# Patient Record
Sex: MALE | Race: BLACK OR AFRICAN AMERICAN | NOT HISPANIC OR LATINO | Employment: OTHER | URBAN - METROPOLITAN AREA
[De-identification: names, ages, dates, MRNs, and addresses within clinical notes are randomized per-mention and may not be internally consistent; named-entity substitution may affect disease eponyms.]

---

## 2018-01-01 ENCOUNTER — TELEPHONE (OUTPATIENT)
Dept: PHARMACY | Facility: CLINIC | Age: 63
End: 2018-01-01

## 2018-01-01 ENCOUNTER — INFUSION (OUTPATIENT)
Dept: INFUSION THERAPY | Facility: HOSPITAL | Age: 63
End: 2018-01-01
Attending: INTERNAL MEDICINE
Payer: MEDICAID

## 2018-01-01 ENCOUNTER — HOSPITAL ENCOUNTER (INPATIENT)
Facility: HOSPITAL | Age: 63
LOS: 2 days | DRG: 871 | End: 2018-10-11
Attending: EMERGENCY MEDICINE | Admitting: INTERNAL MEDICINE
Payer: MEDICAID

## 2018-01-01 ENCOUNTER — TELEPHONE (OUTPATIENT)
Dept: FAMILY MEDICINE | Facility: CLINIC | Age: 63
End: 2018-01-01

## 2018-01-01 ENCOUNTER — HOSPITAL ENCOUNTER (OUTPATIENT)
Facility: HOSPITAL | Age: 63
Discharge: HOME | End: 2018-10-04
Attending: EMERGENCY MEDICINE | Admitting: INTERNAL MEDICINE
Payer: MEDICAID

## 2018-01-01 ENCOUNTER — ANESTHESIA EVENT (OUTPATIENT)
Dept: INTENSIVE CARE | Facility: HOSPITAL | Age: 63
DRG: 871 | End: 2018-01-01
Payer: MEDICAID

## 2018-01-01 ENCOUNTER — HOSPITAL ENCOUNTER (INPATIENT)
Facility: HOSPITAL | Age: 63
LOS: 1 days | Discharge: HOME | DRG: 189 | End: 2018-05-15
Attending: HOSPITALIST | Admitting: HOSPITALIST
Payer: MEDICAID

## 2018-01-01 ENCOUNTER — HOSPITAL ENCOUNTER (EMERGENCY)
Facility: HOSPITAL | Age: 63
Discharge: HOME | End: 2018-05-11
Payer: MEDICAID

## 2018-01-01 ENCOUNTER — OFFICE VISIT (OUTPATIENT)
Dept: CARDIOLOGY | Facility: CLINIC | Age: 63
DRG: 871 | End: 2018-01-01
Payer: MEDICAID

## 2018-01-01 ENCOUNTER — OFFICE VISIT (OUTPATIENT)
Dept: FAMILY MEDICINE | Facility: CLINIC | Age: 63
End: 2018-01-01
Payer: MEDICAID

## 2018-01-01 ENCOUNTER — TELEPHONE (OUTPATIENT)
Dept: HEMATOLOGY/ONCOLOGY | Facility: CLINIC | Age: 63
End: 2018-01-01

## 2018-01-01 ENCOUNTER — INFUSION (OUTPATIENT)
Dept: INFUSION THERAPY | Facility: HOSPITAL | Age: 63
DRG: 871 | End: 2018-01-01
Attending: INTERNAL MEDICINE
Payer: MEDICAID

## 2018-01-01 ENCOUNTER — OFFICE VISIT (OUTPATIENT)
Dept: HEMATOLOGY/ONCOLOGY | Facility: CLINIC | Age: 63
End: 2018-01-01
Payer: MEDICAID

## 2018-01-01 ENCOUNTER — HOSPITAL ENCOUNTER (INPATIENT)
Facility: HOSPITAL | Age: 63
LOS: 7 days | Discharge: HOME HEALTH | DRG: 834 | End: 2018-09-20
Attending: EMERGENCY MEDICINE | Admitting: STUDENT IN AN ORGANIZED HEALTH CARE EDUCATION/TRAINING PROGRAM
Payer: MEDICAID

## 2018-01-01 ENCOUNTER — HOSPITAL ENCOUNTER (EMERGENCY)
Facility: HOSPITAL | Age: 63
Discharge: HOME | End: 2018-09-30
Attending: EMERGENCY MEDICINE
Payer: MEDICAID

## 2018-01-01 ENCOUNTER — ANESTHESIA (OUTPATIENT)
Dept: INTENSIVE CARE | Facility: HOSPITAL | Age: 63
DRG: 871 | End: 2018-01-01
Payer: MEDICAID

## 2018-01-01 VITALS
WEIGHT: 176.56 LBS | DIASTOLIC BLOOD PRESSURE: 84 MMHG | HEART RATE: 74 BPM | SYSTOLIC BLOOD PRESSURE: 138 MMHG | HEIGHT: 71 IN | BODY MASS INDEX: 24.72 KG/M2 | TEMPERATURE: 100 F | RESPIRATION RATE: 20 BRPM | OXYGEN SATURATION: 98 %

## 2018-01-01 VITALS
HEIGHT: 71 IN | DIASTOLIC BLOOD PRESSURE: 63 MMHG | RESPIRATION RATE: 20 BRPM | OXYGEN SATURATION: 84 % | TEMPERATURE: 99 F | HEART RATE: 83 BPM | SYSTOLIC BLOOD PRESSURE: 122 MMHG | BODY MASS INDEX: 27.84 KG/M2 | WEIGHT: 198.88 LBS

## 2018-01-01 VITALS
OXYGEN SATURATION: 94 % | SYSTOLIC BLOOD PRESSURE: 138 MMHG | TEMPERATURE: 98 F | WEIGHT: 182.63 LBS | HEIGHT: 71 IN | BODY MASS INDEX: 25.57 KG/M2 | DIASTOLIC BLOOD PRESSURE: 88 MMHG | HEART RATE: 61 BPM | RESPIRATION RATE: 18 BRPM

## 2018-01-01 VITALS
DIASTOLIC BLOOD PRESSURE: 72 MMHG | HEART RATE: 68 BPM | HEART RATE: 69 BPM | WEIGHT: 181 LBS | DIASTOLIC BLOOD PRESSURE: 52 MMHG | TEMPERATURE: 98 F | HEIGHT: 71 IN | BODY MASS INDEX: 25.34 KG/M2 | SYSTOLIC BLOOD PRESSURE: 106 MMHG | OXYGEN SATURATION: 97 % | RESPIRATION RATE: 18 BRPM | SYSTOLIC BLOOD PRESSURE: 76 MMHG | OXYGEN SATURATION: 95 % | BODY MASS INDEX: 24.47 KG/M2 | WEIGHT: 174.81 LBS | TEMPERATURE: 98 F | HEIGHT: 71 IN

## 2018-01-01 VITALS
WEIGHT: 200 LBS | RESPIRATION RATE: 22 BRPM | SYSTOLIC BLOOD PRESSURE: 102 MMHG | OXYGEN SATURATION: 98 % | TEMPERATURE: 100 F | DIASTOLIC BLOOD PRESSURE: 57 MMHG | HEART RATE: 75 BPM

## 2018-01-01 VITALS
SYSTOLIC BLOOD PRESSURE: 90 MMHG | WEIGHT: 182.75 LBS | TEMPERATURE: 100 F | DIASTOLIC BLOOD PRESSURE: 68 MMHG | HEIGHT: 71 IN | BODY MASS INDEX: 28.98 KG/M2 | WEIGHT: 207 LBS | HEART RATE: 73 BPM | BODY MASS INDEX: 25.58 KG/M2 | SYSTOLIC BLOOD PRESSURE: 102 MMHG | HEART RATE: 75 BPM | HEIGHT: 71 IN | TEMPERATURE: 99 F | RESPIRATION RATE: 18 BRPM | OXYGEN SATURATION: 95 % | OXYGEN SATURATION: 94 % | DIASTOLIC BLOOD PRESSURE: 63 MMHG

## 2018-01-01 VITALS
SYSTOLIC BLOOD PRESSURE: 101 MMHG | HEART RATE: 73 BPM | RESPIRATION RATE: 16 BRPM | DIASTOLIC BLOOD PRESSURE: 58 MMHG | TEMPERATURE: 98 F

## 2018-01-01 VITALS
HEART RATE: 72 BPM | DIASTOLIC BLOOD PRESSURE: 81 MMHG | SYSTOLIC BLOOD PRESSURE: 132 MMHG | HEIGHT: 71 IN | RESPIRATION RATE: 16 BRPM | TEMPERATURE: 98 F | BODY MASS INDEX: 28 KG/M2 | OXYGEN SATURATION: 95 % | WEIGHT: 200 LBS

## 2018-01-01 VITALS
HEART RATE: 70 BPM | TEMPERATURE: 98 F | SYSTOLIC BLOOD PRESSURE: 90 MMHG | DIASTOLIC BLOOD PRESSURE: 56 MMHG | RESPIRATION RATE: 18 BRPM

## 2018-01-01 VITALS
HEIGHT: 71 IN | WEIGHT: 182.75 LBS | RESPIRATION RATE: 18 BRPM | TEMPERATURE: 100 F | BODY MASS INDEX: 25.58 KG/M2 | DIASTOLIC BLOOD PRESSURE: 59 MMHG | SYSTOLIC BLOOD PRESSURE: 103 MMHG | HEART RATE: 68 BPM

## 2018-01-01 VITALS
BODY MASS INDEX: 25.62 KG/M2 | RESPIRATION RATE: 19 BRPM | HEART RATE: 94 BPM | SYSTOLIC BLOOD PRESSURE: 109 MMHG | OXYGEN SATURATION: 95 % | WEIGHT: 183 LBS | TEMPERATURE: 98 F | HEIGHT: 71 IN | DIASTOLIC BLOOD PRESSURE: 69 MMHG

## 2018-01-01 VITALS
DIASTOLIC BLOOD PRESSURE: 86 MMHG | SYSTOLIC BLOOD PRESSURE: 133 MMHG | RESPIRATION RATE: 18 BRPM | HEART RATE: 70 BPM | TEMPERATURE: 99 F

## 2018-01-01 DIAGNOSIS — N52.9 ERECTILE DYSFUNCTION, UNSPECIFIED ERECTILE DYSFUNCTION TYPE: ICD-10-CM

## 2018-01-01 DIAGNOSIS — I10 ESSENTIAL HYPERTENSION: ICD-10-CM

## 2018-01-01 DIAGNOSIS — Z86.73 HISTORY OF CVA (CEREBROVASCULAR ACCIDENT): ICD-10-CM

## 2018-01-01 DIAGNOSIS — D69.6 THROMBOCYTOPENIA: ICD-10-CM

## 2018-01-01 DIAGNOSIS — C95.00 ACUTE LEUKEMIA NOT HAVING ACHIEVED REMISSION: Primary | ICD-10-CM

## 2018-01-01 DIAGNOSIS — D61.818 PANCYTOPENIA: ICD-10-CM

## 2018-01-01 DIAGNOSIS — Z00.00 ANNUAL PHYSICAL EXAM: Primary | ICD-10-CM

## 2018-01-01 DIAGNOSIS — L08.9 LOCAL SKIN INFECTION: ICD-10-CM

## 2018-01-01 DIAGNOSIS — C92.00 ACUTE MYELOID LEUKEMIA NOT HAVING ACHIEVED REMISSION: ICD-10-CM

## 2018-01-01 DIAGNOSIS — C95.00 ACUTE LEUKEMIA: ICD-10-CM

## 2018-01-01 DIAGNOSIS — Z85.46 HISTORY OF PROSTATE CANCER: ICD-10-CM

## 2018-01-01 DIAGNOSIS — K08.9 POOR DENTITION: ICD-10-CM

## 2018-01-01 DIAGNOSIS — C95.00 ACUTE LEUKEMIA NOT HAVING ACHIEVED REMISSION: ICD-10-CM

## 2018-01-01 DIAGNOSIS — J06.9 ACUTE URI: ICD-10-CM

## 2018-01-01 DIAGNOSIS — A41.9 SEPTIC SHOCK: Primary | ICD-10-CM

## 2018-01-01 DIAGNOSIS — J44.9 CHRONIC OBSTRUCTIVE PULMONARY DISEASE, UNSPECIFIED COPD TYPE: ICD-10-CM

## 2018-01-01 DIAGNOSIS — I25.10 CORONARY ARTERY DISEASE INVOLVING NATIVE CORONARY ARTERY OF NATIVE HEART WITHOUT ANGINA PECTORIS: ICD-10-CM

## 2018-01-01 DIAGNOSIS — J44.1 COPD EXACERBATION: ICD-10-CM

## 2018-01-01 DIAGNOSIS — E87.6 HYPOKALEMIA: ICD-10-CM

## 2018-01-01 DIAGNOSIS — E87.5 HYPERKALEMIA: ICD-10-CM

## 2018-01-01 DIAGNOSIS — J96.01 ACUTE HYPOXEMIC RESPIRATORY FAILURE: Primary | ICD-10-CM

## 2018-01-01 DIAGNOSIS — I95.9 HYPOTENSION: ICD-10-CM

## 2018-01-01 DIAGNOSIS — D50.0 ANEMIA DUE TO CHRONIC BLOOD LOSS: ICD-10-CM

## 2018-01-01 DIAGNOSIS — N17.9 AKI (ACUTE KIDNEY INJURY): ICD-10-CM

## 2018-01-01 DIAGNOSIS — D50.0 ANEMIA DUE TO CHRONIC BLOOD LOSS: Primary | ICD-10-CM

## 2018-01-01 DIAGNOSIS — E78.5 HYPERLIPIDEMIA, UNSPECIFIED HYPERLIPIDEMIA TYPE: ICD-10-CM

## 2018-01-01 DIAGNOSIS — I50.9 CONGESTIVE HEART FAILURE, UNSPECIFIED HF CHRONICITY, UNSPECIFIED HEART FAILURE TYPE: ICD-10-CM

## 2018-01-01 DIAGNOSIS — D64.9 NORMOCYTIC ANEMIA: ICD-10-CM

## 2018-01-01 DIAGNOSIS — D72.819 LEUKOPENIA, UNSPECIFIED TYPE: ICD-10-CM

## 2018-01-01 DIAGNOSIS — Z45.2 ENCOUNTER FOR CENTRAL LINE PLACEMENT: ICD-10-CM

## 2018-01-01 DIAGNOSIS — I46.9 CARDIAC ARREST: ICD-10-CM

## 2018-01-01 DIAGNOSIS — C92.00 ACUTE MYELOID LEUKEMIA NOT HAVING ACHIEVED REMISSION: Primary | ICD-10-CM

## 2018-01-01 DIAGNOSIS — J44.1 COPD WITH ACUTE EXACERBATION: ICD-10-CM

## 2018-01-01 DIAGNOSIS — F17.200 TOBACCO DEPENDENCE: ICD-10-CM

## 2018-01-01 DIAGNOSIS — R53.1 WEAKNESS: ICD-10-CM

## 2018-01-01 DIAGNOSIS — R06.02 SOB (SHORTNESS OF BREATH): ICD-10-CM

## 2018-01-01 DIAGNOSIS — R76.8 HEPATITIS B CORE ANTIBODY POSITIVE: ICD-10-CM

## 2018-01-01 DIAGNOSIS — G93.41 ACUTE METABOLIC ENCEPHALOPATHY: ICD-10-CM

## 2018-01-01 DIAGNOSIS — T78.3XXA ANGIOEDEMA, INITIAL ENCOUNTER: Primary | ICD-10-CM

## 2018-01-01 DIAGNOSIS — E04.1 SOLITARY NODULE OF RIGHT LOBE OF THYROID: ICD-10-CM

## 2018-01-01 DIAGNOSIS — J18.9 PNEUMONIA OF RIGHT LOWER LOBE DUE TO INFECTIOUS ORGANISM: Primary | ICD-10-CM

## 2018-01-01 DIAGNOSIS — R50.9 FEVER: ICD-10-CM

## 2018-01-01 DIAGNOSIS — F17.213 CIGARETTE NICOTINE DEPENDENCE WITH WITHDRAWAL: ICD-10-CM

## 2018-01-01 DIAGNOSIS — K04.7 DENTAL INFECTION: Primary | ICD-10-CM

## 2018-01-01 DIAGNOSIS — Z86.73 HISTORY OF CVA (CEREBROVASCULAR ACCIDENT): Primary | ICD-10-CM

## 2018-01-01 DIAGNOSIS — R50.9 ACUTE FEBRILE ILLNESS: Primary | ICD-10-CM

## 2018-01-01 DIAGNOSIS — R32 URINARY INCONTINENCE, UNSPECIFIED TYPE: ICD-10-CM

## 2018-01-01 DIAGNOSIS — N28.9 RENAL INSUFFICIENCY: ICD-10-CM

## 2018-01-01 DIAGNOSIS — D69.6 THROMBOCYTOPENIA: Primary | ICD-10-CM

## 2018-01-01 DIAGNOSIS — R65.21 SEPTIC SHOCK: Primary | ICD-10-CM

## 2018-01-01 DIAGNOSIS — R76.8 HEPATITIS B CORE ANTIBODY POSITIVE: Primary | ICD-10-CM

## 2018-01-01 DIAGNOSIS — R57.9 SHOCK: ICD-10-CM

## 2018-01-01 DIAGNOSIS — D62 ACUTE BLOOD LOSS ANEMIA: ICD-10-CM

## 2018-01-01 DIAGNOSIS — E78.49 OTHER HYPERLIPIDEMIA: ICD-10-CM

## 2018-01-01 DIAGNOSIS — D61.818 OTHER PANCYTOPENIA: ICD-10-CM

## 2018-01-01 LAB
ABO + RH BLD: NORMAL
AFP SERPL-MCNC: 2 NG/ML
ALBUMIN SERPL BCP-MCNC: 1.9 G/DL
ALBUMIN SERPL BCP-MCNC: 2 G/DL
ALBUMIN SERPL BCP-MCNC: 2 G/DL
ALBUMIN SERPL BCP-MCNC: 2.1 G/DL
ALBUMIN SERPL BCP-MCNC: 2.2 G/DL
ALBUMIN SERPL BCP-MCNC: 2.3 G/DL
ALBUMIN SERPL BCP-MCNC: 2.6 G/DL
ALBUMIN SERPL BCP-MCNC: 2.6 G/DL
ALBUMIN SERPL BCP-MCNC: 2.7 G/DL
ALBUMIN SERPL BCP-MCNC: 2.8 G/DL
ALBUMIN SERPL BCP-MCNC: 2.9 G/DL
ALBUMIN SERPL BCP-MCNC: 3.1 G/DL
ALBUMIN SERPL BCP-MCNC: 3.2 G/DL
ALBUMIN SERPL BCP-MCNC: 3.3 G/DL
ALBUMIN SERPL BCP-MCNC: 3.4 G/DL
ALBUMIN SERPL BCP-MCNC: 3.8 G/DL
ALL (BM) DISCLAIMER: NORMAL
ALL (BM) RELEASED BY: NORMAL
ALL (BM) RESULT SUMMARY: NORMAL
ALL (BM) RESULT TABLE: NORMAL
ALLENS TEST: ABNORMAL
ALP SERPL-CCNC: 110 U/L
ALP SERPL-CCNC: 64 U/L
ALP SERPL-CCNC: 64 U/L
ALP SERPL-CCNC: 71 U/L
ALP SERPL-CCNC: 72 U/L
ALP SERPL-CCNC: 73 U/L
ALP SERPL-CCNC: 74 U/L
ALP SERPL-CCNC: 77 U/L
ALP SERPL-CCNC: 79 U/L
ALP SERPL-CCNC: 80 U/L
ALP SERPL-CCNC: 82 U/L
ALP SERPL-CCNC: 89 U/L
ALP SERPL-CCNC: 90 U/L
ALP SERPL-CCNC: 91 U/L
ALP SERPL-CCNC: 91 U/L
ALP SERPL-CCNC: 94 U/L
ALP SERPL-CCNC: 94 U/L
ALP SERPL-CCNC: 95 U/L
ALT SERPL W/O P-5'-P-CCNC: 105 U/L
ALT SERPL W/O P-5'-P-CCNC: 1133 U/L
ALT SERPL W/O P-5'-P-CCNC: 150 U/L
ALT SERPL W/O P-5'-P-CCNC: 2148 U/L
ALT SERPL W/O P-5'-P-CCNC: 240 U/L
ALT SERPL W/O P-5'-P-CCNC: 30 U/L
ALT SERPL W/O P-5'-P-CCNC: 40 U/L
ALT SERPL W/O P-5'-P-CCNC: 52 U/L
ALT SERPL W/O P-5'-P-CCNC: 53 U/L
ALT SERPL W/O P-5'-P-CCNC: 53 U/L
ALT SERPL W/O P-5'-P-CCNC: 58 U/L
ALT SERPL W/O P-5'-P-CCNC: 60 U/L
ALT SERPL W/O P-5'-P-CCNC: 60 U/L
ALT SERPL W/O P-5'-P-CCNC: 64 U/L
ALT SERPL W/O P-5'-P-CCNC: 67 U/L
ALT SERPL W/O P-5'-P-CCNC: 674 U/L
ALT SERPL W/O P-5'-P-CCNC: 70 U/L
ALT SERPL W/O P-5'-P-CCNC: 74 U/L
ALT SERPL W/O P-5'-P-CCNC: 76 U/L
ALT SERPL W/O P-5'-P-CCNC: 86 U/L
ALT SERPL W/O P-5'-P-CCNC: 86 U/L
ALT SERPL W/O P-5'-P-CCNC: 98 U/L
AML FISH ADDITIONAL INFORMATION (BM): NORMAL
AML FISH DISCLAIMER (BM): NORMAL
AML FISH REASON FOR REFERRAL (BM): NORMAL
AML FISH RELEASED BY (BM): NORMAL
AML FISH RESULT (BM): NORMAL
AML FISH RESULT SUMMARY (BM): NORMAL
AML FISH RESULT TABLE (BM): NORMAL
AMPHET+METHAMPHET UR QL: NEGATIVE
ANA SER QL IF: NORMAL
ANION GAP SERPL CALC-SCNC: 10 MMOL/L
ANION GAP SERPL CALC-SCNC: 10 MMOL/L
ANION GAP SERPL CALC-SCNC: 11 MMOL/L
ANION GAP SERPL CALC-SCNC: 11 MMOL/L
ANION GAP SERPL CALC-SCNC: 12 MMOL/L
ANION GAP SERPL CALC-SCNC: 13 MMOL/L
ANION GAP SERPL CALC-SCNC: 13 MMOL/L
ANION GAP SERPL CALC-SCNC: 14 MMOL/L
ANION GAP SERPL CALC-SCNC: 15 MMOL/L
ANION GAP SERPL CALC-SCNC: 16 MMOL/L
ANION GAP SERPL CALC-SCNC: 17 MMOL/L
ANION GAP SERPL CALC-SCNC: 18 MMOL/L
ANION GAP SERPL CALC-SCNC: 5 MMOL/L
ANION GAP SERPL CALC-SCNC: 6 MMOL/L
ANION GAP SERPL CALC-SCNC: 6 MMOL/L
ANION GAP SERPL CALC-SCNC: 7 MMOL/L
ANION GAP SERPL CALC-SCNC: 8 MMOL/L
ANION GAP SERPL CALC-SCNC: 9 MMOL/L
ANION GAP SERPL CALC-SCNC: 9 MMOL/L
ANISOCYTOSIS BLD QL SMEAR: ABNORMAL
ANISOCYTOSIS BLD QL SMEAR: SLIGHT
ANNOTATION COMMENT IMP: NORMAL
APTT BLDCRRT: 24.1 SEC
APTT BLDCRRT: 25 SEC
APTT BLDCRRT: 26.3 SEC
AST SERPL-CCNC: 106 U/L
AST SERPL-CCNC: 1071 U/L
AST SERPL-CCNC: 174 U/L
AST SERPL-CCNC: 1988 U/L
AST SERPL-CCNC: 20 U/L
AST SERPL-CCNC: 276 U/L
AST SERPL-CCNC: 30 U/L
AST SERPL-CCNC: 31 U/L
AST SERPL-CCNC: 31 U/L
AST SERPL-CCNC: 32 U/L
AST SERPL-CCNC: 36 U/L
AST SERPL-CCNC: 3835 U/L
AST SERPL-CCNC: 39 U/L
AST SERPL-CCNC: 41 U/L
AST SERPL-CCNC: 45 U/L
AST SERPL-CCNC: 50 U/L
AST SERPL-CCNC: 51 U/L
AST SERPL-CCNC: 55 U/L
AST SERPL-CCNC: 69 U/L
AST SERPL-CCNC: 70 U/L
AST SERPL-CCNC: 70 U/L
AST SERPL-CCNC: 95 U/L
BACTERIA #/AREA URNS AUTO: ABNORMAL /HPF
BACTERIA #/AREA URNS AUTO: NORMAL /HPF
BACTERIA BLD CULT: NORMAL
BACTERIA SPEC AEROBE CULT: NORMAL
BACTERIA THROAT CULT: NORMAL
BACTERIA THROAT CULT: NORMAL
BACTERIA UR CULT: NO GROWTH
BARBITURATES UR QL SCN>200 NG/ML: NEGATIVE
BASOPHILS # BLD AUTO: 0 K/UL
BASOPHILS # BLD AUTO: 0.01 K/UL
BASOPHILS # BLD AUTO: 0.01 K/UL
BASOPHILS # BLD AUTO: 0.04 K/UL
BASOPHILS # BLD AUTO: ABNORMAL K/UL
BASOPHILS NFR BLD: 0 %
BASOPHILS NFR BLD: 0.4 %
BASOPHILS NFR BLD: 0.5 %
BASOPHILS NFR BLD: 1.1 %
BENZODIAZ UR QL SCN>200 NG/ML: NEGATIVE
BILIRUB SERPL-MCNC: 0.4 MG/DL
BILIRUB SERPL-MCNC: 0.5 MG/DL
BILIRUB SERPL-MCNC: 0.5 MG/DL
BILIRUB SERPL-MCNC: 0.6 MG/DL
BILIRUB SERPL-MCNC: 0.9 MG/DL
BILIRUB SERPL-MCNC: 0.9 MG/DL
BILIRUB SERPL-MCNC: 1 MG/DL
BILIRUB SERPL-MCNC: 1.2 MG/DL
BILIRUB SERPL-MCNC: 1.4 MG/DL
BILIRUB SERPL-MCNC: 2 MG/DL
BILIRUB SERPL-MCNC: 2.5 MG/DL
BILIRUB SERPL-MCNC: 2.7 MG/DL
BILIRUB SERPL-MCNC: 2.8 MG/DL
BILIRUB SERPL-MCNC: 3 MG/DL
BILIRUB SERPL-MCNC: 3 MG/DL
BILIRUB UR QL STRIP: NEGATIVE
BILIRUB UR QL STRIP: NEGATIVE
BLASTS NFR BLD MANUAL: 59 %
BLASTS NFR BLD MANUAL: 61 %
BLASTS NFR BLD MANUAL: 63 %
BLASTS NFR BLD MANUAL: 67 %
BLASTS NFR BLD MANUAL: 72 %
BLASTS NFR BLD MANUAL: 72 %
BLASTS NFR BLD MANUAL: 74 %
BLASTS NFR BLD MANUAL: 76 %
BLASTS NFR BLD MANUAL: 77 %
BLASTS NFR BLD MANUAL: 77 %
BLASTS NFR BLD MANUAL: 78 %
BLASTS NFR BLD MANUAL: 81 %
BLASTS NFR BLD MANUAL: 82 %
BLASTS NFR BLD MANUAL: 84 %
BLASTS NFR BLD MANUAL: 85 %
BLASTS NFR BLD MANUAL: 88 %
BLD GP AB SCN CELLS X3 SERPL QL: NORMAL
BLD PROD TYP BPU: NORMAL
BLOOD UNIT EXPIRATION DATE: NORMAL
BLOOD UNIT TYPE CODE: 5100
BLOOD UNIT TYPE CODE: 6200
BLOOD UNIT TYPE CODE: 6200
BLOOD UNIT TYPE: NORMAL
BODY SITE - BONE MARROW: NORMAL
BONE MARROW IRON STAIN COMMENT: NORMAL
BONE MARROW WRIGHT STAIN COMMENT: NORMAL
BUN SERPL-MCNC: 10 MG/DL
BUN SERPL-MCNC: 10 MG/DL
BUN SERPL-MCNC: 11 MG/DL
BUN SERPL-MCNC: 12 MG/DL
BUN SERPL-MCNC: 13 MG/DL
BUN SERPL-MCNC: 14 MG/DL
BUN SERPL-MCNC: 15 MG/DL
BUN SERPL-MCNC: 17 MG/DL
BUN SERPL-MCNC: 17 MG/DL
BUN SERPL-MCNC: 18 MG/DL
BUN SERPL-MCNC: 22 MG/DL
BUN SERPL-MCNC: 26 MG/DL
BUN SERPL-MCNC: 28 MG/DL
BUN SERPL-MCNC: 29 MG/DL
BUN SERPL-MCNC: 39 MG/DL (ref 6–30)
BUN SERPL-MCNC: 45 MG/DL
BUN SERPL-MCNC: 47 MG/DL
BUN SERPL-MCNC: 53 MG/DL
BUN SERPL-MCNC: 56 MG/DL
BUN SERPL-MCNC: 65 MG/DL
BUN SERPL-MCNC: 70 MG/DL
BUN SERPL-MCNC: 70 MG/DL
BUN SERPL-MCNC: 80 MG/DL
BUN SERPL-MCNC: 84 MG/DL
BUN SERPL-MCNC: 86 MG/DL
BUN SERPL-MCNC: 9 MG/DL
BURR CELLS BLD QL SMEAR: ABNORMAL
BZE UR QL SCN: NEGATIVE
CA-I BLDV-SCNC: 0.68 MMOL/L
CALCIUM SERPL-MCNC: 10.1 MG/DL
CALCIUM SERPL-MCNC: 5.7 MG/DL
CALCIUM SERPL-MCNC: 5.7 MG/DL
CALCIUM SERPL-MCNC: 6 MG/DL
CALCIUM SERPL-MCNC: 6.3 MG/DL
CALCIUM SERPL-MCNC: 6.4 MG/DL
CALCIUM SERPL-MCNC: 6.8 MG/DL
CALCIUM SERPL-MCNC: 6.8 MG/DL
CALCIUM SERPL-MCNC: 6.9 MG/DL
CALCIUM SERPL-MCNC: 7.1 MG/DL
CALCIUM SERPL-MCNC: 8 MG/DL
CALCIUM SERPL-MCNC: 8.2 MG/DL
CALCIUM SERPL-MCNC: 8.3 MG/DL
CALCIUM SERPL-MCNC: 8.6 MG/DL
CALCIUM SERPL-MCNC: 8.7 MG/DL
CALCIUM SERPL-MCNC: 9 MG/DL
CALCIUM SERPL-MCNC: 9.1 MG/DL
CALCIUM SERPL-MCNC: 9.2 MG/DL
CALCIUM SERPL-MCNC: 9.2 MG/DL
CALCIUM SERPL-MCNC: 9.3 MG/DL
CALCIUM SERPL-MCNC: 9.3 MG/DL
CALCIUM SERPL-MCNC: 9.5 MG/DL
CALCIUM SERPL-MCNC: 9.5 MG/DL
CALCIUM SERPL-MCNC: 9.6 MG/DL
CANNABINOIDS UR QL SCN: NEGATIVE
CHLORIDE SERPL-SCNC: 100 MMOL/L
CHLORIDE SERPL-SCNC: 101 MMOL/L
CHLORIDE SERPL-SCNC: 103 MMOL/L
CHLORIDE SERPL-SCNC: 104 MMOL/L
CHLORIDE SERPL-SCNC: 104 MMOL/L
CHLORIDE SERPL-SCNC: 105 MMOL/L
CHLORIDE SERPL-SCNC: 105 MMOL/L
CHLORIDE SERPL-SCNC: 106 MMOL/L
CHLORIDE SERPL-SCNC: 106 MMOL/L
CHLORIDE SERPL-SCNC: 109 MMOL/L
CHLORIDE SERPL-SCNC: 87 MMOL/L
CHLORIDE SERPL-SCNC: 88 MMOL/L
CHLORIDE SERPL-SCNC: 91 MMOL/L
CHLORIDE SERPL-SCNC: 91 MMOL/L (ref 95–110)
CHLORIDE SERPL-SCNC: 92 MMOL/L
CHLORIDE SERPL-SCNC: 93 MMOL/L
CHLORIDE SERPL-SCNC: 93 MMOL/L
CHLORIDE SERPL-SCNC: 94 MMOL/L
CHLORIDE SERPL-SCNC: 95 MMOL/L
CHLORIDE SERPL-SCNC: 95 MMOL/L
CHLORIDE SERPL-SCNC: 97 MMOL/L
CHLORIDE SERPL-SCNC: 98 MMOL/L
CHLORIDE SERPL-SCNC: 99 MMOL/L
CHROM BANDING METHOD: NORMAL
CHROMOSOME ANALYSIS BM ADDITIONAL INFORMATION: NORMAL
CHROMOSOME ANALYSIS BM RELEASED BY: NORMAL
CHROMOSOME ANALYSIS BM RESULT SUMMARY: NORMAL
CK SERPL-CCNC: 226 U/L
CLARITY UR REFRACT.AUTO: ABNORMAL
CLARITY UR REFRACT.AUTO: ABNORMAL
CLINICAL CYTOGENETICIST REVIEW: NORMAL
CLINICAL CYTOGENETICIST REVIEW: NORMAL
CLINICAL DIAGNOSIS - BONE MARROW: NORMAL
CMV IGG SERPL QL IA: REACTIVE
CMV IGM TITR SERPL: <8 U/ML
CO2 SERPL-SCNC: 13 MMOL/L
CO2 SERPL-SCNC: 15 MMOL/L
CO2 SERPL-SCNC: 16 MMOL/L
CO2 SERPL-SCNC: 17 MMOL/L
CO2 SERPL-SCNC: 18 MMOL/L
CO2 SERPL-SCNC: 19 MMOL/L
CO2 SERPL-SCNC: 20 MMOL/L
CO2 SERPL-SCNC: 21 MMOL/L
CO2 SERPL-SCNC: 24 MMOL/L
CO2 SERPL-SCNC: 24 MMOL/L
CO2 SERPL-SCNC: 25 MMOL/L
CO2 SERPL-SCNC: 25 MMOL/L
CO2 SERPL-SCNC: 26 MMOL/L
CO2 SERPL-SCNC: 27 MMOL/L
CO2 SERPL-SCNC: 27 MMOL/L
CO2 SERPL-SCNC: 28 MMOL/L
CO2 SERPL-SCNC: 29 MMOL/L
CO2 SERPL-SCNC: 30 MMOL/L
CO2 SERPL-SCNC: 30 MMOL/L
CO2 SERPL-SCNC: 34 MMOL/L
CO2 SERPL-SCNC: 35 MMOL/L
CODING SYSTEM: NORMAL
COLOR UR AUTO: ABNORMAL
COLOR UR AUTO: YELLOW
CREAT SERPL-MCNC: 0.8 MG/DL
CREAT SERPL-MCNC: 0.9 MG/DL
CREAT SERPL-MCNC: 1 MG/DL
CREAT SERPL-MCNC: 1 MG/DL
CREAT SERPL-MCNC: 1.2 MG/DL
CREAT SERPL-MCNC: 1.5 MG/DL
CREAT SERPL-MCNC: 1.9 MG/DL
CREAT SERPL-MCNC: 2.3 MG/DL
CREAT SERPL-MCNC: 2.4 MG/DL
CREAT SERPL-MCNC: 2.46 MG/DL
CREAT SERPL-MCNC: 2.6 MG/DL
CREAT SERPL-MCNC: 2.6 MG/DL
CREAT SERPL-MCNC: 2.7 MG/DL (ref 0.5–1.4)
CREAT SERPL-MCNC: 2.8 MG/DL
CREAT SERPL-MCNC: 3.4 MG/DL
CREAT SERPL-MCNC: 3.8 MG/DL
CREAT SERPL-MCNC: 3.8 MG/DL
CREAT SERPL-MCNC: 4.4 MG/DL
CREAT SERPL-MCNC: 4.6 MG/DL
CREAT SERPL-MCNC: 4.8 MG/DL
CREAT UR-MCNC: 148 MG/DL
DACRYOCYTES BLD QL SMEAR: ABNORMAL
DACRYOCYTES BLD QL SMEAR: ABNORMAL
DAT IGG-SP REAG RBC-IMP: NORMAL
DELSYS: ABNORMAL
DEPRECATED S PYO AG THROAT QL EIA: NEGATIVE
DEPRECATED S PYO AG THROAT QL EIA: NEGATIVE
DIFFERENTIAL METHOD: ABNORMAL
DISPENSE STATUS: NORMAL
DNA/RNA EXTRACT AND HOLD RESULT: NORMAL
DNA/RNA EXTRACTION: NORMAL
EBV EA AB TITR SER: 101 U/ML
EBV NA IGG SER QL: 483 U/ML
EBV VCA IGG SER QL: >750 U/ML
EBV VCA IGM SER-ACNC: <10 U/ML
EOSINOPHIL # BLD AUTO: 0 K/UL
EOSINOPHIL # BLD AUTO: 0.1 K/UL
EOSINOPHIL # BLD AUTO: ABNORMAL K/UL
EOSINOPHIL NFR BLD: 0 %
EOSINOPHIL NFR BLD: 1.7 %
ERYTHROCYTE [DISTWIDTH] IN BLOOD BY AUTOMATED COUNT: 16 %
ERYTHROCYTE [DISTWIDTH] IN BLOOD BY AUTOMATED COUNT: 16.5 %
ERYTHROCYTE [DISTWIDTH] IN BLOOD BY AUTOMATED COUNT: 17.1 %
ERYTHROCYTE [DISTWIDTH] IN BLOOD BY AUTOMATED COUNT: 17.2 %
ERYTHROCYTE [DISTWIDTH] IN BLOOD BY AUTOMATED COUNT: 17.2 %
ERYTHROCYTE [DISTWIDTH] IN BLOOD BY AUTOMATED COUNT: 18.2 %
ERYTHROCYTE [DISTWIDTH] IN BLOOD BY AUTOMATED COUNT: 18.3 %
ERYTHROCYTE [DISTWIDTH] IN BLOOD BY AUTOMATED COUNT: 18.3 %
ERYTHROCYTE [DISTWIDTH] IN BLOOD BY AUTOMATED COUNT: 18.4 %
ERYTHROCYTE [DISTWIDTH] IN BLOOD BY AUTOMATED COUNT: 18.5 %
ERYTHROCYTE [DISTWIDTH] IN BLOOD BY AUTOMATED COUNT: 18.6 %
ERYTHROCYTE [DISTWIDTH] IN BLOOD BY AUTOMATED COUNT: 19 %
ERYTHROCYTE [DISTWIDTH] IN BLOOD BY AUTOMATED COUNT: 19.1 %
ERYTHROCYTE [DISTWIDTH] IN BLOOD BY AUTOMATED COUNT: 19.2 %
ERYTHROCYTE [DISTWIDTH] IN BLOOD BY AUTOMATED COUNT: 19.3 %
ERYTHROCYTE [DISTWIDTH] IN BLOOD BY AUTOMATED COUNT: 19.4 %
ERYTHROCYTE [DISTWIDTH] IN BLOOD BY AUTOMATED COUNT: 19.6 %
ERYTHROCYTE [SEDIMENTATION RATE] IN BLOOD BY WESTERGREN METHOD: 14 MM/H
ERYTHROCYTE [SEDIMENTATION RATE] IN BLOOD BY WESTERGREN METHOD: 20 MM/H
ERYTHROCYTE [SEDIMENTATION RATE] IN BLOOD BY WESTERGREN METHOD: 27 MM/H
EST. GFR  (AFRICAN AMERICAN): 14.1 ML/MIN/1.73 M^2
EST. GFR  (AFRICAN AMERICAN): 14.9 ML/MIN/1.73 M^2
EST. GFR  (AFRICAN AMERICAN): 15.7 ML/MIN/1.73 M^2
EST. GFR  (AFRICAN AMERICAN): 18.7 ML/MIN/1.73 M^2
EST. GFR  (AFRICAN AMERICAN): 18.7 ML/MIN/1.73 M^2
EST. GFR  (AFRICAN AMERICAN): 21.4 ML/MIN/1.73 M^2
EST. GFR  (AFRICAN AMERICAN): 27.1 ML/MIN/1.73 M^2
EST. GFR  (AFRICAN AMERICAN): 29.7 ML/MIN/1.73 M^2
EST. GFR  (AFRICAN AMERICAN): 29.7 ML/MIN/1.73 M^2
EST. GFR  (AFRICAN AMERICAN): 31.7 ML/MIN/1.73 M^2
EST. GFR  (AFRICAN AMERICAN): 32.7 ML/MIN/1.73 M^2
EST. GFR  (AFRICAN AMERICAN): 34.4 ML/MIN/1.73 M^2
EST. GFR  (AFRICAN AMERICAN): 43.3 ML/MIN/1.73 M^2
EST. GFR  (AFRICAN AMERICAN): 57.7 ML/MIN/1.73 M^2
EST. GFR  (AFRICAN AMERICAN): >60 ML/MIN/1.73 M^2
EST. GFR  (NON AFRICAN AMERICAN): 12.2 ML/MIN/1.73 M^2
EST. GFR  (NON AFRICAN AMERICAN): 12.9 ML/MIN/1.73 M^2
EST. GFR  (NON AFRICAN AMERICAN): 13.6 ML/MIN/1.73 M^2
EST. GFR  (NON AFRICAN AMERICAN): 16.2 ML/MIN/1.73 M^2
EST. GFR  (NON AFRICAN AMERICAN): 16.2 ML/MIN/1.73 M^2
EST. GFR  (NON AFRICAN AMERICAN): 18.5 ML/MIN/1.73 M^2
EST. GFR  (NON AFRICAN AMERICAN): 23.5 ML/MIN/1.73 M^2
EST. GFR  (NON AFRICAN AMERICAN): 25.7 ML/MIN/1.73 M^2
EST. GFR  (NON AFRICAN AMERICAN): 25.7 ML/MIN/1.73 M^2
EST. GFR  (NON AFRICAN AMERICAN): 27.4 ML/MIN/1.73 M^2
EST. GFR  (NON AFRICAN AMERICAN): 28.3 ML/MIN/1.73 M^2
EST. GFR  (NON AFRICAN AMERICAN): 29.8 ML/MIN/1.73 M^2
EST. GFR  (NON AFRICAN AMERICAN): 37.5 ML/MIN/1.73 M^2
EST. GFR  (NON AFRICAN AMERICAN): 49.9 ML/MIN/1.73 M^2
EST. GFR  (NON AFRICAN AMERICAN): >60 ML/MIN/1.73 M^2
ESTIMATED AVG GLUCOSE: 117 MG/DL
ESTIMATED PA SYSTOLIC PRESSURE: 18.37
ESTIMATED PA SYSTOLIC PRESSURE: 44
EXHR SPECIMEN TYPE: NORMAL
FAMLB SPECIMEN: NORMAL
FBALB INTERPRETATION: NORMAL
FBALB REASON FOR REFERRAL: NORMAL
FBALB RESULT: NORMAL
FBALB SPECIMEN: NORMAL
FERRITIN SERPL-MCNC: 2238 NG/ML
FIBRINOGEN PPP-MCNC: 377 MG/DL
FIBRINOGEN PPP-MCNC: 481 MG/DL
FIBRINOGEN PPP-MCNC: 483 MG/DL
FIBRINOGEN PPP-MCNC: 487 MG/DL
FIBRINOGEN PPP-MCNC: 500 MG/DL
FIBRINOGEN PPP-MCNC: 517 MG/DL
FIBRINOGEN PPP-MCNC: 559 MG/DL
FIBRINOGEN PPP-MCNC: 582 MG/DL
FIBRINOGEN PPP-MCNC: 591 MG/DL
FIBRINOGEN PPP-MCNC: 592 MG/DL
FIO2: 30
FIO2: 30
FIO2: 40
FIO2: 40
FLOW CYTOMETRY ANTIBODIES ANALYZED - BLOOD: NORMAL
FLOW CYTOMETRY ANTIBODIES ANALYZED - BONE MARROW: NORMAL
FLOW CYTOMETRY COMMENT - BLOOD: NORMAL
FLOW CYTOMETRY COMMENT - BONE MARROW: NORMAL
FLOW CYTOMETRY INTERPRETATION - BLOOD: NORMAL
FLOW CYTOMETRY INTERPRETATION - BONE MARROW: NORMAL
FLOW: 3
FLT3 RESULT: NORMAL
FLUAV AG SPEC QL IA: NEGATIVE
FLUBV AG SPEC QL IA: NEGATIVE
FOLATE SERPL-MCNC: 5.7 NG/ML
G6PD RBC-CCNT: 16.7 U/G HGB (ref 7–20.5)
GIANT PLATELETS BLD QL SMEAR: PRESENT
GIANT PLATELETS BLD QL SMEAR: PRESENT
GLUCOSE SERPL-MCNC: 100 MG/DL
GLUCOSE SERPL-MCNC: 100 MG/DL
GLUCOSE SERPL-MCNC: 102 MG/DL
GLUCOSE SERPL-MCNC: 103 MG/DL
GLUCOSE SERPL-MCNC: 103 MG/DL
GLUCOSE SERPL-MCNC: 107 MG/DL
GLUCOSE SERPL-MCNC: 109 MG/DL (ref 70–110)
GLUCOSE SERPL-MCNC: 110 MG/DL
GLUCOSE SERPL-MCNC: 115 MG/DL
GLUCOSE SERPL-MCNC: 122 MG/DL
GLUCOSE SERPL-MCNC: 124 MG/DL
GLUCOSE SERPL-MCNC: 128 MG/DL
GLUCOSE SERPL-MCNC: 144 MG/DL
GLUCOSE SERPL-MCNC: 145 MG/DL
GLUCOSE SERPL-MCNC: 151 MG/DL
GLUCOSE SERPL-MCNC: 157 MG/DL
GLUCOSE SERPL-MCNC: 159 MG/DL
GLUCOSE SERPL-MCNC: 163 MG/DL
GLUCOSE SERPL-MCNC: 181 MG/DL
GLUCOSE SERPL-MCNC: 185 MG/DL
GLUCOSE SERPL-MCNC: 191 MG/DL
GLUCOSE SERPL-MCNC: 194 MG/DL
GLUCOSE SERPL-MCNC: 197 MG/DL
GLUCOSE SERPL-MCNC: 202 MG/DL
GLUCOSE SERPL-MCNC: 202 MG/DL
GLUCOSE SERPL-MCNC: 223 MG/DL
GLUCOSE SERPL-MCNC: 235 MG/DL
GLUCOSE SERPL-MCNC: 93 MG/DL
GLUCOSE SERPL-MCNC: 96 MG/DL
GLUCOSE SERPL-MCNC: 97 MG/DL
GLUCOSE SERPL-MCNC: 98 MG/DL
GLUCOSE UR QL STRIP: NEGATIVE
GLUCOSE UR QL STRIP: NEGATIVE
GRAM STN SPEC: NORMAL
HAPTOGLOB SERPL-MCNC: 306 MG/DL
HAPTOGLOB SERPL-MCNC: 373 MG/DL
HAV IGM SERPL QL IA: NEGATIVE
HBA1C MFR BLD HPLC: 5.7 %
HBV CORE AB SERPL QL IA: POSITIVE
HBV CORE IGM SERPL QL IA: NEGATIVE
HBV E AB SER QL: NORMAL
HBV E AG SERPL QL IA: NORMAL
HBV SURFACE AG SERPL QL IA: NEGATIVE
HCO3 UR-SCNC: 16.3 MMOL/L (ref 24–28)
HCO3 UR-SCNC: 16.5 MMOL/L (ref 24–28)
HCO3 UR-SCNC: 18.1 MMOL/L (ref 24–28)
HCO3 UR-SCNC: 18.6 MMOL/L (ref 24–28)
HCO3 UR-SCNC: 19.9 MMOL/L (ref 24–28)
HCO3 UR-SCNC: 23.8 MMOL/L (ref 24–28)
HCO3 UR-SCNC: 25.8 MMOL/L (ref 24–28)
HCT VFR BLD AUTO: 17.6 %
HCT VFR BLD AUTO: 17.7 %
HCT VFR BLD AUTO: 18.3 %
HCT VFR BLD AUTO: 19.1 %
HCT VFR BLD AUTO: 19.6 %
HCT VFR BLD AUTO: 21.2 %
HCT VFR BLD AUTO: 21.6 %
HCT VFR BLD AUTO: 21.7 %
HCT VFR BLD AUTO: 21.7 %
HCT VFR BLD AUTO: 22.1 %
HCT VFR BLD AUTO: 22.5 %
HCT VFR BLD AUTO: 22.6 %
HCT VFR BLD AUTO: 22.6 %
HCT VFR BLD AUTO: 23.1 %
HCT VFR BLD AUTO: 23.3 %
HCT VFR BLD AUTO: 23.4 %
HCT VFR BLD AUTO: 24.1 %
HCT VFR BLD AUTO: 24.3 %
HCT VFR BLD AUTO: 24.6 %
HCT VFR BLD AUTO: 25.8 %
HCT VFR BLD AUTO: 25.9 %
HCT VFR BLD AUTO: 26 %
HCT VFR BLD AUTO: 33.6 %
HCT VFR BLD AUTO: 33.6 %
HCT VFR BLD AUTO: 34.1 %
HCT VFR BLD AUTO: 34.9 %
HCT VFR BLD AUTO: 35 %
HCT VFR BLD CALC: 19 %PCV (ref 36–54)
HCV AB SERPL QL IA: NEGATIVE
HEPATITIS B VIRAL DNA - QUANTITATIVE: <10 IU/ML
HEPATITIS B VIRAL DNA - QUANTITATIVE: <10 IU/ML
HEPATITIS B VIRUS DNA: NOT DETECTED
HEPATITIS B VIRUS DNA: NOT DETECTED
HGB BLD-MCNC: 10.8 G/DL
HGB BLD-MCNC: 11 G/DL
HGB BLD-MCNC: 11 G/DL
HGB BLD-MCNC: 11.3 G/DL
HGB BLD-MCNC: 11.3 G/DL
HGB BLD-MCNC: 5.8 G/DL
HGB BLD-MCNC: 6 G/DL
HGB BLD-MCNC: 6.1 G/DL
HGB BLD-MCNC: 6.2 G/DL
HGB BLD-MCNC: 6.5 G/DL
HGB BLD-MCNC: 6.8 G/DL
HGB BLD-MCNC: 6.8 G/DL
HGB BLD-MCNC: 7.1 G/DL
HGB BLD-MCNC: 7.2 G/DL
HGB BLD-MCNC: 7.3 G/DL
HGB BLD-MCNC: 7.3 G/DL
HGB BLD-MCNC: 7.4 G/DL
HGB BLD-MCNC: 7.5 G/DL
HGB BLD-MCNC: 7.5 G/DL
HGB BLD-MCNC: 7.6 G/DL
HGB BLD-MCNC: 7.9 G/DL
HGB BLD-MCNC: 8 G/DL
HGB BLD-MCNC: 8.4 G/DL
HGB BLD-MCNC: 8.5 G/DL
HGB BLD-MCNC: 8.6 G/DL
HGB UR QL STRIP: NEGATIVE
HGB UR QL STRIP: NEGATIVE
HIV 1+2 AB+HIV1 P24 AG SERPL QL IA: NEGATIVE
HIV 1+2 AB+HIV1 P24 AG SERPL QL IA: NEGATIVE
HLA DRB4 1: NORMAL
HLA HI RES SEQUNCE BASED TYPING TEST DATE: NORMAL
HLA SSO DNA TYPING CLASS I & II INTERPRETATION: NORMAL
HLA-A 1 SERO. EQUIV: 33
HLA-A 1: NORMAL
HLA-A 2 SERO. EQUIV: NORMAL
HLA-A 2: NORMAL
HLA-A1 HI RES: NORMAL
HLA-A2 HI RES: NORMAL
HLA-B 1 SERO. EQUIV: 53
HLA-B 1: NORMAL
HLA-B 2 SERO. EQUIV: NORMAL
HLA-B 2: NORMAL
HLA-B1 HI RES: NORMAL
HLA-B2 HI RES: NORMAL
HLA-BW 1 SERO. EQUIV: 4
HLA-BW 2 SERO. EQUIV: NORMAL
HLA-C 1: NORMAL
HLA-C 2: NORMAL
HLA-C1 HI RES: NORMAL
HLA-C2 HI RES: NORMAL
HLA-CW 1 SERO. EQUIV: 4
HLA-CW 2 SERO. EQUIV: 6
HLA-DQ 1 SERO. EQUIV: 7
HLA-DQ 2 SERO. EQUIV: NORMAL
HLA-DQB1 1 HI RES: NORMAL
HLA-DQB1 1: NORMAL
HLA-DQB1 2 HI RES: NORMAL
HLA-DQB1 2: NORMAL
HLA-DRB1 1 HI RES: NORMAL
HLA-DRB1 1 SERO. EQUIV: 8
HLA-DRB1 1: NORMAL
HLA-DRB1 2 HI RES: NORMAL
HLA-DRB1 2 SERO. EQUIV: 11
HLA-DRB1 2: NORMAL
HLA-DRB3 1 HI RES: NORMAL
HLA-DRB3 1: NORMAL
HLA-DRB3 2 HI RES: NORMAL
HLA-DRB3 2: NORMAL
HLA-DRB345 1 SERO. EQUIV: 52
HLA-DRB345 2 SERO. EQUIV: NORMAL
HLA-DRB4 1 HI RES: NORMAL
HLA-DRB4 2 HI RES: NORMAL
HLA-DRB4 2: NORMAL
HLA-DRB5 1 HI RES: NORMAL
HLA-DRB5 1: NORMAL
HLA-DRB5 2 HI RES: NORMAL
HLA-DRB5 2: NORMAL
HYALINE CASTS UR QL AUTO: 0 /LPF
HYALINE CASTS UR QL AUTO: 2 /LPF
HYPOCHROMIA BLD QL SMEAR: ABNORMAL
IMM GRANULOCYTES # BLD AUTO: 0 K/UL
IMM GRANULOCYTES # BLD AUTO: 0.01 K/UL
IMM GRANULOCYTES # BLD AUTO: 0.02 K/UL
IMM GRANULOCYTES # BLD AUTO: 0.07 K/UL
IMM GRANULOCYTES # BLD AUTO: ABNORMAL K/UL
IMM GRANULOCYTES NFR BLD AUTO: 0 %
IMM GRANULOCYTES NFR BLD AUTO: 0.2 %
IMM GRANULOCYTES NFR BLD AUTO: 1.1 %
IMM GRANULOCYTES NFR BLD AUTO: 3.1 %
IMM GRANULOCYTES NFR BLD AUTO: ABNORMAL %
INR PPP: 1.1
INR PPP: 1.3
INR PPP: 2.3
INR PPP: 3.4
KARYOTYP MAR: NORMAL
KETONES UR QL STRIP: NEGATIVE
KETONES UR QL STRIP: NEGATIVE
LACTATE SERPL-SCNC: 1.3 MMOL/L
LACTATE SERPL-SCNC: 1.8 MMOL/L
LACTATE SERPL-SCNC: 2.2 MMOL/L
LACTATE SERPL-SCNC: 3.3 MMOL/L
LACTATE SERPL-SCNC: 3.5 MMOL/L
LACTATE SERPL-SCNC: 4.4 MMOL/L
LACTATE SERPL-SCNC: 4.5 MMOL/L
LACTATE SERPL-SCNC: 5.3 MMOL/L
LACTATE SERPL-SCNC: 7 MMOL/L
LACTATE SERPL-SCNC: 8 MMOL/L
LDH SERPL L TO P-CCNC: 551 U/L
LDH SERPL L TO P-CCNC: 618 U/L
LDH SERPL L TO P-CCNC: 665 U/L
LDH SERPL L TO P-CCNC: 678 U/L
LDH SERPL L TO P-CCNC: 769 U/L
LDH SERPL L TO P-CCNC: 810 U/L
LDH SERPL L TO P-CCNC: 823 U/L
LDH SERPL L TO P-CCNC: 919 U/L
LDH SERPL L TO P-CCNC: 932 U/L
LEUKOCYTE ESTERASE UR QL STRIP: NEGATIVE
LEUKOCYTE ESTERASE UR QL STRIP: NEGATIVE
LOG HBV IU/ML: <1 LOG (10) IU/ML
LOG HBV IU/ML: <1 LOG (10) IU/ML
LYMPHOCYTES # BLD AUTO: 1.1 K/UL
LYMPHOCYTES # BLD AUTO: 1.4 K/UL
LYMPHOCYTES # BLD AUTO: 1.5 K/UL
LYMPHOCYTES # BLD AUTO: 1.7 K/UL
LYMPHOCYTES # BLD AUTO: 2.3 K/UL
LYMPHOCYTES # BLD AUTO: 4 K/UL
LYMPHOCYTES # BLD AUTO: 5.6 K/UL
LYMPHOCYTES # BLD AUTO: ABNORMAL K/UL
LYMPHOCYTES NFR BLD: 10 %
LYMPHOCYTES NFR BLD: 14 %
LYMPHOCYTES NFR BLD: 17 %
LYMPHOCYTES NFR BLD: 17 %
LYMPHOCYTES NFR BLD: 19 %
LYMPHOCYTES NFR BLD: 23 %
LYMPHOCYTES NFR BLD: 24.9 %
LYMPHOCYTES NFR BLD: 26.3 %
LYMPHOCYTES NFR BLD: 28 %
LYMPHOCYTES NFR BLD: 28 %
LYMPHOCYTES NFR BLD: 31 %
LYMPHOCYTES NFR BLD: 31 %
LYMPHOCYTES NFR BLD: 31.2 %
LYMPHOCYTES NFR BLD: 32 %
LYMPHOCYTES NFR BLD: 34 %
LYMPHOCYTES NFR BLD: 38 %
LYMPHOCYTES NFR BLD: 49.9 %
LYMPHOCYTES NFR BLD: 64 %
LYMPHOCYTES NFR BLD: 7 %
LYMPHOCYTES NFR BLD: 75.1 %
LYMPHOCYTES NFR BLD: 8 %
LYMPHOCYTES NFR BLD: 8 %
LYMPHOCYTES NFR BLD: 8.5 %
LYMPHOCYTES NFR BLD: 83.7 %
LYMPHOCYTES NFR BLD: 9 %
M PNEUMO IGM SER IA-ACNC: 176 U/ML
MAGNESIUM SERPL-MCNC: 1.4 MG/DL
MAGNESIUM SERPL-MCNC: 1.6 MG/DL
MAGNESIUM SERPL-MCNC: 1.6 MG/DL
MAGNESIUM SERPL-MCNC: 1.8 MG/DL
MAGNESIUM SERPL-MCNC: 1.8 MG/DL
MAGNESIUM SERPL-MCNC: 1.9 MG/DL
MAGNESIUM SERPL-MCNC: 2 MG/DL
MAGNESIUM SERPL-MCNC: 2 MG/DL
MAGNESIUM SERPL-MCNC: 2.1 MG/DL
MAGNESIUM SERPL-MCNC: 2.2 MG/DL
MAGNESIUM SERPL-MCNC: 2.3 MG/DL
MAGNESIUM SERPL-MCNC: 2.3 MG/DL
MAGNESIUM SERPL-MCNC: 2.4 MG/DL
MCH RBC QN AUTO: 29.4 PG
MCH RBC QN AUTO: 29.5 PG
MCH RBC QN AUTO: 29.6 PG
MCH RBC QN AUTO: 29.7 PG
MCH RBC QN AUTO: 29.7 PG
MCH RBC QN AUTO: 30.6 PG
MCH RBC QN AUTO: 30.9 PG
MCH RBC QN AUTO: 30.9 PG
MCH RBC QN AUTO: 31.1 PG
MCH RBC QN AUTO: 31.1 PG
MCH RBC QN AUTO: 31.2 PG
MCH RBC QN AUTO: 31.3 PG
MCH RBC QN AUTO: 31.3 PG
MCH RBC QN AUTO: 31.4 PG
MCH RBC QN AUTO: 31.4 PG
MCH RBC QN AUTO: 31.7 PG
MCH RBC QN AUTO: 31.8 PG
MCH RBC QN AUTO: 31.8 PG
MCH RBC QN AUTO: 32 PG
MCH RBC QN AUTO: 32 PG
MCH RBC QN AUTO: 32.1 PG
MCH RBC QN AUTO: 32.3 PG
MCH RBC QN AUTO: 32.4 PG
MCH RBC QN AUTO: 32.4 PG
MCH RBC QN AUTO: 32.5 PG
MCH RBC QN AUTO: 32.7 PG
MCH RBC QN AUTO: 33 PG
MCHC RBC AUTO-ENTMCNC: 31.5 G/DL
MCHC RBC AUTO-ENTMCNC: 31.8 G/DL
MCHC RBC AUTO-ENTMCNC: 32.1 G/DL
MCHC RBC AUTO-ENTMCNC: 32.3 G/DL
MCHC RBC AUTO-ENTMCNC: 32.4 G/DL
MCHC RBC AUTO-ENTMCNC: 32.5 G/DL
MCHC RBC AUTO-ENTMCNC: 32.5 G/DL
MCHC RBC AUTO-ENTMCNC: 32.7 G/DL
MCHC RBC AUTO-ENTMCNC: 32.8 G/DL
MCHC RBC AUTO-ENTMCNC: 32.9 G/DL
MCHC RBC AUTO-ENTMCNC: 33 G/DL
MCHC RBC AUTO-ENTMCNC: 33.2 G/DL
MCHC RBC AUTO-ENTMCNC: 33.3 G/DL
MCHC RBC AUTO-ENTMCNC: 33.6 G/DL
MCHC RBC AUTO-ENTMCNC: 34.5 G/DL
MCHC RBC AUTO-ENTMCNC: 35 G/DL
MCV RBC AUTO: 100 FL
MCV RBC AUTO: 90 FL
MCV RBC AUTO: 90 FL
MCV RBC AUTO: 91 FL
MCV RBC AUTO: 91 FL
MCV RBC AUTO: 92 FL
MCV RBC AUTO: 94 FL
MCV RBC AUTO: 95 FL
MCV RBC AUTO: 96 FL
MCV RBC AUTO: 96 FL
MCV RBC AUTO: 97 FL
MCV RBC AUTO: 97 FL
MCV RBC AUTO: 98 FL
MCV RBC AUTO: 99 FL
METHADONE UR QL SCN>300 NG/ML: NEGATIVE
MICROSCOPIC COMMENT: ABNORMAL
MICROSCOPIC COMMENT: NORMAL
MIN VOL: 13.6
MITRAL VALVE REGURGITATION: NORMAL
MODE: ABNORMAL
MONOCYTES # BLD AUTO: 0.1 K/UL
MONOCYTES # BLD AUTO: 0.1 K/UL
MONOCYTES # BLD AUTO: 15.4 K/UL
MONOCYTES # BLD AUTO: 2.1 K/UL
MONOCYTES # BLD AUTO: 3 K/UL
MONOCYTES # BLD AUTO: 4 K/UL
MONOCYTES # BLD AUTO: 6.4 K/UL
MONOCYTES # BLD AUTO: ABNORMAL K/UL
MONOCYTES NFR BLD: 0 %
MONOCYTES NFR BLD: 1 %
MONOCYTES NFR BLD: 10.5 %
MONOCYTES NFR BLD: 12 %
MONOCYTES NFR BLD: 13 %
MONOCYTES NFR BLD: 17 %
MONOCYTES NFR BLD: 19 %
MONOCYTES NFR BLD: 19 %
MONOCYTES NFR BLD: 2 %
MONOCYTES NFR BLD: 2 %
MONOCYTES NFR BLD: 4 %
MONOCYTES NFR BLD: 4.3 %
MONOCYTES NFR BLD: 49.3 %
MONOCYTES NFR BLD: 5 %
MONOCYTES NFR BLD: 5 %
MONOCYTES NFR BLD: 5.8 %
MONOCYTES NFR BLD: 57.6 %
MONOCYTES NFR BLD: 66.6 %
MONOCYTES NFR BLD: 68.5 %
MONOCYTES NFR BLD: 7 %
MONOCYTES NFR BLD: 7 %
MONOCYTES NFR BLD: 72.6 %
MYELOCYTES NFR BLD MANUAL: 0.5 %
MYELOCYTES NFR BLD MANUAL: 0.5 %
MYELOCYTES NFR BLD MANUAL: 1 %
NEUTROPHILS # BLD AUTO: 0.1 K/UL
NEUTROPHILS # BLD AUTO: 0.2 K/UL
NEUTROPHILS # BLD AUTO: 0.4 K/UL
NEUTROPHILS # BLD AUTO: 1.5 K/UL
NEUTROPHILS NFR BLD: 0 %
NEUTROPHILS NFR BLD: 0.5 %
NEUTROPHILS NFR BLD: 0.6 %
NEUTROPHILS NFR BLD: 0.9 %
NEUTROPHILS NFR BLD: 1 %
NEUTROPHILS NFR BLD: 10.4 %
NEUTROPHILS NFR BLD: 15.6 %
NEUTROPHILS NFR BLD: 2 %
NEUTROPHILS NFR BLD: 2 %
NEUTROPHILS NFR BLD: 39 %
NEUTROPHILS NFR BLD: 4 %
NEUTROPHILS NFR BLD: 4 %
NEUTROPHILS NFR BLD: 49 %
NEUTROPHILS NFR BLD: 6.6 %
NEUTROPHILS NFR BLD: 8.6 %
NEUTS BAND NFR BLD MANUAL: 1 %
NEUTS BAND NFR BLD MANUAL: 5 %
NGS CLINCIAL TRIALS: NORMAL
NGS INDICATION OF TEST: NORMAL
NGS INTERPRETATION: NORMAL
NGS ONCOHEME PANEL GENE LIST: NORMAL
NGS PATHOGENIC MUTATIONS DETECTED: NORMAL
NGS REVIEWED BY:: NORMAL
NGS VARIANTS OF UNKNOWN SIGNIFICANCE: NORMAL
NITRITE UR QL STRIP: NEGATIVE
NITRITE UR QL STRIP: NEGATIVE
NRBC BLD-RTO: 0 /100 WBC
NRBC BLD-RTO: 1 /100 WBC
NRBC BLD-RTO: 2 /100 WBC
NRBC BLD-RTO: 3 /100 WBC
NUM UNITS TRANS PACKED RBC: NORMAL
NUM UNITS TRANS WBC-POOR PLATPHERESIS: NORMAL
OPIATES UR QL SCN: NEGATIVE
OVALOCYTES BLD QL SMEAR: ABNORMAL
PARVOVIRUS B19 ABS IGG & IGM: NORMAL
PARVOVIRUS B19 IGG ANTIBODY: NEGATIVE
PARVOVIRUS B19 IGM ANTIBODY: NEGATIVE
PATH REPORT.FINAL DX SPEC: NORMAL
PATH REV BLD -IMP: NORMAL
PATHOLOGIST INTERPRETATION TRANSF REACTION: NORMAL
PCO2 BLDA: 24.4 MMHG (ref 35–45)
PCO2 BLDA: 33.7 MMHG (ref 35–45)
PCO2 BLDA: 34.4 MMHG (ref 35–45)
PCO2 BLDA: 35.6 MMHG (ref 35–45)
PCO2 BLDA: 35.9 MMHG (ref 35–45)
PCO2 BLDA: 38.3 MMHG (ref 35–45)
PCO2 BLDA: 42.2 MMHG (ref 35–45)
PCP UR QL SCN>25 NG/ML: NEGATIVE
PEEP: 5
PEEP: 6
PEEP: 8
PH SMN: 7.24 [PH] (ref 7.35–7.45)
PH SMN: 7.27 [PH] (ref 7.35–7.45)
PH SMN: 7.32 [PH] (ref 7.35–7.45)
PH SMN: 7.34 [PH] (ref 7.35–7.45)
PH SMN: 7.43 [PH] (ref 7.35–7.45)
PH SMN: 7.44 [PH] (ref 7.35–7.45)
PH SMN: 7.49 [PH] (ref 7.35–7.45)
PH UR STRIP: 5 [PH] (ref 5–8)
PH UR STRIP: 5 [PH] (ref 5–8)
PHOSPHATE SERPL-MCNC: 3.1 MG/DL
PHOSPHATE SERPL-MCNC: 3.2 MG/DL
PHOSPHATE SERPL-MCNC: 3.2 MG/DL
PHOSPHATE SERPL-MCNC: 3.6 MG/DL
PHOSPHATE SERPL-MCNC: 3.6 MG/DL
PHOSPHATE SERPL-MCNC: 3.7 MG/DL
PHOSPHATE SERPL-MCNC: 3.8 MG/DL
PHOSPHATE SERPL-MCNC: 3.9 MG/DL
PHOSPHATE SERPL-MCNC: 4 MG/DL
PHOSPHATE SERPL-MCNC: 4.2 MG/DL
PHOSPHATE SERPL-MCNC: 4.3 MG/DL
PHOSPHATE SERPL-MCNC: 4.5 MG/DL
PHOSPHATE SERPL-MCNC: 7.6 MG/DL
PHOSPHATE SERPL-MCNC: 9.2 MG/DL
PIP: 17
PLATELET # BLD AUTO: 110 K/UL
PLATELET # BLD AUTO: 117 K/UL
PLATELET # BLD AUTO: 131 K/UL
PLATELET # BLD AUTO: 153 K/UL
PLATELET # BLD AUTO: 166 K/UL
PLATELET # BLD AUTO: 18 K/UL
PLATELET # BLD AUTO: 18 K/UL
PLATELET # BLD AUTO: 23 K/UL
PLATELET # BLD AUTO: 23 K/UL
PLATELET # BLD AUTO: 25 K/UL
PLATELET # BLD AUTO: 26 K/UL
PLATELET # BLD AUTO: 26 K/UL
PLATELET # BLD AUTO: 27 K/UL
PLATELET # BLD AUTO: 27 K/UL
PLATELET # BLD AUTO: 28 K/UL
PLATELET # BLD AUTO: 28 K/UL
PLATELET # BLD AUTO: 31 K/UL
PLATELET # BLD AUTO: 34 K/UL
PLATELET # BLD AUTO: 34 K/UL
PLATELET # BLD AUTO: 35 K/UL
PLATELET # BLD AUTO: 37 K/UL
PLATELET # BLD AUTO: 39 K/UL
PLATELET # BLD AUTO: 43 K/UL
PLATELET # BLD AUTO: 44 K/UL
PLATELET # BLD AUTO: 47 K/UL
PLATELET # BLD AUTO: 79 K/UL
PLATELET # BLD AUTO: 79 K/UL
PLATELET BLD QL SMEAR: ABNORMAL
PMV BLD AUTO: 10.1 FL
PMV BLD AUTO: 10.2 FL
PMV BLD AUTO: 10.5 FL
PMV BLD AUTO: 10.6 FL
PMV BLD AUTO: 10.8 FL
PMV BLD AUTO: 10.9 FL
PMV BLD AUTO: 11.2 FL
PMV BLD AUTO: 11.2 FL
PMV BLD AUTO: 11.3 FL
PMV BLD AUTO: 11.4 FL
PMV BLD AUTO: 11.4 FL
PMV BLD AUTO: 11.5 FL
PMV BLD AUTO: 11.6 FL
PMV BLD AUTO: 11.7 FL
PMV BLD AUTO: 11.7 FL
PMV BLD AUTO: 11.9 FL
PMV BLD AUTO: 12.2 FL
PMV BLD AUTO: 12.2 FL
PMV BLD AUTO: 12.3 FL
PMV BLD AUTO: 12.6 FL
PMV BLD AUTO: 12.8 FL
PMV BLD AUTO: 13.1 FL
PMV BLD AUTO: 13.4 FL
PMV BLD AUTO: 9.9 FL
PO2 BLDA: 19 MMHG (ref 40–60)
PO2 BLDA: 202 MMHG (ref 80–100)
PO2 BLDA: 34 MMHG (ref 40–60)
PO2 BLDA: 62 MMHG (ref 80–100)
PO2 BLDA: 63 MMHG (ref 80–100)
PO2 BLDA: 67 MMHG (ref 80–100)
PO2 BLDA: 73 MMHG (ref 80–100)
POC BE: -11 MMOL/L
POC BE: -6 MMOL/L
POC BE: -7 MMOL/L
POC BE: -8 MMOL/L
POC BE: -9 MMOL/L
POC BE: 0 MMOL/L
POC BE: 2 MMOL/L
POC IONIZED CALCIUM: 0.93 MMOL/L (ref 1.06–1.42)
POC SATURATED O2: 100 % (ref 95–100)
POC SATURATED O2: 34 % (ref 95–100)
POC SATURATED O2: 69 % (ref 95–100)
POC SATURATED O2: 91 % (ref 95–100)
POC SATURATED O2: 92 % (ref 95–100)
POC SATURATED O2: 92 % (ref 95–100)
POC SATURATED O2: 93 % (ref 95–100)
POC TCO2 (MEASURED): 23 MMOL/L (ref 23–29)
POC TCO2: 17 MMOL/L (ref 23–27)
POC TCO2: 17 MMOL/L (ref 23–27)
POC TCO2: 19 MMOL/L (ref 23–27)
POC TCO2: 20 MMOL/L (ref 23–27)
POC TCO2: 21 MMOL/L (ref 23–27)
POC TCO2: 25 MMOL/L (ref 24–29)
POC TCO2: 27 MMOL/L (ref 24–29)
POCT GLUCOSE: 136 MG/DL (ref 70–110)
POCT GLUCOSE: 140 MG/DL (ref 70–110)
POCT GLUCOSE: 155 MG/DL (ref 70–110)
POCT GLUCOSE: 210 MG/DL (ref 70–110)
POCT GLUCOSE: 218 MG/DL (ref 70–110)
POCT GLUCOSE: 228 MG/DL (ref 70–110)
POCT GLUCOSE: 237 MG/DL (ref 70–110)
POCT GLUCOSE: 244 MG/DL (ref 70–110)
POCT GLUCOSE: 247 MG/DL (ref 70–110)
POCT GLUCOSE: 273 MG/DL (ref 70–110)
POIKILOCYTOSIS BLD QL SMEAR: ABNORMAL
POIKILOCYTOSIS BLD QL SMEAR: SLIGHT
POLYCHROMASIA BLD QL SMEAR: ABNORMAL
POTASSIUM BLD-SCNC: 3.6 MMOL/L (ref 3.5–5.1)
POTASSIUM SERPL-SCNC: 2.7 MMOL/L
POTASSIUM SERPL-SCNC: 3.2 MMOL/L
POTASSIUM SERPL-SCNC: 3.2 MMOL/L
POTASSIUM SERPL-SCNC: 3.7 MMOL/L
POTASSIUM SERPL-SCNC: 3.8 MMOL/L
POTASSIUM SERPL-SCNC: 3.9 MMOL/L
POTASSIUM SERPL-SCNC: 4.1 MMOL/L
POTASSIUM SERPL-SCNC: 4.1 MMOL/L
POTASSIUM SERPL-SCNC: 4.2 MMOL/L
POTASSIUM SERPL-SCNC: 4.2 MMOL/L
POTASSIUM SERPL-SCNC: 4.3 MMOL/L
POTASSIUM SERPL-SCNC: 4.3 MMOL/L
POTASSIUM SERPL-SCNC: 4.4 MMOL/L
POTASSIUM SERPL-SCNC: 4.5 MMOL/L
POTASSIUM SERPL-SCNC: 4.7 MMOL/L
POTASSIUM SERPL-SCNC: 5.1 MMOL/L
POTASSIUM SERPL-SCNC: 5.3 MMOL/L
POTASSIUM SERPL-SCNC: 5.3 MMOL/L
POTASSIUM SERPL-SCNC: 5.6 MMOL/L
POTASSIUM SERPL-SCNC: 5.7 MMOL/L
POTASSIUM SERPL-SCNC: 6.2 MMOL/L
POTASSIUM SERPL-SCNC: 6.2 MMOL/L
POTASSIUM SERPL-SCNC: 6.6 MMOL/L
PROCALCITONIN SERPL IA-MCNC: 21.71 NG/ML
PROMYELOCYTES NFR BLD MANUAL: 0.5 %
PROMYELOCYTES NFR BLD MANUAL: 1 %
PROT SERPL-MCNC: 5 G/DL
PROT SERPL-MCNC: 5.4 G/DL
PROT SERPL-MCNC: 5.4 G/DL
PROT SERPL-MCNC: 5.5 G/DL
PROT SERPL-MCNC: 5.5 G/DL
PROT SERPL-MCNC: 5.7 G/DL
PROT SERPL-MCNC: 5.7 G/DL
PROT SERPL-MCNC: 5.8 G/DL
PROT SERPL-MCNC: 6.2 G/DL
PROT SERPL-MCNC: 6.4 G/DL
PROT SERPL-MCNC: 6.5 G/DL
PROT SERPL-MCNC: 6.9 G/DL
PROT SERPL-MCNC: 6.9 G/DL
PROT SERPL-MCNC: 7 G/DL
PROT SERPL-MCNC: 7.2 G/DL
PROT SERPL-MCNC: 7.2 G/DL
PROT SERPL-MCNC: 7.4 G/DL
PROT SERPL-MCNC: 7.5 G/DL
PROT SERPL-MCNC: 7.5 G/DL
PROT SERPL-MCNC: 7.7 G/DL
PROT SERPL-MCNC: 7.7 G/DL
PROT SERPL-MCNC: 7.9 G/DL
PROT UR QL STRIP: ABNORMAL
PROT UR QL STRIP: ABNORMAL
PROTHROMBIN TIME: 11 SEC
PROTHROMBIN TIME: 11 SEC
PROTHROMBIN TIME: 11.2 SEC
PROTHROMBIN TIME: 11.5 SEC
PROTHROMBIN TIME: 11.5 SEC
PROTHROMBIN TIME: 12.7 SEC
PROTHROMBIN TIME: 22.1 SEC
PROTHROMBIN TIME: 32.7 SEC
RBC # BLD AUTO: 1.79 M/UL
RBC # BLD AUTO: 1.89 M/UL
RBC # BLD AUTO: 1.93 M/UL
RBC # BLD AUTO: 1.99 M/UL
RBC # BLD AUTO: 2.07 M/UL
RBC # BLD AUTO: 2.17 M/UL
RBC # BLD AUTO: 2.2 M/UL
RBC # BLD AUTO: 2.2 M/UL
RBC # BLD AUTO: 2.27 M/UL
RBC # BLD AUTO: 2.28 M/UL
RBC # BLD AUTO: 2.32 M/UL
RBC # BLD AUTO: 2.36 M/UL
RBC # BLD AUTO: 2.38 M/UL
RBC # BLD AUTO: 2.39 M/UL
RBC # BLD AUTO: 2.39 M/UL
RBC # BLD AUTO: 2.47 M/UL
RBC # BLD AUTO: 2.49 M/UL
RBC # BLD AUTO: 2.52 M/UL
RBC # BLD AUTO: 2.52 M/UL
RBC # BLD AUTO: 2.64 M/UL
RBC # BLD AUTO: 2.65 M/UL
RBC # BLD AUTO: 2.66 M/UL
RBC # BLD AUTO: 3.64 M/UL
RBC # BLD AUTO: 3.72 M/UL
RBC # BLD AUTO: 3.74 M/UL
RBC # BLD AUTO: 3.8 M/UL
RBC # BLD AUTO: 3.83 M/UL
RBC #/AREA URNS AUTO: 0 /HPF (ref 0–4)
RBC #/AREA URNS AUTO: 0 /HPF (ref 0–4)
REASON FOR REFERRAL (NARRATIVE): NORMAL
REF LAB TEST METHOD: NORMAL
RETICS/RBC NFR AUTO: 0.5 %
RETICS/RBC NFR AUTO: 1.1 %
RETIRED EF AND QEF - SEE NOTES: 50 (ref 55–65)
RETIRED EF AND QEF - SEE NOTES: 55 (ref 55–65)
SAMPLE: ABNORMAL
SCHISTOCYTES BLD QL SMEAR: ABNORMAL
SERVICE CMNT-IMP: NORMAL
SITE: ABNORMAL
SMUDGE CELLS BLD QL SMEAR: PRESENT
SODIUM BLD-SCNC: 130 MMOL/L (ref 136–145)
SODIUM SERPL-SCNC: 121 MMOL/L
SODIUM SERPL-SCNC: 121 MMOL/L
SODIUM SERPL-SCNC: 122 MMOL/L
SODIUM SERPL-SCNC: 124 MMOL/L
SODIUM SERPL-SCNC: 125 MMOL/L
SODIUM SERPL-SCNC: 127 MMOL/L
SODIUM SERPL-SCNC: 129 MMOL/L
SODIUM SERPL-SCNC: 129 MMOL/L
SODIUM SERPL-SCNC: 130 MMOL/L
SODIUM SERPL-SCNC: 131 MMOL/L
SODIUM SERPL-SCNC: 135 MMOL/L
SODIUM SERPL-SCNC: 136 MMOL/L
SODIUM SERPL-SCNC: 136 MMOL/L
SODIUM SERPL-SCNC: 137 MMOL/L
SODIUM SERPL-SCNC: 138 MMOL/L
SODIUM SERPL-SCNC: 138 MMOL/L
SODIUM SERPL-SCNC: 139 MMOL/L
SODIUM SERPL-SCNC: 140 MMOL/L
SODIUM SERPL-SCNC: 141 MMOL/L
SODIUM SERPL-SCNC: 142 MMOL/L
SODIUM UR-SCNC: 34 MMOL/L
SP GR UR STRIP: 1.01 (ref 1–1.03)
SP GR UR STRIP: 1.02 (ref 1–1.03)
SP02: 93
SP02: 96
SP02: 98
SPECIMEN SOURCE: NORMAL
SPECIMEN TYPE: NORMAL
SPECIMEN: NORMAL
SQUAMOUS #/AREA URNS AUTO: 1 /HPF
SQUAMOUS #/AREA URNS AUTO: 2 /HPF
SSDQB TESTING DATE: NORMAL
SSDRB TESTING DATE: NORMAL
SSOA TESTING DATE: NORMAL
SSOB TESTING DATE: NORMAL
SSOC TESTING DATE: NORMAL
SSODR TESTING DATE: NORMAL
STOMATOCYTES BLD QL SMEAR: PRESENT
TARGETS BLD QL SMEAR: ABNORMAL
TEST PERFORMANCE INFO SPEC: NORMAL
TOXICOLOGY INFORMATION: NORMAL
TRICUSPID VALVE REGURGITATION: ABNORMAL
TRICUSPID VALVE REGURGITATION: NORMAL
TROPONIN I SERPL DL<=0.01 NG/ML-MCNC: 0.01 NG/ML
TROPONIN I SERPL DL<=0.01 NG/ML-MCNC: <0.006 NG/ML
TYSSO TESTING DATE: NORMAL
URATE SERPL-MCNC: 4 MG/DL
URATE SERPL-MCNC: 4.1 MG/DL
URATE SERPL-MCNC: 4.3 MG/DL
URATE SERPL-MCNC: 4.5 MG/DL
URATE SERPL-MCNC: 4.7 MG/DL
URATE SERPL-MCNC: 4.9 MG/DL
URATE SERPL-MCNC: 6.1 MG/DL
URATE SERPL-MCNC: 6.1 MG/DL
URATE SERPL-MCNC: 6.5 MG/DL
URATE SERPL-MCNC: 8.6 MG/DL
URN SPEC COLLECT METH UR: ABNORMAL
URN SPEC COLLECT METH UR: ABNORMAL
UROBILINOGEN UR STRIP-ACNC: 2 EU/DL
UROBILINOGEN UR STRIP-ACNC: NEGATIVE EU/DL
UUN UR-MCNC: 116 MG/DL
VANCOMYCIN SERPL-MCNC: 17.4 UG/ML
VIT B12 SERPL-MCNC: 479 PG/ML
VT: 400
VT: 450
VT: 450
WBC # BLD AUTO: 1.84 K/UL
WBC # BLD AUTO: 1.9 K/UL
WBC # BLD AUTO: 16.06 K/UL
WBC # BLD AUTO: 2.21 K/UL
WBC # BLD AUTO: 2.25 K/UL
WBC # BLD AUTO: 21.03 K/UL
WBC # BLD AUTO: 22.1 K/UL
WBC # BLD AUTO: 22.49 K/UL
WBC # BLD AUTO: 27.15 K/UL
WBC # BLD AUTO: 3.61 K/UL
WBC # BLD AUTO: 31.27 K/UL
WBC # BLD AUTO: 31.69 K/UL
WBC # BLD AUTO: 33.02 K/UL
WBC # BLD AUTO: 34.77 K/UL
WBC # BLD AUTO: 4.55 K/UL
WBC # BLD AUTO: 5.76 K/UL
WBC # BLD AUTO: 5.88 K/UL
WBC # BLD AUTO: 5.99 K/UL
WBC # BLD AUTO: 7.55 K/UL
WBC # BLD AUTO: 7.96 K/UL
WBC # BLD AUTO: 8.1 K/UL
WBC # BLD AUTO: 8.51 K/UL
WBC # BLD AUTO: 8.8 K/UL
WBC # BLD AUTO: 8.99 K/UL
WBC # BLD AUTO: 9.16 K/UL
WBC # BLD AUTO: 9.17 K/UL
WBC # BLD AUTO: 9.93 K/UL
WBC #/AREA URNS AUTO: 1 /HPF (ref 0–5)
WBC #/AREA URNS AUTO: 3 /HPF (ref 0–5)
WBC OTHER NFR BLD MANUAL: 30 %
WBC OTHER NFR BLD MANUAL: 4 %
WBC OTHER NFR BLD MANUAL: 67 %

## 2018-01-01 PROCEDURE — 84550 ASSAY OF BLOOD/URIC ACID: CPT

## 2018-01-01 PROCEDURE — 85610 PROTHROMBIN TIME: CPT

## 2018-01-01 PROCEDURE — 25000003 PHARM REV CODE 250: Performed by: EMERGENCY MEDICINE

## 2018-01-01 PROCEDURE — P9037 PLATE PHERES LEUKOREDU IRRAD: HCPCS

## 2018-01-01 PROCEDURE — 96365 THER/PROPH/DIAG IV INF INIT: CPT

## 2018-01-01 PROCEDURE — 82607 VITAMIN B-12: CPT

## 2018-01-01 PROCEDURE — 87077 CULTURE AEROBIC IDENTIFY: CPT

## 2018-01-01 PROCEDURE — 84100 ASSAY OF PHOSPHORUS: CPT

## 2018-01-01 PROCEDURE — 36430 TRANSFUSION BLD/BLD COMPNT: CPT

## 2018-01-01 PROCEDURE — 83605 ASSAY OF LACTIC ACID: CPT | Mod: 91

## 2018-01-01 PROCEDURE — 63600175 PHARM REV CODE 636 W HCPCS: Performed by: NURSE PRACTITIONER

## 2018-01-01 PROCEDURE — 85025 COMPLETE CBC W/AUTO DIFF WBC: CPT

## 2018-01-01 PROCEDURE — 96374 THER/PROPH/DIAG INJ IV PUSH: CPT

## 2018-01-01 PROCEDURE — 94761 N-INVAS EAR/PLS OXIMETRY MLT: CPT

## 2018-01-01 PROCEDURE — 25000003 PHARM REV CODE 250: Performed by: PHYSICIAN ASSISTANT

## 2018-01-01 PROCEDURE — 25000242 PHARM REV CODE 250 ALT 637 W/ HCPCS: Performed by: STUDENT IN AN ORGANIZED HEALTH CARE EDUCATION/TRAINING PROGRAM

## 2018-01-01 PROCEDURE — 83615 LACTATE (LD) (LDH) ENZYME: CPT

## 2018-01-01 PROCEDURE — 63600175 PHARM REV CODE 636 W HCPCS

## 2018-01-01 PROCEDURE — S0030 INJECTION, METRONIDAZOLE: HCPCS | Performed by: NURSE PRACTITIONER

## 2018-01-01 PROCEDURE — S0028 INJECTION, FAMOTIDINE, 20 MG: HCPCS | Performed by: PHYSICIAN ASSISTANT

## 2018-01-01 PROCEDURE — 25000003 PHARM REV CODE 250: Performed by: FAMILY MEDICINE

## 2018-01-01 PROCEDURE — 27000221 HC OXYGEN, UP TO 24 HOURS

## 2018-01-01 PROCEDURE — 36620 INSERTION CATHETER ARTERY: CPT

## 2018-01-01 PROCEDURE — 85007 BL SMEAR W/DIFF WBC COUNT: CPT

## 2018-01-01 PROCEDURE — 88299 UNLISTED CYTOGENETIC STUDY: CPT

## 2018-01-01 PROCEDURE — A4216 STERILE WATER/SALINE, 10 ML: HCPCS | Performed by: INTERNAL MEDICINE

## 2018-01-01 PROCEDURE — 63600175 PHARM REV CODE 636 W HCPCS: Performed by: INTERNAL MEDICINE

## 2018-01-01 PROCEDURE — 99213 OFFICE O/P EST LOW 20 MIN: CPT | Mod: PBBFAC,25 | Performed by: INTERNAL MEDICINE

## 2018-01-01 PROCEDURE — 81373 HLA I TYPING 1 LOCUS LR: CPT | Mod: 91

## 2018-01-01 PROCEDURE — 83605 ASSAY OF LACTIC ACID: CPT

## 2018-01-01 PROCEDURE — 86644 CMV ANTIBODY: CPT

## 2018-01-01 PROCEDURE — 83735 ASSAY OF MAGNESIUM: CPT

## 2018-01-01 PROCEDURE — 51702 INSERT TEMP BLADDER CATH: CPT

## 2018-01-01 PROCEDURE — 99213 OFFICE O/P EST LOW 20 MIN: CPT | Mod: PBBFAC,PN,25 | Performed by: INTERNAL MEDICINE

## 2018-01-01 PROCEDURE — 99291 CRITICAL CARE FIRST HOUR: CPT | Mod: 25

## 2018-01-01 PROCEDURE — 94002 VENT MGMT INPAT INIT DAY: CPT

## 2018-01-01 PROCEDURE — S4991 NICOTINE PATCH NONLEGEND: HCPCS | Performed by: STUDENT IN AN ORGANIZED HEALTH CARE EDUCATION/TRAINING PROGRAM

## 2018-01-01 PROCEDURE — 85007 BL SMEAR W/DIFF WBC COUNT: CPT | Mod: 91

## 2018-01-01 PROCEDURE — 81382 HLA II TYPING 1 LOC HR: CPT

## 2018-01-01 PROCEDURE — 85384 FIBRINOGEN ACTIVITY: CPT | Mod: 91

## 2018-01-01 PROCEDURE — 88341 IMHCHEM/IMCYTCHM EA ADD ANTB: CPT | Mod: 26,,, | Performed by: PATHOLOGY

## 2018-01-01 PROCEDURE — 94640 AIRWAY INHALATION TREATMENT: CPT

## 2018-01-01 PROCEDURE — 87081 CULTURE SCREEN ONLY: CPT

## 2018-01-01 PROCEDURE — 80053 COMPREHEN METABOLIC PANEL: CPT

## 2018-01-01 PROCEDURE — 36415 COLL VENOUS BLD VENIPUNCTURE: CPT

## 2018-01-01 PROCEDURE — S4991 NICOTINE PATCH NONLEGEND: HCPCS | Performed by: HOSPITALIST

## 2018-01-01 PROCEDURE — 63600175 PHARM REV CODE 636 W HCPCS: Performed by: EMERGENCY MEDICINE

## 2018-01-01 PROCEDURE — 25000003 PHARM REV CODE 250: Performed by: STUDENT IN AN ORGANIZED HEALTH CARE EDUCATION/TRAINING PROGRAM

## 2018-01-01 PROCEDURE — 0BH17EZ INSERTION OF ENDOTRACHEAL AIRWAY INTO TRACHEA, VIA NATURAL OR ARTIFICIAL OPENING: ICD-10-PCS | Performed by: INTERNAL MEDICINE

## 2018-01-01 PROCEDURE — 85730 THROMBOPLASTIN TIME PARTIAL: CPT

## 2018-01-01 PROCEDURE — 80048 BASIC METABOLIC PNL TOTAL CA: CPT

## 2018-01-01 PROCEDURE — 88185 FLOWCYTOMETRY/TC ADD-ON: CPT | Mod: 59 | Performed by: PATHOLOGY

## 2018-01-01 PROCEDURE — 99284 EMERGENCY DEPT VISIT MOD MDM: CPT | Mod: ,,, | Performed by: PHYSICIAN ASSISTANT

## 2018-01-01 PROCEDURE — 99232 SBSQ HOSP IP/OBS MODERATE 35: CPT | Mod: ,,, | Performed by: INTERNAL MEDICINE

## 2018-01-01 PROCEDURE — 88311 DECALCIFY TISSUE: CPT | Performed by: PATHOLOGY

## 2018-01-01 PROCEDURE — 76937 US GUIDE VASCULAR ACCESS: CPT

## 2018-01-01 PROCEDURE — 82550 ASSAY OF CK (CPK): CPT

## 2018-01-01 PROCEDURE — 86645 CMV ANTIBODY IGM: CPT

## 2018-01-01 PROCEDURE — 85027 COMPLETE CBC AUTOMATED: CPT

## 2018-01-01 PROCEDURE — 99205 OFFICE O/P NEW HI 60 MIN: CPT | Mod: S$PBB,,, | Performed by: INTERNAL MEDICINE

## 2018-01-01 PROCEDURE — 97110 THERAPEUTIC EXERCISES: CPT

## 2018-01-01 PROCEDURE — 87205 SMEAR GRAM STAIN: CPT

## 2018-01-01 PROCEDURE — 99291 CRITICAL CARE FIRST HOUR: CPT | Mod: ,,, | Performed by: EMERGENCY MEDICINE

## 2018-01-01 PROCEDURE — 85384 FIBRINOGEN ACTIVITY: CPT

## 2018-01-01 PROCEDURE — 25000003 PHARM REV CODE 250: Performed by: INTERNAL MEDICINE

## 2018-01-01 PROCEDURE — 88305 TISSUE EXAM BY PATHOLOGIST: CPT | Mod: 26,,, | Performed by: PATHOLOGY

## 2018-01-01 PROCEDURE — 88305 TISSUE EXAM BY PATHOLOGIST: CPT | Performed by: PATHOLOGY

## 2018-01-01 PROCEDURE — 81001 URINALYSIS AUTO W/SCOPE: CPT

## 2018-01-01 PROCEDURE — 85027 COMPLETE CBC AUTOMATED: CPT | Mod: 91

## 2018-01-01 PROCEDURE — 99233 SBSQ HOSP IP/OBS HIGH 50: CPT | Mod: ,,, | Performed by: INTERNAL MEDICINE

## 2018-01-01 PROCEDURE — 99232 SBSQ HOSP IP/OBS MODERATE 35: CPT | Mod: ,,, | Performed by: PHYSICIAN ASSISTANT

## 2018-01-01 PROCEDURE — 63600175 PHARM REV CODE 636 W HCPCS: Performed by: STUDENT IN AN ORGANIZED HEALTH CARE EDUCATION/TRAINING PROGRAM

## 2018-01-01 PROCEDURE — 63600175 PHARM REV CODE 636 W HCPCS: Performed by: HOSPITALIST

## 2018-01-01 PROCEDURE — 25000003 PHARM REV CODE 250: Performed by: HOSPITALIST

## 2018-01-01 PROCEDURE — 97161 PT EVAL LOW COMPLEX 20 MIN: CPT

## 2018-01-01 PROCEDURE — 81380 HLA I TYPING 1 LOCUS HR: CPT | Mod: 91

## 2018-01-01 PROCEDURE — 20600001 HC STEP DOWN PRIVATE ROOM

## 2018-01-01 PROCEDURE — 25000242 PHARM REV CODE 250 ALT 637 W/ HCPCS: Performed by: HOSPITALIST

## 2018-01-01 PROCEDURE — 93010 ELECTROCARDIOGRAM REPORT: CPT | Mod: ,,, | Performed by: INTERNAL MEDICINE

## 2018-01-01 PROCEDURE — 25500020 PHARM REV CODE 255: Performed by: INTERNAL MEDICINE

## 2018-01-01 PROCEDURE — 81450 HL NEO GSAP 5-50DNA/DNA&RNA: CPT

## 2018-01-01 PROCEDURE — P9040 RBC LEUKOREDUCED IRRADIATED: HCPCS

## 2018-01-01 PROCEDURE — 86901 BLOOD TYPING SEROLOGIC RH(D): CPT

## 2018-01-01 PROCEDURE — 88271 CYTOGENETICS DNA PROBE: CPT | Mod: 59

## 2018-01-01 PROCEDURE — 96361 HYDRATE IV INFUSION ADD-ON: CPT

## 2018-01-01 PROCEDURE — 99900035 HC TECH TIME PER 15 MIN (STAT)

## 2018-01-01 PROCEDURE — 81382 HLA II TYPING 1 LOC HR: CPT | Mod: 91

## 2018-01-01 PROCEDURE — 96523 IRRIG DRUG DELIVERY DEVICE: CPT

## 2018-01-01 PROCEDURE — 86078 PHYS BLOOD BANK SERV REACTJ: CPT | Mod: ,,, | Performed by: PATHOLOGY

## 2018-01-01 PROCEDURE — 31500 INSERT EMERGENCY AIRWAY: CPT

## 2018-01-01 PROCEDURE — 36620 INSERTION CATHETER ARTERY: CPT | Mod: ,,, | Performed by: NURSE PRACTITIONER

## 2018-01-01 PROCEDURE — 86707 HEPATITIS BE ANTIBODY: CPT

## 2018-01-01 PROCEDURE — 99215 OFFICE O/P EST HI 40 MIN: CPT | Mod: S$PBB,,, | Performed by: INTERNAL MEDICINE

## 2018-01-01 PROCEDURE — 83010 ASSAY OF HAPTOGLOBIN QUANT: CPT

## 2018-01-01 PROCEDURE — 82955 ASSAY OF G6PD ENZYME: CPT

## 2018-01-01 PROCEDURE — 88313 SPECIAL STAINS GROUP 2: CPT

## 2018-01-01 PROCEDURE — 02HV33Z INSERTION OF INFUSION DEVICE INTO SUPERIOR VENA CAVA, PERCUTANEOUS APPROACH: ICD-10-PCS | Performed by: INTERNAL MEDICINE

## 2018-01-01 PROCEDURE — 87040 BLOOD CULTURE FOR BACTERIA: CPT | Mod: 59

## 2018-01-01 PROCEDURE — 88275 CYTOGENETICS 100-300: CPT | Mod: 59

## 2018-01-01 PROCEDURE — 96375 TX/PRO/DX INJ NEW DRUG ADDON: CPT

## 2018-01-01 PROCEDURE — 80053 COMPREHEN METABOLIC PANEL: CPT | Mod: 91

## 2018-01-01 PROCEDURE — 37799 UNLISTED PX VASCULAR SURGERY: CPT

## 2018-01-01 PROCEDURE — A4216 STERILE WATER/SALINE, 10 ML: HCPCS | Performed by: NURSE PRACTITIONER

## 2018-01-01 PROCEDURE — 94003 VENT MGMT INPAT SUBQ DAY: CPT

## 2018-01-01 PROCEDURE — 88311 DECALCIFY TISSUE: CPT | Mod: 26,,, | Performed by: PATHOLOGY

## 2018-01-01 PROCEDURE — 87186 SC STD MICRODIL/AGAR DIL: CPT | Mod: 59

## 2018-01-01 PROCEDURE — 25000242 PHARM REV CODE 250 ALT 637 W/ HCPCS

## 2018-01-01 PROCEDURE — 99233 SBSQ HOSP IP/OBS HIGH 50: CPT | Mod: ,,, | Performed by: PHYSICIAN ASSISTANT

## 2018-01-01 PROCEDURE — 25000242 PHARM REV CODE 250 ALT 637 W/ HCPCS: Performed by: NURSE PRACTITIONER

## 2018-01-01 PROCEDURE — 88184 FLOWCYTOMETRY/ TC 1 MARKER: CPT | Performed by: PATHOLOGY

## 2018-01-01 PROCEDURE — 87880 STREP A ASSAY W/OPTIC: CPT

## 2018-01-01 PROCEDURE — G0378 HOSPITAL OBSERVATION PER HR: HCPCS

## 2018-01-01 PROCEDURE — 80074 ACUTE HEPATITIS PANEL: CPT

## 2018-01-01 PROCEDURE — 86703 HIV-1/HIV-2 1 RESULT ANTBDY: CPT

## 2018-01-01 PROCEDURE — P9038 RBC IRRADIATED: HCPCS

## 2018-01-01 PROCEDURE — 99900026 HC AIRWAY MAINTENANCE (STAT)

## 2018-01-01 PROCEDURE — 85060 BLOOD SMEAR INTERPRETATION: CPT | Mod: ,,, | Performed by: PATHOLOGY

## 2018-01-01 PROCEDURE — 86920 COMPATIBILITY TEST SPIN: CPT

## 2018-01-01 PROCEDURE — 99223 1ST HOSP IP/OBS HIGH 75: CPT | Mod: ,,, | Performed by: INTERNAL MEDICINE

## 2018-01-01 PROCEDURE — 20000000 HC ICU ROOM

## 2018-01-01 PROCEDURE — 99386 PREV VISIT NEW AGE 40-64: CPT | Mod: S$PBB,,, | Performed by: FAMILY MEDICINE

## 2018-01-01 PROCEDURE — 87517 HEPATITIS B DNA QUANT: CPT

## 2018-01-01 PROCEDURE — 99283 EMERGENCY DEPT VISIT LOW MDM: CPT | Mod: ,,, | Performed by: PHYSICIAN ASSISTANT

## 2018-01-01 PROCEDURE — 85730 THROMBOPLASTIN TIME PARTIAL: CPT | Mod: 91

## 2018-01-01 PROCEDURE — 25000003 PHARM REV CODE 250: Performed by: NURSE PRACTITIONER

## 2018-01-01 PROCEDURE — A4216 STERILE WATER/SALINE, 10 ML: HCPCS | Performed by: STUDENT IN AN ORGANIZED HEALTH CARE EDUCATION/TRAINING PROGRAM

## 2018-01-01 PROCEDURE — C1751 CATH, INF, PER/CENT/MIDLINE: HCPCS

## 2018-01-01 PROCEDURE — 99999 PR PBB SHADOW E&M-EST. PATIENT-LVL V: CPT | Mod: PBBFAC,,, | Performed by: FAMILY MEDICINE

## 2018-01-01 PROCEDURE — 99285 EMERGENCY DEPT VISIT HI MDM: CPT | Mod: 25

## 2018-01-01 PROCEDURE — 84100 ASSAY OF PHOSPHORUS: CPT | Mod: 91

## 2018-01-01 PROCEDURE — 87070 CULTURE OTHR SPECIMN AEROBIC: CPT

## 2018-01-01 PROCEDURE — 87086 URINE CULTURE/COLONY COUNT: CPT

## 2018-01-01 PROCEDURE — 82105 ALPHA-FETOPROTEIN SERUM: CPT

## 2018-01-01 PROCEDURE — 99223 1ST HOSP IP/OBS HIGH 75: CPT | Mod: ,,, | Performed by: HOSPITALIST

## 2018-01-01 PROCEDURE — 85007 BL SMEAR W/DIFF WBC COUNT: CPT | Mod: NCS

## 2018-01-01 PROCEDURE — 84145 PROCALCITONIN (PCT): CPT

## 2018-01-01 PROCEDURE — 83735 ASSAY OF MAGNESIUM: CPT | Mod: 91

## 2018-01-01 PROCEDURE — 97165 OT EVAL LOW COMPLEX 30 MIN: CPT

## 2018-01-01 PROCEDURE — 27100092 HC HIGH FLOW DELIVERY CANNULA

## 2018-01-01 PROCEDURE — 81376 HLA II TYPING 1 LOCUS LR: CPT

## 2018-01-01 PROCEDURE — C9113 INJ PANTOPRAZOLE SODIUM, VIA: HCPCS

## 2018-01-01 PROCEDURE — 38222 DX BONE MARROW BX & ASPIR: CPT | Mod: RT,,, | Performed by: NURSE PRACTITIONER

## 2018-01-01 PROCEDURE — 27201040 HC RC 50 FILTER

## 2018-01-01 PROCEDURE — 85045 AUTOMATED RETICULOCYTE COUNT: CPT

## 2018-01-01 PROCEDURE — 36569 INSJ PICC 5 YR+ W/O IMAGING: CPT

## 2018-01-01 PROCEDURE — 63600175 PHARM REV CODE 636 W HCPCS: Performed by: PHYSICIAN ASSISTANT

## 2018-01-01 PROCEDURE — 93306 TTE W/DOPPLER COMPLETE: CPT | Mod: 26,,, | Performed by: INTERNAL MEDICINE

## 2018-01-01 PROCEDURE — 81380 HLA I TYPING 1 LOCUS HR: CPT

## 2018-01-01 PROCEDURE — 99291 CRITICAL CARE FIRST HOUR: CPT | Mod: ,,, | Performed by: INTERNAL MEDICINE

## 2018-01-01 PROCEDURE — 96376 TX/PRO/DX INJ SAME DRUG ADON: CPT

## 2018-01-01 PROCEDURE — 88341 IMHCHEM/IMCYTCHM EA ADD ANTB: CPT | Performed by: PATHOLOGY

## 2018-01-01 PROCEDURE — 99239 HOSP IP/OBS DSCHRG MGMT >30: CPT | Mod: ,,, | Performed by: PHYSICIAN ASSISTANT

## 2018-01-01 PROCEDURE — 80307 DRUG TEST PRSMV CHEM ANLYZR: CPT

## 2018-01-01 PROCEDURE — 88264 CHROMOSOME ANALYSIS 20-25: CPT

## 2018-01-01 PROCEDURE — 25000242 PHARM REV CODE 250 ALT 637 W/ HCPCS: Performed by: EMERGENCY MEDICINE

## 2018-01-01 PROCEDURE — S4991 NICOTINE PATCH NONLEGEND: HCPCS | Performed by: INTERNAL MEDICINE

## 2018-01-01 PROCEDURE — 80202 ASSAY OF VANCOMYCIN: CPT

## 2018-01-01 PROCEDURE — 84484 ASSAY OF TROPONIN QUANT: CPT

## 2018-01-01 PROCEDURE — 85610 PROTHROMBIN TIME: CPT | Mod: 91

## 2018-01-01 PROCEDURE — 84484 ASSAY OF TROPONIN QUANT: CPT | Mod: 91

## 2018-01-01 PROCEDURE — 88275 CYTOGENETICS 100-300: CPT

## 2018-01-01 PROCEDURE — 99220 PR INITIAL OBSERVATION CARE,LEVL III: CPT | Mod: ,,, | Performed by: INTERNAL MEDICINE

## 2018-01-01 PROCEDURE — 85097 BONE MARROW INTERPRETATION: CPT | Mod: ,,, | Performed by: PATHOLOGY

## 2018-01-01 PROCEDURE — G0379 DIRECT REFER HOSPITAL OBSERV: HCPCS

## 2018-01-01 PROCEDURE — 99226 PR SUBSEQUENT OBSERVATION CARE,LEVEL III: CPT | Mod: ,,, | Performed by: INTERNAL MEDICINE

## 2018-01-01 PROCEDURE — 99285 EMERGENCY DEPT VISIT HI MDM: CPT | Mod: ,,, | Performed by: EMERGENCY MEDICINE

## 2018-01-01 PROCEDURE — 81373 HLA I TYPING 1 LOCUS LR: CPT

## 2018-01-01 PROCEDURE — 36556 INSERT NON-TUNNEL CV CATH: CPT | Mod: ,,, | Performed by: INTERNAL MEDICINE

## 2018-01-01 PROCEDURE — 25000003 PHARM REV CODE 250

## 2018-01-01 PROCEDURE — 99999 PR PBB SHADOW E&M-EST. PATIENT-LVL III: CPT | Mod: PBBFAC,,, | Performed by: INTERNAL MEDICINE

## 2018-01-01 PROCEDURE — 99284 EMERGENCY DEPT VISIT MOD MDM: CPT | Mod: 25

## 2018-01-01 PROCEDURE — 43752 NASAL/OROGASTRIC W/TUBE PLMT: CPT

## 2018-01-01 PROCEDURE — 83036 HEMOGLOBIN GLYCOSYLATED A1C: CPT

## 2018-01-01 PROCEDURE — 82803 BLOOD GASES ANY COMBINATION: CPT

## 2018-01-01 PROCEDURE — 87400 INFLUENZA A/B EACH AG IA: CPT

## 2018-01-01 PROCEDURE — 81376 HLA II TYPING 1 LOCUS LR: CPT | Mod: 91

## 2018-01-01 PROCEDURE — 36556 INSERT NON-TUNNEL CV CATH: CPT

## 2018-01-01 PROCEDURE — 82330 ASSAY OF CALCIUM: CPT

## 2018-01-01 PROCEDURE — 88237 TISSUE CULTURE BONE MARROW: CPT

## 2018-01-01 PROCEDURE — 5A1945Z RESPIRATORY VENTILATION, 24-96 CONSECUTIVE HOURS: ICD-10-PCS | Performed by: INTERNAL MEDICINE

## 2018-01-01 PROCEDURE — 99215 OFFICE O/P EST HI 40 MIN: CPT | Mod: PBBFAC,PN | Performed by: FAMILY MEDICINE

## 2018-01-01 PROCEDURE — 88313 SPECIAL STAINS GROUP 2: CPT | Mod: 26,,, | Performed by: PATHOLOGY

## 2018-01-01 PROCEDURE — 82728 ASSAY OF FERRITIN: CPT

## 2018-01-01 PROCEDURE — 86880 COOMBS TEST DIRECT: CPT

## 2018-01-01 PROCEDURE — 93005 ELECTROCARDIOGRAM TRACING: CPT

## 2018-01-01 PROCEDURE — 99213 OFFICE O/P EST LOW 20 MIN: CPT | Mod: PBBFAC | Performed by: INTERNAL MEDICINE

## 2018-01-01 PROCEDURE — 86665 EPSTEIN-BARR CAPSID VCA: CPT | Mod: 59

## 2018-01-01 PROCEDURE — 27100171 HC OXYGEN HIGH FLOW UP TO 24 HOURS

## 2018-01-01 PROCEDURE — 84550 ASSAY OF BLOOD/URIC ACID: CPT | Mod: 91

## 2018-01-01 PROCEDURE — 27200188 HC TRANSDUCER, ART ADULT/PEDS

## 2018-01-01 PROCEDURE — 99291 CRITICAL CARE FIRST HOUR: CPT | Mod: 25,,, | Performed by: INTERNAL MEDICINE

## 2018-01-01 PROCEDURE — 83615 LACTATE (LD) (LDH) ENZYME: CPT | Mod: 91

## 2018-01-01 PROCEDURE — 25000242 PHARM REV CODE 250 ALT 637 W/ HCPCS: Performed by: INTERNAL MEDICINE

## 2018-01-01 PROCEDURE — 86038 ANTINUCLEAR ANTIBODIES: CPT

## 2018-01-01 PROCEDURE — 07DR3ZX EXTRACTION OF ILIAC BONE MARROW, PERCUTANEOUS APPROACH, DIAGNOSTIC: ICD-10-PCS | Performed by: INTERNAL MEDICINE

## 2018-01-01 PROCEDURE — 82746 ASSAY OF FOLIC ACID SERUM: CPT

## 2018-01-01 PROCEDURE — 11000001 HC ACUTE MED/SURG PRIVATE ROOM

## 2018-01-01 PROCEDURE — 81479 UNLISTED MOLECULAR PATHOLOGY: CPT

## 2018-01-01 PROCEDURE — 88184 FLOWCYTOMETRY/ TC 1 MARKER: CPT | Mod: 59 | Performed by: PATHOLOGY

## 2018-01-01 PROCEDURE — 84540 ASSAY OF URINE/UREA-N: CPT

## 2018-01-01 PROCEDURE — 86704 HEP B CORE ANTIBODY TOTAL: CPT

## 2018-01-01 PROCEDURE — 27200966 HC CLOSED SUCTION SYSTEM

## 2018-01-01 PROCEDURE — 86850 RBC ANTIBODY SCREEN: CPT

## 2018-01-01 PROCEDURE — 84300 ASSAY OF URINE SODIUM: CPT

## 2018-01-01 PROCEDURE — 85025 COMPLETE CBC W/AUTO DIFF WBC: CPT | Mod: 91

## 2018-01-01 PROCEDURE — 88342 IMHCHEM/IMCYTCHM 1ST ANTB: CPT | Mod: 26,,, | Performed by: PATHOLOGY

## 2018-01-01 PROCEDURE — 12000002 HC ACUTE/MED SURGE SEMI-PRIVATE ROOM

## 2018-01-01 PROCEDURE — 36592 COLLECT BLOOD FROM PICC: CPT | Mod: 59

## 2018-01-01 PROCEDURE — 88189 FLOWCYTOMETRY/READ 16 & >: CPT | Mod: 91,,, | Performed by: PATHOLOGY

## 2018-01-01 PROCEDURE — 93010 ELECTROCARDIOGRAM REPORT: CPT | Mod: 76,,, | Performed by: INTERNAL MEDICINE

## 2018-01-01 PROCEDURE — 99220 PR INITIAL OBSERVATION CARE,LEVL III: CPT | Mod: ,,, | Performed by: HOSPITALIST

## 2018-01-01 PROCEDURE — 87350 HEPATITIS BE AG IA: CPT

## 2018-01-01 PROCEDURE — 99239 HOSP IP/OBS DSCHRG MGMT >30: CPT | Mod: ,,, | Performed by: INTERNAL MEDICINE

## 2018-01-01 PROCEDURE — 93306 TTE W/DOPPLER COMPLETE: CPT

## 2018-01-01 PROCEDURE — 97116 GAIT TRAINING THERAPY: CPT

## 2018-01-01 PROCEDURE — 86738 MYCOPLASMA ANTIBODY: CPT

## 2018-01-01 PROCEDURE — 86747 PARVOVIRUS ANTIBODY: CPT | Mod: 91

## 2018-01-01 PROCEDURE — 96365 THER/PROPH/DIAG IV INF INIT: CPT | Mod: 59

## 2018-01-01 PROCEDURE — 80048 BASIC METABOLIC PNL TOTAL CA: CPT | Mod: 91

## 2018-01-01 PROCEDURE — 88189 FLOWCYTOMETRY/READ 16 & >: CPT | Mod: ,,, | Performed by: PATHOLOGY

## 2018-01-01 RX ORDER — LAMIVUDINE 100 MG/1
100 TABLET, FILM COATED ORAL DAILY
Qty: 90 TABLET | Refills: 0 | Status: SHIPPED | OUTPATIENT
Start: 2018-01-01 | End: 2018-10-12 | Stop reason: HOSPADM

## 2018-01-01 RX ORDER — EPINEPHRINE 0.1 MG/ML
INJECTION INTRAVENOUS CODE/TRAUMA/SEDATION MEDICATION
Status: COMPLETED | OUTPATIENT
Start: 2018-01-01 | End: 2018-01-01

## 2018-01-01 RX ORDER — HEPARIN 100 UNIT/ML
500 SYRINGE INTRAVENOUS
Status: COMPLETED | OUTPATIENT
Start: 2018-01-01 | End: 2018-01-01

## 2018-01-01 RX ORDER — NOREPINEPHRINE BITARTRATE/D5W 4MG/250ML
PLASTIC BAG, INJECTION (ML) INTRAVENOUS
Status: COMPLETED
Start: 2018-01-01 | End: 2018-01-01

## 2018-01-01 RX ORDER — ACETAMINOPHEN 325 MG/1
650 TABLET ORAL ONCE AS NEEDED
Status: COMPLETED | OUTPATIENT
Start: 2018-01-01 | End: 2018-01-01

## 2018-01-01 RX ORDER — AZITHROMYCIN 250 MG/1
250 TABLET, FILM COATED ORAL DAILY
Status: DISCONTINUED | OUTPATIENT
Start: 2018-01-01 | End: 2018-01-01

## 2018-01-01 RX ORDER — SUCCINYLCHOLINE CHLORIDE 20 MG/ML
160 INJECTION INTRAMUSCULAR; INTRAVENOUS ONCE
Status: DISCONTINUED | OUTPATIENT
Start: 2018-01-01 | End: 2018-01-01

## 2018-01-01 RX ORDER — SODIUM CHLORIDE 0.9 % (FLUSH) 0.9 %
10 SYRINGE (ML) INJECTION
Status: COMPLETED | OUTPATIENT
Start: 2018-01-01 | End: 2018-01-01

## 2018-01-01 RX ORDER — POTASSIUM CHLORIDE 20 MEQ/1
40 TABLET, EXTENDED RELEASE ORAL ONCE
Status: COMPLETED | OUTPATIENT
Start: 2018-01-01 | End: 2018-01-01

## 2018-01-01 RX ORDER — IBUPROFEN 200 MG
1 TABLET ORAL DAILY
Qty: 14 PATCH | Refills: 0 | Status: SHIPPED | OUTPATIENT
Start: 2018-01-01 | End: 2018-10-12 | Stop reason: HOSPADM

## 2018-01-01 RX ORDER — HYDROCODONE BITARTRATE AND ACETAMINOPHEN 500; 5 MG/1; MG/1
TABLET ORAL
Status: DISCONTINUED | OUTPATIENT
Start: 2018-01-01 | End: 2018-01-01 | Stop reason: HOSPADM

## 2018-01-01 RX ORDER — CARVEDILOL 12.5 MG/1
12.5 TABLET ORAL 2 TIMES DAILY WITH MEALS
Qty: 180 TABLET | Refills: 0 | Status: SHIPPED | OUTPATIENT
Start: 2018-01-01 | End: 2018-10-12 | Stop reason: HOSPADM

## 2018-01-01 RX ORDER — AMLODIPINE BESYLATE 10 MG/1
10 TABLET ORAL DAILY
COMMUNITY
End: 2018-01-01 | Stop reason: SDUPTHER

## 2018-01-01 RX ORDER — IPRATROPIUM BROMIDE AND ALBUTEROL SULFATE 2.5; .5 MG/3ML; MG/3ML
3 SOLUTION RESPIRATORY (INHALATION)
Status: DISCONTINUED | OUTPATIENT
Start: 2018-01-01 | End: 2018-01-01 | Stop reason: HOSPADM

## 2018-01-01 RX ORDER — AMLODIPINE BESYLATE 10 MG/1
10 TABLET ORAL DAILY
Status: DISCONTINUED | OUTPATIENT
Start: 2018-01-01 | End: 2018-01-01 | Stop reason: HOSPADM

## 2018-01-01 RX ORDER — HEPARIN 100 UNIT/ML
500 SYRINGE INTRAVENOUS
Status: CANCELLED | OUTPATIENT
Start: 2018-01-01

## 2018-01-01 RX ORDER — SODIUM CHLORIDE 0.9 % (FLUSH) 0.9 %
5 SYRINGE (ML) INJECTION
Status: DISCONTINUED | OUTPATIENT
Start: 2018-01-01 | End: 2018-01-01 | Stop reason: HOSPADM

## 2018-01-01 RX ORDER — HYDROCODONE BITARTRATE AND ACETAMINOPHEN 500; 5 MG/1; MG/1
TABLET ORAL ONCE
Status: CANCELLED | OUTPATIENT
Start: 2018-01-01 | End: 2018-01-01

## 2018-01-01 RX ORDER — PREDNISONE 20 MG/1
40 TABLET ORAL DAILY
Qty: 6 TABLET | Refills: 0 | Status: SHIPPED | OUTPATIENT
Start: 2018-01-01 | End: 2018-01-01

## 2018-01-01 RX ORDER — ATORVASTATIN CALCIUM 40 MG/1
40 TABLET, FILM COATED ORAL DAILY
Qty: 90 TABLET | Refills: 0 | Status: ON HOLD | OUTPATIENT
Start: 2018-01-01 | End: 2018-01-01 | Stop reason: SDUPTHER

## 2018-01-01 RX ORDER — ATORVASTATIN CALCIUM 20 MG/1
40 TABLET, FILM COATED ORAL DAILY
Status: DISCONTINUED | OUTPATIENT
Start: 2018-01-01 | End: 2018-01-01 | Stop reason: HOSPADM

## 2018-01-01 RX ORDER — CEFEPIME HYDROCHLORIDE 2 G/1
2 INJECTION, POWDER, FOR SOLUTION INTRAVENOUS
Status: DISCONTINUED | OUTPATIENT
Start: 2018-01-01 | End: 2018-01-01

## 2018-01-01 RX ORDER — EPINEPHRINE 1 MG/ML
INJECTION, SOLUTION INTRACARDIAC; INTRAMUSCULAR; INTRAVENOUS; SUBCUTANEOUS
Status: COMPLETED
Start: 2018-01-01 | End: 2018-01-01

## 2018-01-01 RX ORDER — IPRATROPIUM BROMIDE AND ALBUTEROL SULFATE 2.5; .5 MG/3ML; MG/3ML
3 SOLUTION RESPIRATORY (INHALATION) CONTINUOUS
Status: DISCONTINUED | OUTPATIENT
Start: 2018-01-01 | End: 2018-01-01 | Stop reason: HOSPADM

## 2018-01-01 RX ORDER — CARVEDILOL 25 MG/1
25 TABLET ORAL 2 TIMES DAILY WITH MEALS
Qty: 180 TABLET | Refills: 0 | Status: ON HOLD | OUTPATIENT
Start: 2018-01-01 | End: 2018-01-01 | Stop reason: SDUPTHER

## 2018-01-01 RX ORDER — NOREPINEPHRINE BITARTRATE/D5W 4MG/250ML
0.02 PLASTIC BAG, INJECTION (ML) INTRAVENOUS CONTINUOUS
Status: DISCONTINUED | OUTPATIENT
Start: 2018-01-01 | End: 2018-01-01

## 2018-01-01 RX ORDER — ALBUTEROL SULFATE 90 UG/1
1-2 AEROSOL, METERED RESPIRATORY (INHALATION) EVERY 4 HOURS PRN
Qty: 18 G | Refills: 0 | Status: SHIPPED | OUTPATIENT
Start: 2018-01-01 | End: 2018-10-12 | Stop reason: HOSPADM

## 2018-01-01 RX ORDER — CIPROFLOXACIN 500 MG/1
500 TABLET ORAL EVERY 12 HOURS
Qty: 28 TABLET | Refills: 0 | Status: SHIPPED | OUTPATIENT
Start: 2018-01-01 | End: 2018-01-01

## 2018-01-01 RX ORDER — CEFTRIAXONE 1 G/1
1 INJECTION, POWDER, FOR SOLUTION INTRAMUSCULAR; INTRAVENOUS
Status: DISCONTINUED | OUTPATIENT
Start: 2018-01-01 | End: 2018-01-01

## 2018-01-01 RX ORDER — LIDOCAINE HYDROCHLORIDE 10 MG/ML
INJECTION INFILTRATION; PERINEURAL
Status: DISPENSED
Start: 2018-01-01 | End: 2018-01-01

## 2018-01-01 RX ORDER — ATORVASTATIN CALCIUM 40 MG/1
40 TABLET, FILM COATED ORAL DAILY
Qty: 90 TABLET | Refills: 0 | Status: SHIPPED | OUTPATIENT
Start: 2018-01-01 | End: 2018-01-01 | Stop reason: SDUPTHER

## 2018-01-01 RX ORDER — VANCOMYCIN 2 GRAM/500 ML IN 0.9 % SODIUM CHLORIDE INTRAVENOUS
2000
Status: COMPLETED | OUTPATIENT
Start: 2018-01-01 | End: 2018-01-01

## 2018-01-01 RX ORDER — IPRATROPIUM BROMIDE AND ALBUTEROL SULFATE 2.5; .5 MG/3ML; MG/3ML
SOLUTION RESPIRATORY (INHALATION)
Status: COMPLETED
Start: 2018-01-01 | End: 2018-01-01

## 2018-01-01 RX ORDER — DIPHENHYDRAMINE HCL 25 MG
25 CAPSULE ORAL
Status: COMPLETED | OUTPATIENT
Start: 2018-01-01 | End: 2018-01-01

## 2018-01-01 RX ORDER — LISINOPRIL 20 MG/1
40 TABLET ORAL DAILY
Status: DISCONTINUED | OUTPATIENT
Start: 2018-01-01 | End: 2018-01-01

## 2018-01-01 RX ORDER — MOXIFLOXACIN HYDROCHLORIDE 400 MG/1
400 TABLET ORAL DAILY
Status: COMPLETED | OUTPATIENT
Start: 2018-01-01 | End: 2018-01-01

## 2018-01-01 RX ORDER — LIDOCAINE HYDROCHLORIDE 10 MG/ML
20 INJECTION INFILTRATION; PERINEURAL
Status: DISCONTINUED | OUTPATIENT
Start: 2018-01-01 | End: 2018-01-01 | Stop reason: HOSPADM

## 2018-01-01 RX ORDER — CLINDAMYCIN HYDROCHLORIDE 150 MG/1
300 CAPSULE ORAL EVERY 6 HOURS
Status: DISCONTINUED | OUTPATIENT
Start: 2018-01-01 | End: 2018-01-01 | Stop reason: HOSPADM

## 2018-01-01 RX ORDER — CEFEPIME HYDROCHLORIDE 1 G/1
1 INJECTION, POWDER, FOR SOLUTION INTRAMUSCULAR; INTRAVENOUS
Status: DISCONTINUED | OUTPATIENT
Start: 2018-01-01 | End: 2018-01-01

## 2018-01-01 RX ORDER — FENTANYL CITRATE 50 UG/ML
50 INJECTION, SOLUTION INTRAMUSCULAR; INTRAVENOUS
Status: DISCONTINUED | OUTPATIENT
Start: 2018-01-01 | End: 2018-10-12 | Stop reason: HOSPADM

## 2018-01-01 RX ORDER — ACETAMINOPHEN 500 MG
500 TABLET ORAL EVERY 6 HOURS PRN
Status: DISCONTINUED | OUTPATIENT
Start: 2018-01-01 | End: 2018-01-01 | Stop reason: HOSPADM

## 2018-01-01 RX ORDER — ATORVASTATIN CALCIUM 40 MG/1
40 TABLET, FILM COATED ORAL DAILY
Qty: 90 TABLET | Refills: 0 | Status: SHIPPED | OUTPATIENT
Start: 2018-01-01 | End: 2018-10-12 | Stop reason: HOSPADM

## 2018-01-01 RX ORDER — HYDROCHLOROTHIAZIDE 25 MG/1
25 TABLET ORAL DAILY
Qty: 90 TABLET | Refills: 2 | Status: ON HOLD | OUTPATIENT
Start: 2018-01-01 | End: 2018-01-01 | Stop reason: HOSPADM

## 2018-01-01 RX ORDER — ACETAMINOPHEN 500 MG
500 TABLET ORAL EVERY 6 HOURS PRN
Status: DISCONTINUED | OUTPATIENT
Start: 2018-01-01 | End: 2018-01-01

## 2018-01-01 RX ORDER — GLYCOPYRROLATE 1 MG/5ML
2 SOLUTION ORAL 4 TIMES DAILY PRN
Status: DISCONTINUED | OUTPATIENT
Start: 2018-01-01 | End: 2018-01-01

## 2018-01-01 RX ORDER — NAPROXEN SODIUM 220 MG/1
81 TABLET, FILM COATED ORAL DAILY
Status: DISCONTINUED | OUTPATIENT
Start: 2018-01-01 | End: 2018-01-01 | Stop reason: HOSPADM

## 2018-01-01 RX ORDER — HEPARIN SODIUM 5000 [USP'U]/ML
5000 INJECTION, SOLUTION INTRAVENOUS; SUBCUTANEOUS EVERY 8 HOURS
Status: DISCONTINUED | OUTPATIENT
Start: 2018-01-01 | End: 2018-01-01 | Stop reason: HOSPADM

## 2018-01-01 RX ORDER — IBUPROFEN 200 MG
1 TABLET ORAL DAILY
Qty: 21 PATCH | Refills: 3 | Status: ON HOLD | COMMUNITY
Start: 2018-01-01 | End: 2018-01-01 | Stop reason: HOSPADM

## 2018-01-01 RX ORDER — LISINOPRIL 40 MG/1
40 TABLET ORAL DAILY
Qty: 90 TABLET | Refills: 0 | Status: ON HOLD | OUTPATIENT
Start: 2018-01-01 | End: 2018-01-01 | Stop reason: HOSPADM

## 2018-01-01 RX ORDER — CHLORHEXIDINE GLUCONATE ORAL RINSE 1.2 MG/ML
15 SOLUTION DENTAL 2 TIMES DAILY
Status: DISCONTINUED | OUTPATIENT
Start: 2018-01-01 | End: 2018-01-01

## 2018-01-01 RX ORDER — ALLOPURINOL 300 MG/1
300 TABLET ORAL DAILY
Qty: 14 TABLET | Refills: 0 | Status: SHIPPED | OUTPATIENT
Start: 2018-01-01 | End: 2018-01-01

## 2018-01-01 RX ORDER — ACETAMINOPHEN 325 MG/1
650 TABLET ORAL
Status: CANCELLED | OUTPATIENT
Start: 2018-01-01

## 2018-01-01 RX ORDER — HYDROCODONE BITARTRATE AND ACETAMINOPHEN 500; 5 MG/1; MG/1
TABLET ORAL
Status: DISCONTINUED | OUTPATIENT
Start: 2018-01-01 | End: 2018-01-01

## 2018-01-01 RX ORDER — ATORVASTATIN CALCIUM 10 MG/1
40 TABLET, FILM COATED ORAL DAILY
Status: DISCONTINUED | OUTPATIENT
Start: 2018-01-01 | End: 2018-01-01

## 2018-01-01 RX ORDER — ONDANSETRON 4 MG/1
4 TABLET, ORALLY DISINTEGRATING ORAL EVERY 6 HOURS PRN
Status: DISCONTINUED | OUTPATIENT
Start: 2018-01-01 | End: 2018-01-01 | Stop reason: HOSPADM

## 2018-01-01 RX ORDER — DIPHENHYDRAMINE HCL 25 MG
25 CAPSULE ORAL ONCE
Status: COMPLETED | OUTPATIENT
Start: 2018-01-01 | End: 2018-01-01

## 2018-01-01 RX ORDER — AMLODIPINE BESYLATE 10 MG/1
10 TABLET ORAL DAILY
Status: DISCONTINUED | OUTPATIENT
Start: 2018-01-01 | End: 2018-01-01

## 2018-01-01 RX ORDER — CEFTRIAXONE 1 G/1
1 INJECTION, POWDER, FOR SOLUTION INTRAMUSCULAR; INTRAVENOUS
Status: COMPLETED | OUTPATIENT
Start: 2018-01-01 | End: 2018-01-01

## 2018-01-01 RX ORDER — ALBUTEROL SULFATE 90 UG/1
1-2 AEROSOL, METERED RESPIRATORY (INHALATION) EVERY 4 HOURS PRN
Qty: 18 G | Refills: 0 | Status: SHIPPED | OUTPATIENT
Start: 2018-01-01 | End: 2018-01-01 | Stop reason: SDUPTHER

## 2018-01-01 RX ORDER — HYDROMORPHONE HYDROCHLORIDE 2 MG/ML
INJECTION, SOLUTION INTRAMUSCULAR; INTRAVENOUS; SUBCUTANEOUS
Status: COMPLETED
Start: 2018-01-01 | End: 2018-01-01

## 2018-01-01 RX ORDER — OXYCODONE HYDROCHLORIDE 5 MG/1
5 TABLET ORAL EVERY 4 HOURS PRN
Qty: 30 TABLET | Refills: 0 | Status: SHIPPED | OUTPATIENT
Start: 2018-01-01 | End: 2018-10-12 | Stop reason: HOSPADM

## 2018-01-01 RX ORDER — HYDROMORPHONE HYDROCHLORIDE 2 MG/ML
0.5 INJECTION, SOLUTION INTRAMUSCULAR; INTRAVENOUS; SUBCUTANEOUS ONCE
Status: COMPLETED | OUTPATIENT
Start: 2018-01-01 | End: 2018-01-01

## 2018-01-01 RX ORDER — FENTANYL CITRATE-0.9 % NACL/PF 10 MCG/ML
25 PLASTIC BAG, INJECTION (ML) INTRAVENOUS CONTINUOUS
Status: DISCONTINUED | OUTPATIENT
Start: 2018-01-01 | End: 2018-10-12 | Stop reason: HOSPADM

## 2018-01-01 RX ORDER — MAGNESIUM SULFATE HEPTAHYDRATE 40 MG/ML
2 INJECTION, SOLUTION INTRAVENOUS ONCE
Status: COMPLETED | OUTPATIENT
Start: 2018-01-01 | End: 2018-01-01

## 2018-01-01 RX ORDER — FENTANYL CITRATE 50 UG/ML
50 INJECTION, SOLUTION INTRAMUSCULAR; INTRAVENOUS
Status: DISCONTINUED | OUTPATIENT
Start: 2018-01-01 | End: 2018-01-01 | Stop reason: HOSPADM

## 2018-01-01 RX ORDER — CLINDAMYCIN HYDROCHLORIDE 300 MG/1
300 CAPSULE ORAL EVERY 6 HOURS
Qty: 28 CAPSULE | Refills: 0 | Status: SHIPPED | OUTPATIENT
Start: 2018-01-01 | End: 2018-10-12 | Stop reason: HOSPADM

## 2018-01-01 RX ORDER — ATORVASTATIN CALCIUM 40 MG/1
40 TABLET, FILM COATED ORAL DAILY
COMMUNITY
End: 2018-01-01 | Stop reason: SDUPTHER

## 2018-01-01 RX ORDER — METHYLPREDNISOLONE SOD SUCC 125 MG
125 VIAL (EA) INJECTION
Status: COMPLETED | OUTPATIENT
Start: 2018-01-01 | End: 2018-01-01

## 2018-01-01 RX ORDER — CARVEDILOL 12.5 MG/1
12.5 TABLET ORAL 2 TIMES DAILY WITH MEALS
Status: DISCONTINUED | OUTPATIENT
Start: 2018-01-01 | End: 2018-01-01 | Stop reason: HOSPADM

## 2018-01-01 RX ORDER — ALBUTEROL SULFATE 0.83 MG/ML
2.5 SOLUTION RESPIRATORY (INHALATION)
Status: COMPLETED | OUTPATIENT
Start: 2018-01-01 | End: 2018-01-01

## 2018-01-01 RX ORDER — EPINEPHRINE 0.3 MG/.3ML
0.3 INJECTION SUBCUTANEOUS
Status: DISCONTINUED | OUTPATIENT
Start: 2018-01-01 | End: 2018-01-01

## 2018-01-01 RX ORDER — ONDANSETRON 8 MG/1
8 TABLET, ORALLY DISINTEGRATING ORAL EVERY 8 HOURS PRN
Status: DISCONTINUED | OUTPATIENT
Start: 2018-01-01 | End: 2018-01-01 | Stop reason: HOSPADM

## 2018-01-01 RX ORDER — DIPHENHYDRAMINE HCL 25 MG
25 CAPSULE ORAL
Status: CANCELLED | OUTPATIENT
Start: 2018-01-01

## 2018-01-01 RX ORDER — LIDOCAINE HYDROCHLORIDE 20 MG/ML
10 INJECTION, SOLUTION EPIDURAL; INFILTRATION; INTRACAUDAL; PERINEURAL ONCE
Status: COMPLETED | OUTPATIENT
Start: 2018-01-01 | End: 2018-01-01

## 2018-01-01 RX ORDER — METRONIDAZOLE 500 MG/100ML
500 INJECTION, SOLUTION INTRAVENOUS
Status: DISCONTINUED | OUTPATIENT
Start: 2018-01-01 | End: 2018-01-01

## 2018-01-01 RX ORDER — MIDAZOLAM HYDROCHLORIDE 1 MG/ML
2 INJECTION INTRAMUSCULAR; INTRAVENOUS ONCE
Status: COMPLETED | OUTPATIENT
Start: 2018-01-01 | End: 2018-01-01

## 2018-01-01 RX ORDER — CARVEDILOL 25 MG/1
25 TABLET ORAL 2 TIMES DAILY WITH MEALS
Status: DISCONTINUED | OUTPATIENT
Start: 2018-01-01 | End: 2018-01-01

## 2018-01-01 RX ORDER — FENTANYL CITRATE 50 UG/ML
50 INJECTION, SOLUTION INTRAMUSCULAR; INTRAVENOUS
Status: DISCONTINUED | OUTPATIENT
Start: 2018-01-01 | End: 2018-01-01

## 2018-01-01 RX ORDER — LAMIVUDINE 100 MG/1
100 TABLET, FILM COATED ORAL DAILY
Qty: 90 TABLET | Refills: 0 | Status: SHIPPED | OUTPATIENT
Start: 2018-01-01 | End: 2018-01-01

## 2018-01-01 RX ORDER — FAMOTIDINE 10 MG/ML
20 INJECTION INTRAVENOUS
Status: COMPLETED | OUTPATIENT
Start: 2018-01-01 | End: 2018-01-01

## 2018-01-01 RX ORDER — IPRATROPIUM BROMIDE AND ALBUTEROL SULFATE 2.5; .5 MG/3ML; MG/3ML
3 SOLUTION RESPIRATORY (INHALATION)
Status: DISCONTINUED | OUTPATIENT
Start: 2018-01-01 | End: 2018-01-01

## 2018-01-01 RX ORDER — DIPHENHYDRAMINE HYDROCHLORIDE 50 MG/ML
25 INJECTION INTRAMUSCULAR; INTRAVENOUS
Status: DISCONTINUED | OUTPATIENT
Start: 2018-01-01 | End: 2018-01-01

## 2018-01-01 RX ORDER — GLYCOPYRROLATE 0.2 MG/ML
0.3 INJECTION INTRAMUSCULAR; INTRAVENOUS EVERY 4 HOURS PRN
Status: DISCONTINUED | OUTPATIENT
Start: 2018-01-01 | End: 2018-10-12 | Stop reason: HOSPADM

## 2018-01-01 RX ORDER — SORAFENIB 200 MG/1
400 TABLET, FILM COATED ORAL 2 TIMES DAILY
Qty: 120 TABLET | Refills: 2 | Status: SHIPPED | OUTPATIENT
Start: 2018-01-01 | End: 2018-10-12 | Stop reason: HOSPADM

## 2018-01-01 RX ORDER — TRAZODONE HYDROCHLORIDE 50 MG/1
50 TABLET ORAL NIGHTLY
COMMUNITY
End: 2018-01-01

## 2018-01-01 RX ORDER — IBUPROFEN 200 MG
1 TABLET ORAL DAILY
Status: DISCONTINUED | OUTPATIENT
Start: 2018-01-01 | End: 2018-01-01 | Stop reason: HOSPADM

## 2018-01-01 RX ORDER — HYDROCHLOROTHIAZIDE 25 MG/1
25 TABLET ORAL DAILY
COMMUNITY
End: 2018-01-01 | Stop reason: SDUPTHER

## 2018-01-01 RX ORDER — LORAZEPAM 1 MG/1
2 TABLET ORAL
Status: DISCONTINUED | OUTPATIENT
Start: 2018-01-01 | End: 2018-01-01

## 2018-01-01 RX ORDER — DIPHENHYDRAMINE HYDROCHLORIDE 50 MG/ML
50 INJECTION INTRAMUSCULAR; INTRAVENOUS
Status: COMPLETED | OUTPATIENT
Start: 2018-01-01 | End: 2018-01-01

## 2018-01-01 RX ORDER — FUROSEMIDE 10 MG/ML
40 INJECTION INTRAMUSCULAR; INTRAVENOUS ONCE
Status: COMPLETED | OUTPATIENT
Start: 2018-01-01 | End: 2018-01-01

## 2018-01-01 RX ORDER — SODIUM CHLORIDE 0.9 % (FLUSH) 0.9 %
10 SYRINGE (ML) INJECTION
Status: CANCELLED | OUTPATIENT
Start: 2018-01-01

## 2018-01-01 RX ORDER — PHENYLEPHRINE HCL IN 0.9% NACL 1 MG/10 ML
SYRINGE (ML) INTRAVENOUS
Status: DISPENSED
Start: 2018-01-01 | End: 2018-01-01

## 2018-01-01 RX ORDER — VANCOMYCIN HCL IN 5 % DEXTROSE 1G/250ML
1000 PLASTIC BAG, INJECTION (ML) INTRAVENOUS
Status: DISCONTINUED | OUTPATIENT
Start: 2018-01-01 | End: 2018-01-01

## 2018-01-01 RX ORDER — FLUCONAZOLE 200 MG/1
400 TABLET ORAL DAILY
Qty: 28 TABLET | Refills: 0 | Status: SHIPPED | OUTPATIENT
Start: 2018-01-01 | End: 2018-01-01

## 2018-01-01 RX ORDER — CARVEDILOL 12.5 MG/1
25 TABLET ORAL 2 TIMES DAILY WITH MEALS
Status: DISCONTINUED | OUTPATIENT
Start: 2018-01-01 | End: 2018-01-01

## 2018-01-01 RX ORDER — IPRATROPIUM BROMIDE AND ALBUTEROL SULFATE 2.5; .5 MG/3ML; MG/3ML
3 SOLUTION RESPIRATORY (INHALATION)
Status: COMPLETED | OUTPATIENT
Start: 2018-01-01 | End: 2018-01-01

## 2018-01-01 RX ORDER — FENTANYL CITRATE 50 UG/ML
INJECTION, SOLUTION INTRAMUSCULAR; INTRAVENOUS
Status: COMPLETED
Start: 2018-01-01 | End: 2018-01-01

## 2018-01-01 RX ORDER — NITROGLYCERIN 0.4 MG/1
0.4 TABLET SUBLINGUAL EVERY 5 MIN PRN
Qty: 30 TABLET | Refills: 3 | Status: SHIPPED | OUTPATIENT
Start: 2018-01-01 | End: 2018-10-12 | Stop reason: HOSPADM

## 2018-01-01 RX ORDER — NAPROXEN SODIUM 220 MG/1
81 TABLET, FILM COATED ORAL DAILY
Status: DISCONTINUED | OUTPATIENT
Start: 2018-01-01 | End: 2018-01-01

## 2018-01-01 RX ORDER — CEFEPIME HYDROCHLORIDE 1 G/50ML
1 INJECTION, SOLUTION INTRAVENOUS ONCE
Status: DISCONTINUED | OUTPATIENT
Start: 2018-01-01 | End: 2018-01-01 | Stop reason: SDUPTHER

## 2018-01-01 RX ORDER — PREDNISONE 20 MG/1
20 TABLET ORAL 2 TIMES DAILY
Qty: 8 TABLET | Refills: 0 | Status: SHIPPED | OUTPATIENT
Start: 2018-01-01 | End: 2018-10-12 | Stop reason: HOSPADM

## 2018-01-01 RX ORDER — IBUPROFEN 200 MG
1 TABLET ORAL DAILY
Qty: 10 PATCH | Refills: 0 | Status: SHIPPED | OUTPATIENT
Start: 2018-01-01 | End: 2018-01-01

## 2018-01-01 RX ORDER — AZITHROMYCIN 250 MG/1
500 TABLET, FILM COATED ORAL DAILY
Status: DISCONTINUED | OUTPATIENT
Start: 2018-01-01 | End: 2018-01-01

## 2018-01-01 RX ORDER — ALBUTEROL SULFATE 90 UG/1
2 AEROSOL, METERED RESPIRATORY (INHALATION) EVERY 4 HOURS PRN
Status: DISCONTINUED | OUTPATIENT
Start: 2018-01-01 | End: 2018-01-01 | Stop reason: HOSPADM

## 2018-01-01 RX ORDER — LAMIVUDINE 100 MG/1
100 TABLET, FILM COATED ORAL DAILY
Status: DISCONTINUED | OUTPATIENT
Start: 2018-01-01 | End: 2018-01-01 | Stop reason: HOSPADM

## 2018-01-01 RX ORDER — ACETAMINOPHEN 325 MG/1
650 TABLET ORAL ONCE
Status: COMPLETED | OUTPATIENT
Start: 2018-01-01 | End: 2018-01-01

## 2018-01-01 RX ORDER — AMLODIPINE BESYLATE 10 MG/1
10 TABLET ORAL DAILY
Qty: 90 TABLET | Refills: 0 | Status: SHIPPED | OUTPATIENT
Start: 2018-01-01 | End: 2018-10-12 | Stop reason: HOSPADM

## 2018-01-01 RX ORDER — NAPROXEN SODIUM 220 MG/1
81 TABLET, FILM COATED ORAL DAILY
Refills: 0 | Status: ON HOLD | COMMUNITY
Start: 2018-01-01 | End: 2018-01-01 | Stop reason: HOSPADM

## 2018-01-01 RX ORDER — LISINOPRIL 20 MG/1
40 TABLET ORAL DAILY
Status: ON HOLD | COMMUNITY
End: 2018-01-01 | Stop reason: HOSPADM

## 2018-01-01 RX ORDER — IPRATROPIUM BROMIDE AND ALBUTEROL SULFATE 2.5; .5 MG/3ML; MG/3ML
3 SOLUTION RESPIRATORY (INHALATION) EVERY 4 HOURS
Status: DISCONTINUED | OUTPATIENT
Start: 2018-01-01 | End: 2018-01-01

## 2018-01-01 RX ORDER — FLUCONAZOLE 200 MG/1
400 TABLET ORAL DAILY
Status: DISCONTINUED | OUTPATIENT
Start: 2018-01-01 | End: 2018-01-01 | Stop reason: HOSPADM

## 2018-01-01 RX ORDER — MIDAZOLAM HYDROCHLORIDE 1 MG/ML
INJECTION INTRAMUSCULAR; INTRAVENOUS
Status: COMPLETED
Start: 2018-01-01 | End: 2018-01-01

## 2018-01-01 RX ORDER — ACETAMINOPHEN 325 MG/1
650 TABLET ORAL 2 TIMES DAILY PRN
Status: DISCONTINUED | OUTPATIENT
Start: 2018-01-01 | End: 2018-01-01 | Stop reason: HOSPADM

## 2018-01-01 RX ORDER — IBUPROFEN 200 MG
1 TABLET ORAL DAILY
Qty: 14 PATCH | Refills: 0 | Status: SHIPPED | OUTPATIENT
Start: 2018-01-01 | End: 2018-01-01

## 2018-01-01 RX ORDER — ACETAMINOPHEN 325 MG/1
650 TABLET ORAL
Status: COMPLETED | OUTPATIENT
Start: 2018-01-01 | End: 2018-01-01

## 2018-01-01 RX ORDER — SODIUM CHLORIDE 0.9 % (FLUSH) 0.9 %
10 SYRINGE (ML) INJECTION EVERY 6 HOURS
Status: DISCONTINUED | OUTPATIENT
Start: 2018-01-01 | End: 2018-01-01 | Stop reason: HOSPADM

## 2018-01-01 RX ORDER — HYDROCODONE BITARTRATE AND ACETAMINOPHEN 500; 5 MG/1; MG/1
TABLET ORAL ONCE
Status: COMPLETED | OUTPATIENT
Start: 2018-01-01 | End: 2018-01-01

## 2018-01-01 RX ORDER — FLUTICASONE FUROATE AND VILANTEROL 100; 25 UG/1; UG/1
1 POWDER RESPIRATORY (INHALATION) DAILY
Status: DISCONTINUED | OUTPATIENT
Start: 2018-01-01 | End: 2018-01-01 | Stop reason: HOSPADM

## 2018-01-01 RX ORDER — FAMOTIDINE 20 MG/1
20 TABLET, FILM COATED ORAL DAILY
Status: DISCONTINUED | OUTPATIENT
Start: 2018-01-01 | End: 2018-01-01

## 2018-01-01 RX ORDER — IPRATROPIUM BROMIDE AND ALBUTEROL SULFATE 2.5; .5 MG/3ML; MG/3ML
3 SOLUTION RESPIRATORY (INHALATION) EVERY 4 HOURS PRN
Status: DISCONTINUED | OUTPATIENT
Start: 2018-01-01 | End: 2018-01-01 | Stop reason: HOSPADM

## 2018-01-01 RX ORDER — AMLODIPINE BESYLATE 10 MG/1
10 TABLET ORAL DAILY
Qty: 90 TABLET | Refills: 0 | Status: ON HOLD | OUTPATIENT
Start: 2018-01-01 | End: 2018-01-01 | Stop reason: SDUPTHER

## 2018-01-01 RX ORDER — IBUPROFEN 200 MG
400 TABLET ORAL EVERY 6 HOURS PRN
Status: DISCONTINUED | OUTPATIENT
Start: 2018-01-01 | End: 2018-01-01

## 2018-01-01 RX ORDER — AMOXICILLIN AND CLAVULANATE POTASSIUM 875; 125 MG/1; MG/1
1 TABLET, FILM COATED ORAL 2 TIMES DAILY
Qty: 14 TABLET | Refills: 0 | Status: ON HOLD | OUTPATIENT
Start: 2018-01-01 | End: 2018-01-01 | Stop reason: HOSPADM

## 2018-01-01 RX ORDER — GLUCAGON 1 MG
1 KIT INJECTION
Status: DISCONTINUED | OUTPATIENT
Start: 2018-01-01 | End: 2018-01-01

## 2018-01-01 RX ORDER — FENTANYL CITRATE 50 UG/ML
100 INJECTION, SOLUTION INTRAMUSCULAR; INTRAVENOUS ONCE
Status: COMPLETED | OUTPATIENT
Start: 2018-01-01 | End: 2018-01-01

## 2018-01-01 RX ORDER — ALBUTEROL SULFATE 90 UG/1
1-2 AEROSOL, METERED RESPIRATORY (INHALATION) EVERY 4 HOURS PRN
Qty: 18 G | Refills: 0 | Status: ON HOLD | OUTPATIENT
Start: 2018-01-01 | End: 2018-01-01 | Stop reason: SDUPTHER

## 2018-01-01 RX ORDER — GUAIFENESIN 600 MG/1
600 TABLET, EXTENDED RELEASE ORAL 2 TIMES DAILY
Status: DISCONTINUED | OUTPATIENT
Start: 2018-01-01 | End: 2018-01-01 | Stop reason: HOSPADM

## 2018-01-01 RX ORDER — HYDROCODONE BITARTRATE AND ACETAMINOPHEN 5; 325 MG/1; MG/1
1 TABLET ORAL EVERY 6 HOURS PRN
Status: DISCONTINUED | OUTPATIENT
Start: 2018-01-01 | End: 2018-01-01 | Stop reason: HOSPADM

## 2018-01-01 RX ORDER — ONDANSETRON 2 MG/ML
4 INJECTION INTRAMUSCULAR; INTRAVENOUS EVERY 12 HOURS PRN
Status: DISCONTINUED | OUTPATIENT
Start: 2018-01-01 | End: 2018-01-01 | Stop reason: HOSPADM

## 2018-01-01 RX ORDER — FLUTICASONE FUROATE AND VILANTEROL 100; 25 UG/1; UG/1
1 POWDER RESPIRATORY (INHALATION) DAILY
Qty: 14 EACH | Refills: 2 | Status: ON HOLD | OUTPATIENT
Start: 2018-01-01 | End: 2018-01-01

## 2018-01-01 RX ORDER — ALLOPURINOL 300 MG/1
300 TABLET ORAL DAILY
Status: DISCONTINUED | OUTPATIENT
Start: 2018-01-01 | End: 2018-01-01 | Stop reason: HOSPADM

## 2018-01-01 RX ORDER — POTASSIUM CHLORIDE 20 MEQ/1
40 TABLET, EXTENDED RELEASE ORAL
Status: COMPLETED | OUTPATIENT
Start: 2018-01-01 | End: 2018-01-01

## 2018-01-01 RX ORDER — LEVOFLOXACIN 750 MG/1
750 TABLET ORAL DAILY
Qty: 5 TABLET | Refills: 0 | Status: SHIPPED | OUTPATIENT
Start: 2018-01-01 | End: 2018-01-01

## 2018-01-01 RX ORDER — LISINOPRIL 40 MG/1
40 TABLET ORAL DAILY
COMMUNITY
End: 2018-01-01 | Stop reason: SDUPTHER

## 2018-01-01 RX ORDER — FLUCONAZOLE 2 MG/ML
400 INJECTION, SOLUTION INTRAVENOUS
Status: DISCONTINUED | OUTPATIENT
Start: 2018-01-01 | End: 2018-01-01

## 2018-01-01 RX ORDER — SODIUM CHLORIDE 9 MG/ML
INJECTION, SOLUTION INTRAVENOUS CONTINUOUS
Status: DISCONTINUED | OUTPATIENT
Start: 2018-01-01 | End: 2018-01-01

## 2018-01-01 RX ORDER — DIPHENHYDRAMINE HCL 25 MG
25 CAPSULE ORAL ONCE AS NEEDED
Status: COMPLETED | OUTPATIENT
Start: 2018-01-01 | End: 2018-01-01

## 2018-01-01 RX ORDER — LORAZEPAM 2 MG/ML
2 INJECTION INTRAMUSCULAR
Status: DISCONTINUED | OUTPATIENT
Start: 2018-01-01 | End: 2018-10-12 | Stop reason: HOSPADM

## 2018-01-01 RX ORDER — GUAIFENESIN 600 MG/1
600 TABLET, EXTENDED RELEASE ORAL 2 TIMES DAILY
Qty: 20 TABLET | Refills: 0 | COMMUNITY
Start: 2018-01-01 | End: 2018-01-01

## 2018-01-01 RX ORDER — CEFEPIME HYDROCHLORIDE 2 G/1
2 INJECTION, POWDER, FOR SOLUTION INTRAVENOUS
Status: COMPLETED | OUTPATIENT
Start: 2018-01-01 | End: 2018-01-01

## 2018-01-01 RX ORDER — ACYCLOVIR 200 MG/1
400 CAPSULE ORAL 2 TIMES DAILY
Qty: 360 CAPSULE | Refills: 3 | Status: CANCELLED | OUTPATIENT
Start: 2018-01-01 | End: 2019-09-19

## 2018-01-01 RX ORDER — RAMELTEON 8 MG/1
8 TABLET ORAL NIGHTLY PRN
Status: DISCONTINUED | OUTPATIENT
Start: 2018-01-01 | End: 2018-01-01 | Stop reason: HOSPADM

## 2018-01-01 RX ORDER — ACYCLOVIR 200 MG/1
400 CAPSULE ORAL 2 TIMES DAILY
Status: DISCONTINUED | OUTPATIENT
Start: 2018-01-01 | End: 2018-01-01 | Stop reason: HOSPADM

## 2018-01-01 RX ORDER — GLYCOPYRROLATE 0.2 MG/ML
0.1 INJECTION INTRAMUSCULAR; INTRAVENOUS ONCE
Status: DISCONTINUED | OUTPATIENT
Start: 2018-01-01 | End: 2018-01-01

## 2018-01-01 RX ORDER — GLYCOPYRROLATE 0.2 MG/ML
0.1 INJECTION INTRAMUSCULAR; INTRAVENOUS EVERY 4 HOURS PRN
Status: DISCONTINUED | OUTPATIENT
Start: 2018-01-01 | End: 2018-01-01

## 2018-01-01 RX ORDER — SODIUM CHLORIDE 0.9 % (FLUSH) 0.9 %
3 SYRINGE (ML) INJECTION EVERY 8 HOURS
Status: DISCONTINUED | OUTPATIENT
Start: 2018-01-01 | End: 2018-10-12 | Stop reason: HOSPADM

## 2018-01-01 RX ORDER — NOREPINEPHRINE BITARTRATE 1 MG/ML
INJECTION, SOLUTION INTRAVENOUS
Status: DISPENSED
Start: 2018-01-01 | End: 2018-01-01

## 2018-01-01 RX ORDER — FLUDROCORTISONE ACETATE 0.1 MG/1
100 TABLET ORAL DAILY
Status: DISCONTINUED | OUTPATIENT
Start: 2018-01-01 | End: 2018-01-01

## 2018-01-01 RX ORDER — FENTANYL CITRATE 50 UG/ML
50 INJECTION, SOLUTION INTRAMUSCULAR; INTRAVENOUS
Status: DISPENSED | OUTPATIENT
Start: 2018-01-01 | End: 2018-01-01

## 2018-01-01 RX ORDER — DEXMEDETOMIDINE HYDROCHLORIDE 4 UG/ML
0.2 INJECTION, SOLUTION INTRAVENOUS CONTINUOUS
Status: DISCONTINUED | OUTPATIENT
Start: 2018-01-01 | End: 2018-10-12 | Stop reason: HOSPADM

## 2018-01-01 RX ORDER — SODIUM CHLORIDE 0.9 % (FLUSH) 0.9 %
3 SYRINGE (ML) INJECTION
Status: DISCONTINUED | OUTPATIENT
Start: 2018-01-01 | End: 2018-01-01 | Stop reason: HOSPADM

## 2018-01-01 RX ORDER — CARVEDILOL 25 MG/1
25 TABLET ORAL 2 TIMES DAILY WITH MEALS
COMMUNITY
End: 2018-01-01 | Stop reason: SDUPTHER

## 2018-01-01 RX ORDER — DIPHENHYDRAMINE HCL 25 MG
25 CAPSULE ORAL EVERY 8 HOURS
Refills: 0 | COMMUNITY
Start: 2018-01-01 | End: 2018-01-01

## 2018-01-01 RX ORDER — SODIUM CHLORIDE 0.9 % (FLUSH) 0.9 %
10 SYRINGE (ML) INJECTION
Status: DISCONTINUED | OUTPATIENT
Start: 2018-01-01 | End: 2018-01-01 | Stop reason: HOSPADM

## 2018-01-01 RX ORDER — HYDROCHLOROTHIAZIDE 25 MG/1
25 TABLET ORAL DAILY
Status: DISCONTINUED | OUTPATIENT
Start: 2018-01-01 | End: 2018-01-01

## 2018-01-01 RX ORDER — CEFEPIME HYDROCHLORIDE 1 G/50ML
1 INJECTION, SOLUTION INTRAVENOUS
Status: DISCONTINUED | OUTPATIENT
Start: 2018-01-01 | End: 2018-01-01 | Stop reason: HOSPADM

## 2018-01-01 RX ORDER — LIDOCAINE HYDROCHLORIDE 10 MG/ML
5 INJECTION INFILTRATION; PERINEURAL ONCE
Status: COMPLETED | OUTPATIENT
Start: 2018-01-01 | End: 2018-01-01

## 2018-01-01 RX ORDER — ETOMIDATE 2 MG/ML
8 INJECTION INTRAVENOUS ONCE
Status: DISCONTINUED | OUTPATIENT
Start: 2018-01-01 | End: 2018-01-01

## 2018-01-01 RX ORDER — ACYCLOVIR 400 MG/1
400 TABLET ORAL 2 TIMES DAILY
Qty: 180 TABLET | Refills: 3 | Status: SHIPPED | OUTPATIENT
Start: 2018-01-01 | End: 2018-10-12 | Stop reason: HOSPADM

## 2018-01-01 RX ORDER — CIPROFLOXACIN 250 MG/1
500 TABLET, FILM COATED ORAL EVERY 12 HOURS
Status: DISCONTINUED | OUTPATIENT
Start: 2018-01-01 | End: 2018-01-01 | Stop reason: HOSPADM

## 2018-01-01 RX ORDER — PANTOPRAZOLE SODIUM 40 MG/10ML
40 INJECTION, POWDER, LYOPHILIZED, FOR SOLUTION INTRAVENOUS DAILY
Status: DISCONTINUED | OUTPATIENT
Start: 2018-01-01 | End: 2018-01-01

## 2018-01-01 RX ORDER — PREDNISONE 20 MG/1
40 TABLET ORAL DAILY
Status: DISCONTINUED | OUTPATIENT
Start: 2018-01-01 | End: 2018-01-01 | Stop reason: HOSPADM

## 2018-01-01 RX ORDER — SULFAMETHOXAZOLE AND TRIMETHOPRIM 800; 160 MG/1; MG/1
1 TABLET ORAL 2 TIMES DAILY
Qty: 20 TABLET | Refills: 0 | Status: ON HOLD | OUTPATIENT
Start: 2018-01-01 | End: 2018-01-01 | Stop reason: HOSPADM

## 2018-01-01 RX ORDER — DIPHENHYDRAMINE HYDROCHLORIDE 50 MG/ML
25 INJECTION INTRAMUSCULAR; INTRAVENOUS EVERY 6 HOURS
Status: DISCONTINUED | OUTPATIENT
Start: 2018-01-01 | End: 2018-01-01 | Stop reason: HOSPADM

## 2018-01-01 RX ORDER — DIPHENHYDRAMINE HCL 25 MG
25 CAPSULE ORAL 2 TIMES DAILY PRN
Status: DISCONTINUED | OUTPATIENT
Start: 2018-01-01 | End: 2018-01-01 | Stop reason: HOSPADM

## 2018-01-01 RX ORDER — CARVEDILOL 25 MG/1
12.5 TABLET ORAL 2 TIMES DAILY WITH MEALS
Qty: 180 TABLET | Refills: 0 | Status: SHIPPED | OUTPATIENT
Start: 2018-01-01 | End: 2018-01-01 | Stop reason: SDUPTHER

## 2018-01-01 RX ORDER — CARVEDILOL 12.5 MG/1
12.5 TABLET ORAL 2 TIMES DAILY WITH MEALS
Qty: 180 TABLET | Refills: 0 | Status: SHIPPED | OUTPATIENT
Start: 2018-01-01 | End: 2018-01-01 | Stop reason: SDUPTHER

## 2018-01-01 RX ADMIN — HEPARIN SODIUM 5000 UNITS: 5000 INJECTION, SOLUTION INTRAVENOUS; SUBCUTANEOUS at 05:05

## 2018-01-01 RX ADMIN — LIDOCAINE HYDROCHLORIDE 5 ML: 10 INJECTION, SOLUTION INFILTRATION; PERINEURAL at 10:10

## 2018-01-01 RX ADMIN — FLUTICASONE FUROATE AND VILANTEROL TRIFENATATE 1 PUFF: 100; 25 POWDER RESPIRATORY (INHALATION) at 08:09

## 2018-01-01 RX ADMIN — IPRATROPIUM BROMIDE AND ALBUTEROL SULFATE 3 ML: .5; 3 SOLUTION RESPIRATORY (INHALATION) at 09:10

## 2018-01-01 RX ADMIN — CEFEPIME 2 G: 2 INJECTION, POWDER, FOR SOLUTION INTRAVENOUS at 09:09

## 2018-01-01 RX ADMIN — CIPROFLOXACIN HYDROCHLORIDE 500 MG: 250 TABLET, FILM COATED ORAL at 08:09

## 2018-01-01 RX ADMIN — NICOTINE 1 PATCH: 21 PATCH, EXTENDED RELEASE TRANSDERMAL at 08:05

## 2018-01-01 RX ADMIN — SODIUM CHLORIDE: 0.9 INJECTION, SOLUTION INTRAVENOUS at 04:09

## 2018-01-01 RX ADMIN — LAMIVUDINE 100 MG: 100 TABLET, FILM COATED ORAL at 09:10

## 2018-01-01 RX ADMIN — SODIUM CHLORIDE 2463 ML: 0.9 INJECTION, SOLUTION INTRAVENOUS at 07:10

## 2018-01-01 RX ADMIN — IPRATROPIUM BROMIDE AND ALBUTEROL SULFATE 3 ML: .5; 3 SOLUTION RESPIRATORY (INHALATION) at 09:09

## 2018-01-01 RX ADMIN — METRONIDAZOLE 500 MG: 500 INJECTION, SOLUTION INTRAVENOUS at 08:10

## 2018-01-01 RX ADMIN — SODIUM CHLORIDE, PRESERVATIVE FREE 10 ML: 5 INJECTION INTRAVENOUS at 10:09

## 2018-01-01 RX ADMIN — Medication 5 ML: at 11:09

## 2018-01-01 RX ADMIN — HYDROCORTISONE SODIUM SUCCINATE 100 MG: 100 INJECTION, POWDER, FOR SOLUTION INTRAMUSCULAR; INTRAVENOUS at 05:10

## 2018-01-01 RX ADMIN — IPRATROPIUM BROMIDE AND ALBUTEROL SULFATE 3 ML: .5; 3 SOLUTION RESPIRATORY (INHALATION) at 11:05

## 2018-01-01 RX ADMIN — ALLOPURINOL 300 MG: 300 TABLET ORAL at 10:09

## 2018-01-01 RX ADMIN — ACETAMINOPHEN 650 MG: 325 TABLET ORAL at 02:09

## 2018-01-01 RX ADMIN — METRONIDAZOLE 500 MG: 500 INJECTION, SOLUTION INTRAVENOUS at 10:10

## 2018-01-01 RX ADMIN — IPRATROPIUM BROMIDE AND ALBUTEROL SULFATE 3 ML: .5; 3 SOLUTION RESPIRATORY (INHALATION) at 12:05

## 2018-01-01 RX ADMIN — Medication 10 ML: at 02:09

## 2018-01-01 RX ADMIN — NICOTINE 1 PATCH: 14 PATCH, EXTENDED RELEASE TRANSDERMAL at 09:09

## 2018-01-01 RX ADMIN — NOREPINEPHRINE BITARTRATE 2 MCG/KG/MIN: 1 INJECTION, SOLUTION, CONCENTRATE INTRAVENOUS at 05:10

## 2018-01-01 RX ADMIN — NOREPINEPHRINE BITARTRATE 3 MCG/KG/MIN: 1 INJECTION, SOLUTION, CONCENTRATE INTRAVENOUS at 08:10

## 2018-01-01 RX ADMIN — Medication 10 ML: at 06:09

## 2018-01-01 RX ADMIN — FLUTICASONE FUROATE AND VILANTEROL TRIFENATATE 1 PUFF: 100; 25 POWDER RESPIRATORY (INHALATION) at 10:05

## 2018-01-01 RX ADMIN — ACYCLOVIR 400 MG: 200 CAPSULE ORAL at 09:09

## 2018-01-01 RX ADMIN — PREDNISONE 40 MG: 20 TABLET ORAL at 09:05

## 2018-01-01 RX ADMIN — ALBUTEROL SULFATE 2 PUFF: 90 AEROSOL, METERED RESPIRATORY (INHALATION) at 10:10

## 2018-01-01 RX ADMIN — AMLODIPINE BESYLATE 10 MG: 10 TABLET ORAL at 08:05

## 2018-01-01 RX ADMIN — SODIUM POLYSTYRENE SULFONATE 30 G: 15 SUSPENSION ORAL; RECTAL at 10:10

## 2018-01-01 RX ADMIN — HEPARIN SODIUM 5000 UNITS: 5000 INJECTION, SOLUTION INTRAVENOUS; SUBCUTANEOUS at 06:05

## 2018-01-01 RX ADMIN — ACYCLOVIR 400 MG: 200 CAPSULE ORAL at 10:09

## 2018-01-01 RX ADMIN — LAMIVUDINE 100 MG: 100 TABLET, FILM COATED ORAL at 02:10

## 2018-01-01 RX ADMIN — MOXIFLOXACIN HYDROCHLORIDE 400 MG: 400 TABLET, FILM COATED ORAL at 11:05

## 2018-01-01 RX ADMIN — NOREPINEPHRINE BITARTRATE 2 MCG/KG/MIN: 1 INJECTION, SOLUTION, CONCENTRATE INTRAVENOUS at 11:10

## 2018-01-01 RX ADMIN — ACETAMINOPHEN 650 MG: 325 TABLET ORAL at 11:09

## 2018-01-01 RX ADMIN — HEPARIN 500 UNITS: 100 SYRINGE at 10:09

## 2018-01-01 RX ADMIN — MIDAZOLAM HYDROCHLORIDE 2 MG: 1 INJECTION, SOLUTION INTRAMUSCULAR; INTRAVENOUS at 03:10

## 2018-01-01 RX ADMIN — SODIUM CHLORIDE: 9 INJECTION, SOLUTION INTRAVENOUS at 02:09

## 2018-01-01 RX ADMIN — SODIUM POLYSTYRENE SULFONATE 30 G: 15 SUSPENSION ORAL; RECTAL at 04:10

## 2018-01-01 RX ADMIN — EPINEPHRINE 1.4 MCG/KG/MIN: 1 INJECTION INTRAMUSCULAR; INTRAVENOUS; SUBCUTANEOUS at 04:10

## 2018-01-01 RX ADMIN — VANCOMYCIN HYDROCHLORIDE 2000 MG: 10 INJECTION, POWDER, LYOPHILIZED, FOR SOLUTION INTRAVENOUS at 07:10

## 2018-01-01 RX ADMIN — FENTANYL CITRATE 100 MCG: 50 INJECTION, SOLUTION INTRAMUSCULAR; INTRAVENOUS at 03:10

## 2018-01-01 RX ADMIN — Medication 3 ML: at 10:10

## 2018-01-01 RX ADMIN — Medication 3 ML: at 01:10

## 2018-01-01 RX ADMIN — DEXMEDETOMIDINE HYDROCHLORIDE 0.2 MCG/KG/HR: 100 INJECTION, SOLUTION, CONCENTRATE INTRAVENOUS at 06:10

## 2018-01-01 RX ADMIN — VANCOMYCIN HYDROCHLORIDE 1000 MG: 1 INJECTION, POWDER, LYOPHILIZED, FOR SOLUTION INTRAVENOUS at 08:10

## 2018-01-01 RX ADMIN — IPRATROPIUM BROMIDE AND ALBUTEROL SULFATE 3 ML: .5; 3 SOLUTION RESPIRATORY (INHALATION) at 02:05

## 2018-01-01 RX ADMIN — SODIUM CHLORIDE 1000 ML: 0.9 INJECTION, SOLUTION INTRAVENOUS at 11:05

## 2018-01-01 RX ADMIN — CARVEDILOL 12.5 MG: 12.5 TABLET, FILM COATED ORAL at 08:10

## 2018-01-01 RX ADMIN — CEFEPIME 2 G: 2 INJECTION, POWDER, FOR SOLUTION INTRAVENOUS at 02:09

## 2018-01-01 RX ADMIN — GUAIFENESIN 600 MG: 600 TABLET, EXTENDED RELEASE ORAL at 08:05

## 2018-01-01 RX ADMIN — VASOPRESSIN 0.04 UNITS/MIN: 20 INJECTION INTRAVENOUS at 03:10

## 2018-01-01 RX ADMIN — ACYCLOVIR 400 MG: 200 CAPSULE ORAL at 08:09

## 2018-01-01 RX ADMIN — ALLOPURINOL 300 MG: 300 TABLET ORAL at 08:09

## 2018-01-01 RX ADMIN — ALBUTEROL SULFATE 2.5 MG: 2.5 SOLUTION RESPIRATORY (INHALATION) at 03:05

## 2018-01-01 RX ADMIN — GUAIFENESIN 600 MG: 600 TABLET, EXTENDED RELEASE ORAL at 10:05

## 2018-01-01 RX ADMIN — AMLODIPINE BESYLATE 10 MG: 10 TABLET ORAL at 08:10

## 2018-01-01 RX ADMIN — CIPROFLOXACIN HYDROCHLORIDE 500 MG: 250 TABLET, FILM COATED ORAL at 09:09

## 2018-01-01 RX ADMIN — DIPHENHYDRAMINE HYDROCHLORIDE 25 MG: 25 CAPSULE ORAL at 11:09

## 2018-01-01 RX ADMIN — IPRATROPIUM BROMIDE AND ALBUTEROL SULFATE 3 ML: .5; 3 SOLUTION RESPIRATORY (INHALATION) at 04:05

## 2018-01-01 RX ADMIN — ALLOPURINOL 300 MG: 300 TABLET ORAL at 09:09

## 2018-01-01 RX ADMIN — NICOTINE 1 PATCH: 14 PATCH, EXTENDED RELEASE TRANSDERMAL at 10:09

## 2018-01-01 RX ADMIN — HEPARIN SODIUM 5000 UNITS: 5000 INJECTION, SOLUTION INTRAVENOUS; SUBCUTANEOUS at 11:05

## 2018-01-01 RX ADMIN — PANTOPRAZOLE SODIUM 40 MG: 40 INJECTION, POWDER, FOR SOLUTION INTRAVENOUS at 04:10

## 2018-01-01 RX ADMIN — FLUTICASONE FUROATE AND VILANTEROL TRIFENATATE 1 PUFF: 100; 25 POWDER RESPIRATORY (INHALATION) at 08:05

## 2018-01-01 RX ADMIN — FLUCONAZOLE 400 MG: 200 TABLET ORAL at 10:09

## 2018-01-01 RX ADMIN — POTASSIUM CHLORIDE 40 MEQ: 20 TABLET, EXTENDED RELEASE ORAL at 04:05

## 2018-01-01 RX ADMIN — HEPARIN 500 UNITS: 100 SYRINGE at 09:09

## 2018-01-01 RX ADMIN — ACETAMINOPHEN 650 MG: 325 TABLET ORAL at 03:10

## 2018-01-01 RX ADMIN — IPRATROPIUM BROMIDE AND ALBUTEROL SULFATE 3 ML: .5; 3 SOLUTION RESPIRATORY (INHALATION) at 11:10

## 2018-01-01 RX ADMIN — ACYCLOVIR 400 MG: 200 CAPSULE ORAL at 04:09

## 2018-01-01 RX ADMIN — FLUCONAZOLE 400 MG: 200 TABLET ORAL at 09:09

## 2018-01-01 RX ADMIN — HEPARIN SODIUM 5000 UNITS: 5000 INJECTION, SOLUTION INTRAVENOUS; SUBCUTANEOUS at 01:05

## 2018-01-01 RX ADMIN — ACETAMINOPHEN 650 MG: 325 TABLET ORAL at 09:09

## 2018-01-01 RX ADMIN — Medication 3 ML: at 06:10

## 2018-01-01 RX ADMIN — VASOPRESSIN 0.04 UNITS/MIN: 20 INJECTION INTRAVENOUS at 08:10

## 2018-01-01 RX ADMIN — PREDNISONE 40 MG: 20 TABLET ORAL at 08:05

## 2018-01-01 RX ADMIN — IPRATROPIUM BROMIDE AND ALBUTEROL SULFATE 3 ML: .5; 3 SOLUTION RESPIRATORY (INHALATION) at 11:09

## 2018-01-01 RX ADMIN — SODIUM BICARBONATE: 84 INJECTION, SOLUTION INTRAVENOUS at 12:10

## 2018-01-01 RX ADMIN — LORAZEPAM 2 MG: 2 INJECTION INTRAMUSCULAR; INTRAVENOUS at 06:10

## 2018-01-01 RX ADMIN — IPRATROPIUM BROMIDE AND ALBUTEROL SULFATE 3 ML: .5; 3 SOLUTION RESPIRATORY (INHALATION) at 03:09

## 2018-01-01 RX ADMIN — PANTOPRAZOLE SODIUM 40 MG: 40 INJECTION, POWDER, FOR SOLUTION INTRAVENOUS at 08:10

## 2018-01-01 RX ADMIN — FLUCONAZOLE 400 MG: 2 INJECTION INTRAVENOUS at 04:10

## 2018-01-01 RX ADMIN — NOREPINEPHRINE BITARTRATE 3 MCG/KG/MIN: 1 INJECTION, SOLUTION, CONCENTRATE INTRAVENOUS at 07:10

## 2018-01-01 RX ADMIN — CEFEPIME 2 G: 2 INJECTION, POWDER, FOR SOLUTION INTRAVENOUS at 06:10

## 2018-01-01 RX ADMIN — INSULIN HUMAN 8.82 UNITS: 100 INJECTION, SOLUTION PARENTERAL at 04:10

## 2018-01-01 RX ADMIN — NICOTINE 1 PATCH: 14 PATCH, EXTENDED RELEASE TRANSDERMAL at 08:10

## 2018-01-01 RX ADMIN — CEFEPIME 2 G: 2 INJECTION, POWDER, FOR SOLUTION INTRAVENOUS at 04:09

## 2018-01-01 RX ADMIN — Medication 200 MCG/HR: at 12:10

## 2018-01-01 RX ADMIN — GLYCOPYRROLATE 0.3 MG: 0.2 INJECTION INTRAMUSCULAR; INTRAVENOUS at 05:10

## 2018-01-01 RX ADMIN — LAMIVUDINE 100 MG: 100 TABLET, FILM COATED ORAL at 03:09

## 2018-01-01 RX ADMIN — CARVEDILOL 12.5 MG: 12.5 TABLET, FILM COATED ORAL at 05:10

## 2018-01-01 RX ADMIN — DEXTROSE MONOHYDRATE 25 G: 25 INJECTION, SOLUTION INTRAVENOUS at 06:10

## 2018-01-01 RX ADMIN — DIPHENHYDRAMINE HYDROCHLORIDE 25 MG: 25 CAPSULE ORAL at 09:09

## 2018-01-01 RX ADMIN — Medication 200 MCG/HR: at 07:10

## 2018-01-01 RX ADMIN — INSULIN HUMAN 10 UNITS: 100 INJECTION, SOLUTION PARENTERAL at 11:10

## 2018-01-01 RX ADMIN — Medication 3 ML: at 05:10

## 2018-01-01 RX ADMIN — NOREPINEPHRINE BITARTRATE 1.2 MCG/KG/MIN: 1 INJECTION, SOLUTION, CONCENTRATE INTRAVENOUS at 12:10

## 2018-01-01 RX ADMIN — IPRATROPIUM BROMIDE AND ALBUTEROL SULFATE 3 ML: .5; 3 SOLUTION RESPIRATORY (INHALATION) at 07:05

## 2018-01-01 RX ADMIN — Medication 10 ML: at 09:09

## 2018-01-01 RX ADMIN — ATORVASTATIN CALCIUM 40 MG: 20 TABLET, FILM COATED ORAL at 01:10

## 2018-01-01 RX ADMIN — NOREPINEPHRINE BITARTRATE 1.5 MCG/KG/MIN: 1 INJECTION, SOLUTION, CONCENTRATE INTRAVENOUS at 07:10

## 2018-01-01 RX ADMIN — DEXMEDETOMIDINE HYDROCHLORIDE 0.4 MCG/KG/HR: 100 INJECTION, SOLUTION, CONCENTRATE INTRAVENOUS at 05:10

## 2018-01-01 RX ADMIN — GUAIFENESIN 600 MG: 600 TABLET, EXTENDED RELEASE ORAL at 09:05

## 2018-01-01 RX ADMIN — CARVEDILOL 25 MG: 25 TABLET, FILM COATED ORAL at 05:09

## 2018-01-01 RX ADMIN — DIPHENHYDRAMINE HYDROCHLORIDE 25 MG: 25 CAPSULE ORAL at 02:09

## 2018-01-01 RX ADMIN — NOREPINEPHRINE BITARTRATE 1.7 MCG/KG/MIN: 1 INJECTION, SOLUTION, CONCENTRATE INTRAVENOUS at 03:10

## 2018-01-01 RX ADMIN — FLUTICASONE FUROATE AND VILANTEROL TRIFENATATE 1 PUFF: 100; 25 POWDER RESPIRATORY (INHALATION) at 10:09

## 2018-01-01 RX ADMIN — ASPIRIN 81 MG 81 MG: 81 TABLET ORAL at 08:05

## 2018-01-01 RX ADMIN — HYDROCORTISONE SODIUM SUCCINATE 100 MG: 100 INJECTION, POWDER, FOR SOLUTION INTRAMUSCULAR; INTRAVENOUS at 02:10

## 2018-01-01 RX ADMIN — SODIUM CHLORIDE: 0.9 INJECTION, SOLUTION INTRAVENOUS at 09:09

## 2018-01-01 RX ADMIN — Medication 10 ML: at 05:09

## 2018-01-01 RX ADMIN — CEFEPIME 2 G: 2 INJECTION, POWDER, FOR SOLUTION INTRAVENOUS at 07:10

## 2018-01-01 RX ADMIN — CLINDAMYCIN HYDROCHLORIDE 300 MG: 150 CAPSULE ORAL at 01:10

## 2018-01-01 RX ADMIN — FLUDROCORTISONE ACETATE 100 MCG: 0.1 TABLET ORAL at 03:10

## 2018-01-01 RX ADMIN — FENTANYL CITRATE 100 MCG: 50 INJECTION INTRAMUSCULAR; INTRAVENOUS at 03:10

## 2018-01-01 RX ADMIN — DIPHENHYDRAMINE HYDROCHLORIDE 25 MG: 50 INJECTION, SOLUTION INTRAMUSCULAR; INTRAVENOUS at 12:10

## 2018-01-01 RX ADMIN — SODIUM CHLORIDE 1000 ML: 0.9 INJECTION, SOLUTION INTRAVENOUS at 09:09

## 2018-01-01 RX ADMIN — METRONIDAZOLE 500 MG: 500 INJECTION, SOLUTION INTRAVENOUS at 01:10

## 2018-01-01 RX ADMIN — SODIUM POLYSTYRENE SULFONATE 30 G: 15 SUSPENSION ORAL; RECTAL at 06:10

## 2018-01-01 RX ADMIN — AZITHROMYCIN MONOHYDRATE 250 MG: 250 TABLET ORAL at 12:09

## 2018-01-01 RX ADMIN — ATORVASTATIN CALCIUM 40 MG: 10 TABLET, FILM COATED ORAL at 09:09

## 2018-01-01 RX ADMIN — CARVEDILOL 12.5 MG: 12.5 TABLET, FILM COATED ORAL at 06:09

## 2018-01-01 RX ADMIN — SODIUM CHLORIDE: 0.9 INJECTION, SOLUTION INTRAVENOUS at 02:09

## 2018-01-01 RX ADMIN — Medication 200 MCG/HR: at 01:10

## 2018-01-01 RX ADMIN — ATORVASTATIN CALCIUM 40 MG: 20 TABLET, FILM COATED ORAL at 08:05

## 2018-01-01 RX ADMIN — AMLODIPINE BESYLATE 10 MG: 10 TABLET ORAL at 08:09

## 2018-01-01 RX ADMIN — AMLODIPINE BESYLATE 10 MG: 10 TABLET ORAL at 10:09

## 2018-01-01 RX ADMIN — ATORVASTATIN CALCIUM 40 MG: 20 TABLET, FILM COATED ORAL at 09:05

## 2018-01-01 RX ADMIN — CHLORHEXIDINE GLUCONATE 0.12% ORAL RINSE 15 ML: 1.2 LIQUID ORAL at 08:10

## 2018-01-01 RX ADMIN — VASOPRESSIN 0.04 UNITS/MIN: 20 INJECTION INTRAVENOUS at 04:10

## 2018-01-01 RX ADMIN — MIDAZOLAM HYDROCHLORIDE 2 MG: 1 INJECTION, SOLUTION INTRAMUSCULAR; INTRAVENOUS at 04:10

## 2018-01-01 RX ADMIN — EPINEPHRINE 0.9 MCG/KG/MIN: 1 INJECTION INTRAMUSCULAR; INTRAVENOUS; SUBCUTANEOUS at 03:10

## 2018-01-01 RX ADMIN — Medication 0.6 MCG/KG/MIN: at 12:10

## 2018-01-01 RX ADMIN — AZITHROMYCIN MONOHYDRATE 500 MG: 500 INJECTION, POWDER, LYOPHILIZED, FOR SOLUTION INTRAVENOUS at 03:05

## 2018-01-01 RX ADMIN — LORAZEPAM 2 MG: 2 INJECTION INTRAMUSCULAR; INTRAVENOUS at 05:10

## 2018-01-01 RX ADMIN — Medication 10 ML: at 11:09

## 2018-01-01 RX ADMIN — ATORVASTATIN CALCIUM 40 MG: 20 TABLET, FILM COATED ORAL at 08:10

## 2018-01-01 RX ADMIN — CARVEDILOL 12.5 MG: 12.5 TABLET, FILM COATED ORAL at 11:09

## 2018-01-01 RX ADMIN — DIPHENHYDRAMINE HYDROCHLORIDE 25 MG: 50 INJECTION, SOLUTION INTRAMUSCULAR; INTRAVENOUS at 01:10

## 2018-01-01 RX ADMIN — NOREPINEPHRINE BITARTRATE 2.4 MCG/KG/MIN: 1 INJECTION, SOLUTION, CONCENTRATE INTRAVENOUS at 03:10

## 2018-01-01 RX ADMIN — EPINEPHRINE 1 MCG/KG/MIN: 1 INJECTION INTRAMUSCULAR; INTRAVENOUS; SUBCUTANEOUS at 03:10

## 2018-01-01 RX ADMIN — MIDAZOLAM HYDROCHLORIDE 2 MG: 1 INJECTION INTRAMUSCULAR; INTRAVENOUS at 03:10

## 2018-01-01 RX ADMIN — FUROSEMIDE 40 MG: 10 INJECTION, SOLUTION INTRAMUSCULAR; INTRAVENOUS at 06:10

## 2018-01-01 RX ADMIN — HEPARIN SODIUM 5000 UNITS: 5000 INJECTION, SOLUTION INTRAVENOUS; SUBCUTANEOUS at 09:05

## 2018-01-01 RX ADMIN — CIPROFLOXACIN HYDROCHLORIDE 500 MG: 250 TABLET, FILM COATED ORAL at 12:09

## 2018-01-01 RX ADMIN — Medication 1.2 MCG/KG/MIN: at 03:10

## 2018-01-01 RX ADMIN — CHLORHEXIDINE GLUCONATE 0.12% ORAL RINSE 15 ML: 1.2 LIQUID ORAL at 10:10

## 2018-01-01 RX ADMIN — IOHEXOL 75 ML: 350 INJECTION, SOLUTION INTRAVENOUS at 12:09

## 2018-01-01 RX ADMIN — DEXMEDETOMIDINE HYDROCHLORIDE 0.2 MCG/KG/HR: 100 INJECTION, SOLUTION, CONCENTRATE INTRAVENOUS at 08:10

## 2018-01-01 RX ADMIN — NOREPINEPHRINE BITARTRATE 2.4 MCG/KG/MIN: 1 INJECTION, SOLUTION, CONCENTRATE INTRAVENOUS at 05:10

## 2018-01-01 RX ADMIN — ASPIRIN 81 MG CHEWABLE TABLET 81 MG: 81 TABLET CHEWABLE at 09:09

## 2018-01-01 RX ADMIN — DIPHENHYDRAMINE HYDROCHLORIDE 50 MG: 50 INJECTION, SOLUTION INTRAMUSCULAR; INTRAVENOUS at 05:10

## 2018-01-01 RX ADMIN — CLINDAMYCIN HYDROCHLORIDE 300 MG: 150 CAPSULE ORAL at 05:10

## 2018-01-01 RX ADMIN — METRONIDAZOLE 500 MG: 500 INJECTION, SOLUTION INTRAVENOUS at 05:10

## 2018-01-01 RX ADMIN — IPRATROPIUM BROMIDE AND ALBUTEROL SULFATE 3 ML: .5; 3 SOLUTION RESPIRATORY (INHALATION) at 05:10

## 2018-01-01 RX ADMIN — SODIUM BICARBONATE: 84 INJECTION, SOLUTION INTRAVENOUS at 03:10

## 2018-01-01 RX ADMIN — CEFTRIAXONE SODIUM 1 G: 1 INJECTION, POWDER, FOR SOLUTION INTRAMUSCULAR; INTRAVENOUS at 09:09

## 2018-01-01 RX ADMIN — IPRATROPIUM BROMIDE AND ALBUTEROL SULFATE 3 ML: .5; 3 SOLUTION RESPIRATORY (INHALATION) at 07:10

## 2018-01-01 RX ADMIN — HEPARIN 500 UNITS: 100 SYRINGE at 12:09

## 2018-01-01 RX ADMIN — VASOPRESSIN 0.04 UNITS/MIN: 20 INJECTION INTRAVENOUS at 11:10

## 2018-01-01 RX ADMIN — LIDOCAINE HYDROCHLORIDE 200 MG: 20 INJECTION, SOLUTION EPIDURAL; INFILTRATION; INTRACAUDAL; PERINEURAL at 03:09

## 2018-01-01 RX ADMIN — SODIUM CHLORIDE: 0.9 INJECTION, SOLUTION INTRAVENOUS at 01:09

## 2018-01-01 RX ADMIN — CALCIUM GLUCONATE 1 G: 94 INJECTION, SOLUTION INTRAVENOUS at 04:10

## 2018-01-01 RX ADMIN — Medication 10 ML: at 07:09

## 2018-01-01 RX ADMIN — CEFEPIME 2 G: 2 INJECTION, POWDER, FOR SOLUTION INTRAVENOUS at 10:10

## 2018-01-01 RX ADMIN — NOREPINEPHRINE BITARTRATE 2 MCG/KG/MIN: 1 INJECTION, SOLUTION, CONCENTRATE INTRAVENOUS at 02:10

## 2018-01-01 RX ADMIN — INSULIN HUMAN 8.82 UNITS: 100 INJECTION, SOLUTION PARENTERAL at 06:10

## 2018-01-01 RX ADMIN — HEPARIN 500 UNITS: 100 SYRINGE at 11:09

## 2018-01-01 RX ADMIN — AMLODIPINE BESYLATE 10 MG: 10 TABLET ORAL at 09:09

## 2018-01-01 RX ADMIN — CLINDAMYCIN HYDROCHLORIDE 300 MG: 150 CAPSULE ORAL at 09:10

## 2018-01-01 RX ADMIN — SODIUM CHLORIDE 1000 ML: 0.9 INJECTION, SOLUTION INTRAVENOUS at 05:10

## 2018-01-01 RX ADMIN — IPRATROPIUM BROMIDE AND ALBUTEROL SULFATE 3 ML: .5; 3 SOLUTION RESPIRATORY (INHALATION) at 03:10

## 2018-01-01 RX ADMIN — IPRATROPIUM BROMIDE 2 PUFF: 17 AEROSOL, METERED RESPIRATORY (INHALATION) at 08:10

## 2018-01-01 RX ADMIN — Medication 3 ML: at 02:10

## 2018-01-01 RX ADMIN — DIPHENHYDRAMINE HYDROCHLORIDE 25 MG: 50 INJECTION, SOLUTION INTRAMUSCULAR; INTRAVENOUS at 06:10

## 2018-01-01 RX ADMIN — MIDAZOLAM HYDROCHLORIDE 2 MG: 1 INJECTION INTRAMUSCULAR; INTRAVENOUS at 04:10

## 2018-01-01 RX ADMIN — VASOPRESSIN 0.04 UNITS/MIN: 20 INJECTION INTRAVENOUS at 05:10

## 2018-01-01 RX ADMIN — AMLODIPINE BESYLATE 10 MG: 10 TABLET ORAL at 09:05

## 2018-01-01 RX ADMIN — PREDNISONE 25 MG: 5 TABLET ORAL at 08:10

## 2018-01-01 RX ADMIN — CEFTRIAXONE SODIUM 1 G: 1 INJECTION, POWDER, FOR SOLUTION INTRAMUSCULAR; INTRAVENOUS at 05:05

## 2018-01-01 RX ADMIN — Medication 0.6 MCG/KG/MIN: at 01:10

## 2018-01-01 RX ADMIN — IPRATROPIUM BROMIDE AND ALBUTEROL SULFATE 3 ML: .5; 3 SOLUTION RESPIRATORY (INHALATION) at 03:05

## 2018-01-01 RX ADMIN — DIPHENHYDRAMINE HYDROCHLORIDE 25 MG: 25 CAPSULE ORAL at 03:10

## 2018-01-01 RX ADMIN — PREDNISONE 25 MG: 5 TABLET ORAL at 01:10

## 2018-01-01 RX ADMIN — ALLOPURINOL 300 MG: 300 TABLET ORAL at 04:09

## 2018-01-01 RX ADMIN — HEPARIN 500 UNITS: 100 SYRINGE at 02:09

## 2018-01-01 RX ADMIN — Medication 10 ML: at 12:09

## 2018-01-01 RX ADMIN — SODIUM CHLORIDE, PRESERVATIVE FREE 10 ML: 5 INJECTION INTRAVENOUS at 03:09

## 2018-01-01 RX ADMIN — CARVEDILOL 25 MG: 12.5 TABLET, FILM COATED ORAL at 09:09

## 2018-01-01 RX ADMIN — NOREPINEPHRINE BITARTRATE 1 MCG/KG/MIN: 1 INJECTION, SOLUTION, CONCENTRATE INTRAVENOUS at 04:10

## 2018-01-01 RX ADMIN — AZITHROMYCIN MONOHYDRATE 500 MG: 250 TABLET ORAL at 09:09

## 2018-01-01 RX ADMIN — Medication 175 MCG/HR: at 02:10

## 2018-01-01 RX ADMIN — CIPROFLOXACIN HYDROCHLORIDE 500 MG: 250 TABLET, FILM COATED ORAL at 10:09

## 2018-01-01 RX ADMIN — EPINEPHRINE 1 MG: 1 INJECTION, SOLUTION, CONCENTRATE INTRAVENOUS at 05:10

## 2018-01-01 RX ADMIN — Medication 0.1 MCG/KG/MIN: at 08:10

## 2018-01-01 RX ADMIN — IPRATROPIUM BROMIDE AND ALBUTEROL SULFATE 3 ML: .5; 3 SOLUTION RESPIRATORY (INHALATION) at 09:05

## 2018-01-01 RX ADMIN — IPRATROPIUM BROMIDE AND ALBUTEROL SULFATE 3 ML: .5; 3 SOLUTION RESPIRATORY (INHALATION) at 07:09

## 2018-01-01 RX ADMIN — GUAIFENESIN 600 MG: 600 TABLET, EXTENDED RELEASE ORAL at 11:05

## 2018-01-01 RX ADMIN — FLUTICASONE FUROATE AND VILANTEROL TRIFENATATE 1 PUFF: 100; 25 POWDER RESPIRATORY (INHALATION) at 09:05

## 2018-01-01 RX ADMIN — Medication 20 MCG/HR: at 01:10

## 2018-01-01 RX ADMIN — DEXMEDETOMIDINE HYDROCHLORIDE 0.2 MCG/KG/HR: 100 INJECTION, SOLUTION, CONCENTRATE INTRAVENOUS at 05:10

## 2018-01-01 RX ADMIN — METRONIDAZOLE 500 MG: 500 INJECTION, SOLUTION INTRAVENOUS at 04:10

## 2018-01-01 RX ADMIN — POTASSIUM CHLORIDE 40 MEQ: 1500 TABLET, EXTENDED RELEASE ORAL at 11:05

## 2018-01-01 RX ADMIN — ASPIRIN 81 MG 81 MG: 81 TABLET ORAL at 09:05

## 2018-01-01 RX ADMIN — CALCIUM GLUCONATE 1 G: 98 INJECTION, SOLUTION INTRAVENOUS at 03:10

## 2018-01-01 RX ADMIN — NICOTINE 1 PATCH: 21 PATCH, EXTENDED RELEASE TRANSDERMAL at 09:05

## 2018-01-01 RX ADMIN — IPRATROPIUM BROMIDE AND ALBUTEROL SULFATE 3 ML: .5; 3 SOLUTION RESPIRATORY (INHALATION) at 01:10

## 2018-01-01 RX ADMIN — DEXTROSE MONOHYDRATE 25 G: 25 INJECTION, SOLUTION INTRAVENOUS at 04:10

## 2018-01-01 RX ADMIN — VANCOMYCIN HYDROCHLORIDE 1250 MG: 10 INJECTION, POWDER, LYOPHILIZED, FOR SOLUTION INTRAVENOUS at 11:09

## 2018-01-01 RX ADMIN — FLUTICASONE FUROATE AND VILANTEROL TRIFENATATE 1 PUFF: 100; 25 POWDER RESPIRATORY (INHALATION) at 09:09

## 2018-01-01 RX ADMIN — MOXIFLOXACIN HYDROCHLORIDE 400 MG: 400 TABLET, FILM COATED ORAL at 08:05

## 2018-01-01 RX ADMIN — MOXIFLOXACIN HYDROCHLORIDE 400 MG: 400 TABLET, FILM COATED ORAL at 09:05

## 2018-01-01 RX ADMIN — HYDROCORTISONE SODIUM SUCCINATE 100 MG: 100 INJECTION, POWDER, FOR SOLUTION INTRAMUSCULAR; INTRAVENOUS at 09:10

## 2018-01-01 RX ADMIN — AMLODIPINE BESYLATE 10 MG: 10 TABLET ORAL at 01:10

## 2018-01-01 RX ADMIN — CEFEPIME 2 G: 2 INJECTION, POWDER, FOR SOLUTION INTRAVENOUS at 11:09

## 2018-01-01 RX ADMIN — DIPHENHYDRAMINE HYDROCHLORIDE 25 MG: 25 CAPSULE ORAL at 07:10

## 2018-01-01 RX ADMIN — EPINEPHRINE 1 MG: 0.1 INJECTION, SOLUTION ENDOTRACHEAL; INTRACARDIAC; INTRAVENOUS at 02:10

## 2018-01-01 RX ADMIN — FLUCONAZOLE 400 MG: 200 TABLET ORAL at 05:09

## 2018-01-01 RX ADMIN — Medication 100 MCG/HR: at 03:10

## 2018-01-01 RX ADMIN — SODIUM POLYSTYRENE SULFONATE 30 G: 15 SUSPENSION ORAL; RECTAL at 01:10

## 2018-01-01 RX ADMIN — SODIUM CHLORIDE: 0.9 INJECTION, SOLUTION INTRAVENOUS at 03:10

## 2018-01-01 RX ADMIN — ACETAMINOPHEN 650 MG: 325 TABLET ORAL at 07:10

## 2018-01-01 RX ADMIN — EPINEPHRINE 0.7 MCG/KG/MIN: 1 INJECTION INTRAMUSCULAR; INTRAVENOUS; SUBCUTANEOUS at 11:10

## 2018-01-01 RX ADMIN — HYDROMORPHONE HYDROCHLORIDE 0.5 MG: 2 INJECTION INTRAMUSCULAR; INTRAVENOUS; SUBCUTANEOUS at 04:09

## 2018-01-01 RX ADMIN — METHYLPREDNISOLONE SODIUM SUCCINATE 125 MG: 125 INJECTION, POWDER, FOR SOLUTION INTRAMUSCULAR; INTRAVENOUS at 02:05

## 2018-01-01 RX ADMIN — FAMOTIDINE 20 MG: 10 INJECTION, SOLUTION INTRAVENOUS at 05:10

## 2018-01-01 RX ADMIN — SODIUM BICARBONATE: 84 INJECTION, SOLUTION INTRAVENOUS at 02:10

## 2018-01-01 RX ADMIN — NICOTINE 1 PATCH: 14 PATCH, EXTENDED RELEASE TRANSDERMAL at 01:10

## 2018-01-01 RX ADMIN — HYDROCORTISONE SODIUM SUCCINATE 100 MG: 100 INJECTION, POWDER, FOR SOLUTION INTRAMUSCULAR; INTRAVENOUS at 06:10

## 2018-01-01 RX ADMIN — EPINEPHRINE 0.06 MCG/KG/MIN: 1 INJECTION INTRAMUSCULAR; INTRAVENOUS; SUBCUTANEOUS at 11:10

## 2018-01-01 RX ADMIN — HEPARIN 500 UNITS: 100 SYRINGE at 03:09

## 2018-01-01 RX ADMIN — FLUCONAZOLE 400 MG: 200 TABLET ORAL at 08:09

## 2018-01-01 RX ADMIN — HYDROMORPHONE HYDROCHLORIDE: 2 INJECTION INTRAMUSCULAR; INTRAVENOUS; SUBCUTANEOUS at 03:09

## 2018-01-01 RX ADMIN — CARVEDILOL 25 MG: 25 TABLET, FILM COATED ORAL at 10:09

## 2018-01-01 RX ADMIN — HEPARIN SODIUM 5000 UNITS: 5000 INJECTION, SOLUTION INTRAVENOUS; SUBCUTANEOUS at 03:05

## 2018-01-01 RX ADMIN — NOREPINEPHRINE BITARTRATE 2 MCG/KG/MIN: 1 INJECTION, SOLUTION, CONCENTRATE INTRAVENOUS at 09:10

## 2018-01-01 RX ADMIN — CEFEPIME HYDROCHLORIDE 1 G: 1 INJECTION, SOLUTION INTRAVENOUS at 02:09

## 2018-01-01 RX ADMIN — HEPARIN SODIUM 5000 UNITS: 5000 INJECTION, SOLUTION INTRAVENOUS; SUBCUTANEOUS at 10:05

## 2018-01-01 RX ADMIN — METHYLPREDNISOLONE SODIUM SUCCINATE 125 MG: 125 INJECTION, POWDER, FOR SOLUTION INTRAMUSCULAR; INTRAVENOUS at 05:10

## 2018-01-01 RX ADMIN — IPRATROPIUM BROMIDE AND ALBUTEROL SULFATE 3 ML: .5; 3 SOLUTION RESPIRATORY (INHALATION) at 08:05

## 2018-01-01 RX ADMIN — SODIUM BICARBONATE: 84 INJECTION, SOLUTION INTRAVENOUS at 04:10

## 2018-01-01 RX ADMIN — DEXTROSE MONOHYDRATE 25 G: 25 INJECTION, SOLUTION INTRAVENOUS at 11:10

## 2018-01-01 RX ADMIN — MAGNESIUM SULFATE IN WATER 2 G: 40 INJECTION, SOLUTION INTRAVENOUS at 06:10

## 2018-01-01 RX ADMIN — NICOTINE 1 PATCH: 14 PATCH, EXTENDED RELEASE TRANSDERMAL at 08:09

## 2018-01-01 RX ADMIN — FLUDROCORTISONE ACETATE 100 MCG: 0.1 TABLET ORAL at 08:10

## 2018-01-01 RX ADMIN — IOHEXOL 100 ML: 350 INJECTION, SOLUTION INTRAVENOUS at 08:09

## 2018-01-01 RX ADMIN — FENTANYL CITRATE 50 MCG: 50 INJECTION INTRAMUSCULAR; INTRAVENOUS at 04:10

## 2018-01-01 RX ADMIN — NOREPINEPHRINE BITARTRATE 3 MCG/KG/MIN: 1 INJECTION, SOLUTION, CONCENTRATE INTRAVENOUS at 03:10

## 2018-01-01 RX ADMIN — ALBUTEROL SULFATE 2.5 MG: 2.5 SOLUTION RESPIRATORY (INHALATION) at 04:05

## 2018-01-01 RX ADMIN — AZITHROMYCIN MONOHYDRATE 250 MG: 250 TABLET ORAL at 09:09

## 2018-01-01 RX ADMIN — ACETAMINOPHEN 650 MG: 325 TABLET ORAL at 04:05

## 2018-05-11 PROBLEM — E87.6 HYPOKALEMIA: Status: ACTIVE | Noted: 2018-01-01

## 2018-05-11 PROBLEM — Z86.73 HISTORY OF CVA (CEREBROVASCULAR ACCIDENT): Status: ACTIVE | Noted: 2018-01-01

## 2018-05-11 PROBLEM — I10 ESSENTIAL HYPERTENSION: Status: ACTIVE | Noted: 2018-01-01

## 2018-05-11 PROBLEM — F17.200 TOBACCO DEPENDENCE: Status: ACTIVE | Noted: 2018-01-01

## 2018-05-11 PROBLEM — J18.9 CAP (COMMUNITY ACQUIRED PNEUMONIA): Status: ACTIVE | Noted: 2018-01-01

## 2018-05-11 PROBLEM — J44.1 COPD EXACERBATION: Status: ACTIVE | Noted: 2018-01-01

## 2018-05-11 PROBLEM — J96.01 ACUTE HYPOXEMIC RESPIRATORY FAILURE: Status: ACTIVE | Noted: 2018-01-01

## 2018-05-11 PROBLEM — I25.10 CORONARY ARTERY DISEASE INVOLVING NATIVE CORONARY ARTERY OF NATIVE HEART WITHOUT ANGINA PECTORIS: Status: ACTIVE | Noted: 2018-01-01

## 2018-05-11 PROBLEM — N17.9 AKI (ACUTE KIDNEY INJURY): Status: ACTIVE | Noted: 2018-01-01

## 2018-05-11 NOTE — ED NOTES
Spoke with Brianne from transfer Center per patient's and son request to be transferred and admitted to [facility] on [facility].

## 2018-05-11 NOTE — ED NOTES
Dr. Rodriguez notified pt will be [facility] to the [facility] [place].

## 2018-05-11 NOTE — ED NOTES
Patient's oxygen saturation decreased to 88% on room air, with obvious signs of shortness of breath. Assessed and evaluated per LEXX Tovar. PLaced oxygen and maintained 2 lpm per nasal cannula, increased saturation to 95%

## 2018-05-11 NOTE — PLAN OF CARE
Please call extension [phone] (if nobody answers, this will flip to a beeper, so put in your call back number) upon patient arrival to floor for Hospital Medicine admit team assignment and for additional admit orders for the patient.  Do not page the attending, staff physician associate with the patient on arrival (may not be in-house at the time of arrival).  Rather, always call [phone] to reach the triage physician for orders and team assignment.        Outside Transfer Acceptance Note     Patients name:      Transferring Physician or Mid-Level provider/Clinic giving report:   Dr Hernandez at [facility] ED     Accepting Physician for admission to hospital: Cliff Hernandez MD        Date of acceptance:  05/11/2018       Reason for transfer:  tx COPD exacerbation; pt request [facility] [place] only     Report from Physician/Mid-Level Provider:    HPI:  62M with COPD presented with worsening SOB, cough, wheezing.  He got some mild improvement with nebulizer treatments but still with poor air movement and requiring 2L NC.    VS: HR 70s, RR 16-24, SaO2 100% on 2LNC    Labs:  K 2.7, HCO3 30, Cr 2.46/BUN 26 (unk baseline)    Radiographs: CXR with emphysematous and fibrotic changes, no focal consolidation    To Do List upon arrival:  continue nebs, steroids, wean oxygen     Please call extension [phone] (if nobody answers, this will flip to a beeper, so put in your call back number) upon patient arrival to floor for Hospital Medicine admit team assignment and for additional admit orders for the patient.  Do not page the attending, staff physician associate with the patient on arrival (may not be in-house at the time of arrival).  Rather, always call [phone] to reach the triage physician for orders and team assignment.

## 2018-05-11 NOTE — ED NOTES
patient requesting to go be admitted to [facility] and refusing to being admitted to [facility]. Informed patient of [facility]'s acceptance, refused and Dr. Hernandez spoke with son and patient of possible delay in being transferred. Son and patient. both verbalized understanding of delay of a bed assignment and care.

## 2018-05-11 NOTE — ED PROVIDER NOTES
"    New Prescriptions    No medications on file    eMERGENCY dEPARTMENT eNCOUnter    CHIEF COMPLAINT    Chief Complaint   Patient presents with    Chills     states for 5 to 5 days that he has had the chills with a cough that is non productive. complaint also of a sore throat    Cough    Sore Throat       HPI    Kevin Crews is a 62 y.o. male who presents to the ED with cough and sore throat. States he started coughing Tuesday and has been wheezing. He states that he has been taking his inhaler, but not getting better. He reports stopping smoking on Monday. He states it hurts to cough or breathe. He reports coughing up "yellow shit."      CURRENT MEDICATIONS    No current facility-administered medications on file prior to encounter.      No current outpatient prescriptions on file prior to encounter.         ALLERGIES    Review of patient's allergies indicates:  No Known Allergies    PAST MEDICAL HISTORY  No past medical history on file.    SURGICAL HISTORY    No past surgical history on file.    SOCIAL HISTORY    Social History     Social History    Marital status:      Spouse name: N/A    Number of children: N/A    Years of education: N/A     Social History Main Topics    Smoking status: Not on file    Smokeless tobacco: Not on file    Alcohol use Not on file    Drug use: Unknown    Sexual activity: Not on file     Other Topics Concern    Not on file     Social History Narrative    No narrative on file       FAMILY HISTORY    No family history on file.    REVIEW OF SYSTEMS   ROS  Constitutional:  Reports fever and chills, no weight loss or weakness.   Eyes:  No  Photophobia, blurred vision or discharge.   HENT:  No ear pain, nasal congestion, reports sore throat..  Respiratory:  Reports cough, no shortness of breath, reports wheezing.   Cardiovascular:  Reports pleuritic chest pain, no palpitations or swelling.   GI:  No abdominal pain, nausea, vomiting, or diarrhea.  : No dysuria, " frequency   Musculoskeletal:  No back pain or neck pain.   Skin:  No reported rashes or infected lesions.   Neurologic:  No reported headache.  All Systems otherwise negative except as noted in the History of Present Illness.        PHYSICAL EXAM    Reviewed Triage Note  VITAL SIGNS: /62 (BP Location: Right arm, Patient Position: Sitting)   Pulse 77   Temp (!) 101 °F (38.3 °C) (Oral)   Resp (!) 24   Wt 90.7 kg (200 lb)   SpO2 98%    Vitals:    05/11/18 1639   BP: 109/62   Pulse: 77   Resp: (!) 24   Temp:        Physical Exam  Nursing Notes and Vital Signs Reviewed  Constitutional:  Well-developed, well-nourished, elderly male in mild distress.  HENT:  Normocephalic, atraumatic. Bilateral external EACs normal, Bilateral TMs normal. Nose normal, no nasal mucosal edema, no rhinorrhea. Mouth mucus membranes P & M, no oral lesions. Oropharynx with erythema, no edema or exudate, uvula midline.   Eyes:  PERRL EOMI. Conjunctiva normal without discharge.   Neck: Normal range of motion. No midline tenderness or vertebral step-off. No stridor. No meningismus. No lymphadenopathy.   Respiratory:  Diminished breath sounds bilaterally.  Mild respiratory distress, no retractions, exhibits conversational dyspnea. Bilateral wheezing. No rhonchi. No rales.   Cardiovascular:  Normal heart rate. Normal rhythm. No pitting lower extremity edema.   Integument:  Warm and dry. No rash. No petechiae  Neurologic:   Alert and Interactive. MAEW. Gait steady.   Psychiatric:  Affect normal. Mood normal.         LABS  Pertinent labs reviewed. (See chart for details)           RADIOLOGY    Imaging Results          X-Ray Chest AP Portable (Final result)  Result time 05/11/18 14:21:25    Final result by Anson Alfaro MD (05/11/18 14:21:25)                 Impression:      COPD    Right basilar atelectasis versus vascular crowding.    Mild nonspecific interstitial coarsening at the pulmonary bases.      Electronically signed by: Anson   MD Dominic  Date:    05/11/2018  Time:    14:21             Narrative:    EXAMINATION:  XR CHEST AP PORTABLE    CLINICAL HISTORY:  cough;    TECHNIQUE:  Single frontal view of the chest was performed.    COMPARISON:  None    FINDINGS:  Heart size probably within normal limits for technique.  Emphysematous changes.  Right basilar atelectasis versus vascular crowding.  There is also mild interstitial coarsening at the pulmonary bases.  Ribs appear intact.                                PROCEDURES    Procedures      EKG         ED COURSE & MEDICAL DECISION MAKING    Pertinent & Imaging studies reviewed. (See chart for details and specific orders.)  1450 after duoneb x 3 and solumedrol patient's r/a sat is 88%.  The chest xray shows right basilar atelectasis. Will treat as pneumonia and not as COPD exacerbation as I initially suspected. Labs, Zithromax immediately ordered after evaluating response to treatment and xray. Will admit.   1516 Spoke with Dr. Amaral Hospitalist at [facility] he accepts the patient for inpatient management. The patient agrees. However, when he called his son to tell him he was being transferred for admission to [facility], the son insisted that he be transferred to [facility].   1615 Accepted by Dr. Marquez [facility].   1640 patient is now febrile.    1750 [facility] to [facility] in stable condition temp down to 100.4    Medications   albuterol-ipratropium 2.5mg-0.5mg/3mL nebulizer solution 3 mL (0 mLs Nebulization Override Pull 5/11/18 1420)   cefTRIAXone injection 1 g (not administered)   methylPREDNISolone sodium succinate injection 125 mg (125 mg Intravenous Given 5/11/18 1420)   albuterol-ipratropium 2.5mg-0.5mg/3mL (DUO-NEB) 0.5 mg-3 mg(2.5 mg base)/3 mL nebulizer solution (3 mLs  Given 5/11/18 1433)   albuterol-ipratropium 2.5mg-0.5mg/3mL (DUO-NEB) 0.5 mg-3 mg(2.5 mg base)/3 mL nebulizer solution (3 mLs  Given 5/11/18 1427)   albuterol  nebulizer solution 2.5 mg (2.5 mg Nebulization Given 5/11/18 1602)   azithromycin 500 mg in dextrose 5 % 250 mL IVPB (ready to mix system) (0 mg Intravenous Stopped 5/11/18 1657)   potassium chloride SA CR tablet 40 mEq (40 mEq Oral Given 5/11/18 1631)   acetaminophen tablet 650 mg (650 mg Oral Given 5/11/18 1645)           FINAL IMPRESSION    1. Pneumonia of right lower lobe due to infectious organism    2. COPD with acute exacerbation    3. Hypokalemia    4. Renal insufficiency        Differential Diagnosis: pneumonia                                       pleurisy    Patient admitted                   [place] Avalos, LEXX  05/11/18 7804

## 2018-05-11 NOTE — ED TRIAGE NOTES
Patient is AAOx3, reported intermittent chills and coughing up yellowish color sputum x5 days. Patient also reported recently stopped smoking x5 days ago. Denies taking an medications at home for symptoms.

## 2018-05-12 NOTE — H&P
History and Physical  Hospital Medicine       Patient Name: Kevin Cresw  MRN:  [id number]  Hospital Medicine Team: Networked reference to record PCT  Kamran Marcano MD  Date of Admission:  5/11/2018     Principal Problem:  COPD exacerbation   Primary Care Physician: No primary care provider on file.      History of Present Illness:     Mr. Kevin Crews is a 62 y.o. male with COPD, CAD s/p stent, BPH, and history of CVA with left sided weakness who presents as transfer from [facility] ED for COPD exacerbation vs pneumonia. He states that since Monday, he has been developing worsening shortness of breath and wheezing. Says that initially he felt like his throat was swollen, although this has improved now. Does report two intermittent episodes of pleuritic type chest pain. Additionally, he has had productive sputum during this time. He states he is now short of breath even walking to his driveway. He does not use O2 at home. He is a current 1.5ppd smoker.    Review of Systems   Constitutional: Positive for chills (cold, but not c/w rigors), fever and malaise/fatigue.   HENT: Positive for congestion. Negative for sore throat.    Eyes: Negative for discharge.   Respiratory: Positive for cough, sputum production, shortness of breath and wheezing.    Cardiovascular: Positive for chest pain (pleuritic). Negative for palpitations and leg swelling.   Gastrointestinal: Positive for diarrhea (resovled now). Negative for abdominal pain, nausea and vomiting.   Genitourinary: Negative for dysuria.   Musculoskeletal: Negative for myalgias.   Skin: Negative for itching and rash.   Neurological: Positive for weakness and headaches. Negative for dizziness and loss of consciousness.   Psychiatric/Behavioral: Negative for depression and substance abuse.         Past Medical History: Patient has a past medical history of Heart attack (2010); Hypertension; and Stroke.    Past Surgical History: Patient has a past surgical  history that includes Coronary angioplasty with stent.    Social History: Patient reports that he has been smoking.  He has never used smokeless tobacco. He reports that he does not drink alcohol or use drugs.    Family History: family history is not on file.    Medications: Scheduled Meds:   albuterol-ipratropium 2.5mg-0.5mg/3mL  3 mL Nebulization Q4H WAKE    [START ON 5/12/2018] amLODIPine  10 mg Oral Daily    [START ON 5/12/2018] aspirin  81 mg Oral Daily    [START ON 5/12/2018] atorvastatin  40 mg Oral Daily    [START ON 5/12/2018] fluticasone-vilanterol  1 puff Inhalation Daily    heparin (porcine)  5,000 Units Subcutaneous Q8H    [START ON 5/12/2018] hydroCHLOROthiazide  25 mg Oral Daily    [START ON 5/12/2018] lisinopril  40 mg Oral Daily    moxifloxacin  400 mg Oral Daily    [START ON 5/12/2018] nicotine  1 patch Transdermal Daily    potassium chloride  40 mEq Oral Once    [START ON 5/12/2018] predniSONE  40 mg Oral Daily     Continuous Infusions:  PRN Meds:.sodium chloride 0.9%    Allergies: Patient has No Known Allergies.    Physical Exam:     Vital Signs (Most Recent):  Temp: 98.7 °F (37.1 °C) (05/11/18 1904)  Pulse: 68 (05/11/18 1904)  Resp: (!) 22 (05/11/18 1904)  BP: 107/63 (05/11/18 1904)  SpO2: 95 % (05/11/18 1904) Vital Signs Range (Last 24H):  Temp:  [98.7 °F (37.1 °C)-101 °F (38.3 °C)]   Pulse:  [68-84]   Resp:  [16-24]   BP: (102-117)/(57-64)   SpO2:  [93 %-100 %]    Body mass index is 27.89 kg/m².     Physical Exam   Constitutional: He is oriented to person, place, and time and well-developed, well-nourished, and in no distress. No distress.   HENT:   Head: Normocephalic and atraumatic.   Mouth/Throat: No oropharyngeal exudate.   Eyes: EOM are normal. Pupils are equal, round, and reactive to light. No scleral icterus.   Neck: No JVD present. No tracheal deviation present.   Cardiovascular: Normal rate and regular rhythm.    No murmur heard.  Pulmonary/Chest: Effort normal. No  respiratory distress. He has wheezes.   Tight breath sounds, on 2L NC   Abdominal: Soft. He exhibits no distension. There is no tenderness.   Musculoskeletal: He exhibits no edema or deformity.   Neurological: He is alert and oriented to person, place, and time.   Skin: Skin is warm and dry. No erythema.   Psychiatric: Affect and judgment normal.   Vitals reviewed.  .      Recent Labs  Lab 05/11/18  1458   WBC 3.61*   HGB 11.3*   HCT 34.9*   *         Recent Labs  Lab 05/11/18  1458      K 2.7*   CL 94*   CO2 30*   BUN 26*   CREATININE 2.46*   *   CALCIUM 8.3*       Recent Labs  Lab 05/11/18  1458   ALKPHOS 72   ALT 40   AST 70*   ALBUMIN 3.8   PROT 7.2   BILITOT 0.6      No results for input(s): POCTGLUCOSE in the last 168 hours.      Assessment and Plan:     Active Hospital Problems    Diagnosis  POA    *COPD exacerbation [J44.1]  Yes      Resolved Hospital Problems    Diagnosis Date Resolved POA   No resolved problems to display.       Mr. Kevin Crews is a 62 y.o. male with COPD who presents with COPD exacerbation.    Acute hypoxic respiratory failure 2/2 COPD exacerbation and CAP  - presents requiring supplemental O2 with productive cough and wheezing; CXR with atelectasis and emphysematous changes; per report with fever 101 at OSH  - will bolus 1L NS for now  - initiate moxifloxacin 400mg daily to cover for possible PNA (not clear consolidation on CXR, but given fever, productive sputum, will cover)  - prednisone 40 (given solumedrol 125 x1 at OSH)  - initiate breo (patient on inhaler regimen at home but is unsure of names of meds)  - duonebs q4 WA and prn  - supplemental O2  - guaifenesin  - COPD pathway initiated    CAD s/p stent  - continue home ASA 81, atorvastatin 40, holding lisinopril 40 in setting of RUDI vs CKD  - patient is unsure of all his meds, unclear if he is taking beta blocker; will defer for now    RUDI vs CKD  - presents with Cr 2.46 with unknown baseline  - will hold  lisinopril and HCTZ for now  - avoid nephrotoxic meds  - IVF as above    Hx of CVA  - continue ASA and statin    Hypokalemia  - presents with K 2.7, given 40meq of K at OSH  - will give add'l 40meq and repeat K and Mg, will likely need further supplementation    HTN, essential  - continue home amlodipine  - holding lisinopril, HCTZ as above    Tobacco dependence  - nicotine patch  - recommend cessation    Diet:  renal  DVT PPx: SQH    Disposition:  Pending improvement in respiratory status      Kamran Marcano MD  Hospital Medicine  Pager: [phone]  Spectra: [phone]

## 2018-05-12 NOTE — SUBJECTIVE & OBJECTIVE
Interval History: No reported events overnight. Symptoms improvign per patient. Sputum culture sent to lab.     Review of Systems   Constitutional: Positive for activity change, appetite change, chills, fatigue and fever.   HENT: Positive for congestion. Negative for trouble swallowing and voice change.    Eyes: Negative for photophobia and visual disturbance.   Respiratory: Positive for cough, shortness of breath and wheezing. Negative for choking.    Cardiovascular: Negative for chest pain and leg swelling.   Gastrointestinal: Negative for abdominal pain, anal bleeding, constipation and nausea.   Genitourinary: Negative for difficulty urinating and dysuria.   Musculoskeletal: Negative for back pain and gait problem.   Skin: Negative for color change.   Allergic/Immunologic: Positive for immunocompromised state.   Neurological: Negative for dizziness, seizures, facial asymmetry, weakness and numbness.   Psychiatric/Behavioral: Negative for agitation.     Objective:     Vital Signs (Most Recent):  Temp: 98.1 °F (36.7 °C) (05/12/18 1125)  Pulse: 62 (05/12/18 1506)  Resp: 14 (05/12/18 1506)  BP: 123/63 (05/12/18 1125)  SpO2: (!) 91 % (05/12/18 1506) Vital Signs (24h Range):  Temp:  [97.7 °F (36.5 °C)-101 °F (38.3 °C)] 98.1 °F (36.7 °C)  Pulse:  [60-78] 62  Resp:  [14-24] 14  SpO2:  [90 %-100 %] 91 %  BP: (100-135)/(57-82) 123/63     Weight: 90.7 kg (200 lb)  Body mass index is 27.89 kg/m².    Intake/Output Summary (Last 24 hours) at 05/12/18 1518  Last data filed at 05/12/18 1100   Gross per 24 hour   Intake                0 ml   Output             1000 ml   Net            -1000 ml      Physical Exam   Constitutional: He is oriented to person, place, and time. He appears well-nourished. No distress.   HENT:   Head: Normocephalic and atraumatic.   Eyes: Pupils are equal, round, and reactive to light.   Neck: Normal range of motion.   Cardiovascular: Normal rate and regular rhythm.    No murmur heard.  Pulmonary/Chest:  Effort normal. He has wheezes (expiratory ). He has no rales.   Abdominal: Soft. Bowel sounds are normal. He exhibits no distension.   Musculoskeletal: Normal range of motion. He exhibits no edema.   Neurological: He is alert and oriented to person, place, and time. No cranial nerve deficit. Coordination normal.   Skin: Skin is warm and dry.   Psychiatric: He has a normal mood and affect.   Nursing note and vitals reviewed.      Significant Labs: All pertinent labs within the past 24 hours have been reviewed.    Significant Imaging: I have reviewed all pertinent imaging results/findings within the past 24 hours.

## 2018-05-12 NOTE — PROGRESS NOTES
[facility] Hospital Medicine  Progress Note    Patient Name: Kevin Crews  MRN: [id number]  Patient Class: OP- Observation   Admission Date: 5/11/2018  Length of Stay: 0 days  Attending Physician: Mahogany Li MD  Primary Care Provider: No primary care provider on file.    Hospital Medicine Team: [facility] HOSP MED E Carlo Reese PA-C    Subjective:     Principal Problem:Acute hypoxemic respiratory failure    HPI:  Mr. Kevin Crews is a 62 y.o. male with COPD, CAD s/p stent, BPH, and history of CVA with left sided weakness who presents as transfer from [facility] ED for COPD exacerbation vs pneumonia. He states that since Monday, he has been developing worsening shortness of breath and wheezing. Says that initially he felt like his throat was swollen, although this has improved now. Does report two intermittent episodes of pleuritic type chest pain. Additionally, he has had productive sputum during this time. He states he is now short of breath even walking to his driveway. He does not use O2 at home. He is a current 1.5ppd smoker.    Hospital Course:  Kevin Crews was placed in observation for acute COPD exacerbation with possible pneumonia. CXR demonstrated COPD, right basilar atelectasis vs vascular crowding. Steroids and antibiotics administered.     Interval History: No reported events overnight. Symptoms improvign per patient. Sputum culture sent to lab.     Review of Systems   Constitutional: Positive for activity change, appetite change, chills, fatigue and fever.   HENT: Positive for congestion. Negative for trouble swallowing and voice change.    Eyes: Negative for photophobia and visual disturbance.   Respiratory: Positive for cough, shortness of breath and wheezing. Negative for choking.    Cardiovascular: Negative for chest pain and leg swelling.   Gastrointestinal: Negative for abdominal pain, anal bleeding, constipation and nausea.   Genitourinary: Negative for difficulty  urinating and dysuria.   Musculoskeletal: Negative for back pain and gait problem.   Skin: Negative for color change.   Allergic/Immunologic: Positive for immunocompromised state.   Neurological: Negative for dizziness, seizures, facial asymmetry, weakness and numbness.   Psychiatric/Behavioral: Negative for agitation.     Objective:     Vital Signs (Most Recent):  Temp: 98.1 °F (36.7 °C) (05/12/18 1125)  Pulse: 62 (05/12/18 1506)  Resp: 14 (05/12/18 1506)  BP: 123/63 (05/12/18 1125)  SpO2: (!) 91 % (05/12/18 1506) Vital Signs (24h Range):  Temp:  [97.7 °F (36.5 °C)-101 °F (38.3 °C)] 98.1 °F (36.7 °C)  Pulse:  [60-78] 62  Resp:  [14-24] 14  SpO2:  [90 %-100 %] 91 %  BP: (100-135)/(57-82) 123/63     Weight: 90.7 kg (200 lb)  Body mass index is 27.89 kg/m².    Intake/Output Summary (Last 24 hours) at 05/12/18 1518  Last data filed at 05/12/18 1100   Gross per 24 hour   Intake                0 ml   Output             1000 ml   Net            -1000 ml      Physical Exam   Constitutional: He is oriented to person, place, and time. He appears well-nourished. No distress.   HENT:   Head: Normocephalic and atraumatic.   Eyes: Pupils are equal, round, and reactive to light.   Neck: Normal range of motion.   Cardiovascular: Normal rate and regular rhythm.    No murmur heard.  Pulmonary/Chest: Effort normal. He has wheezes (expiratory ). He has no rales.   Abdominal: Soft. Bowel sounds are normal. He exhibits no distension.   Musculoskeletal: Normal range of motion. He exhibits no edema.   Neurological: He is alert and oriented to person, place, and time. No cranial nerve deficit. Coordination normal.   Skin: Skin is warm and dry.   Psychiatric: He has a normal mood and affect.   Nursing note and vitals reviewed.      Significant Labs: All pertinent labs within the past 24 hours have been reviewed.    Significant Imaging: I have reviewed all pertinent imaging results/findings within the past 24 hours.    Assessment/Plan:       * Acute hypoxemic respiratory failure    COPD   - expiratory wheezing on exam, Spo2 91-94% on 2L NC  - CXR with with atelectasis and vascular crowding, no fever since admit  - continue Prednisone 40mg  - DuoNebs Q4H  - sputum culture/gram stain collected        CAP (community acquired pneumonia)    - See above  - afebrile since admit  - neutropenic precautions in place  - continue moxifloxacin tablet 400 mg         RUDI (acute kidney injury)    Improving 2.4-->1.9 (baseline unknown)  - avoid nephrotoxins   - IVF        Hypokalemia    Improved with supplementation on admit, 3.8  - will continue to monitor        Tobacco dependence    Daily smoker, 1.5ppd  - education provided regarding smoking cessation, patient verbalized understanding   - nicotine patch in place          Essential hypertension    Controlled   - lisinopril (PRINIVIL,ZESTRIL) 40 MG tablet  - amLODIPine (NORVASC) 10 MG tablet          VTE Risk Mitigation         Ordered     heparin (porcine) injection 5,000 Units  Every 8 hours      05/11/18 1943     IP VTE HIGH RISK PATIENT  Once      05/11/18 1943              Carlo Reese PA-C  Department of Hospital Medicine   [facility]

## 2018-05-12 NOTE — PLAN OF CARE
Problem: Patient Care Overview  Goal: Plan of Care Review  Outcome: Ongoing (interventions implemented as appropriate)  POC reviewed with pt.  Pt on Neutropenic Isolation 2L O2 NC. Safety precautions maintained. Bed in low position wheels locked. Side rails up x 2. Call light within reach. Pt free of fall.

## 2018-05-12 NOTE — ASSESSMENT & PLAN NOTE
- See above  - afebrile since admit  - neutropenic precautions in place  - continue moxifloxacin tablet 400 mg

## 2018-05-12 NOTE — HOSPITAL COURSE
Kevin Crews was placed in observation for acute COPD exacerbation with possible pneumonia. CXR demonstrated COPD, right basilar atelectasis vs vascular crowding. Steroids and antibiotics administered. Patient qualified for home oxygen per 6 minute walk test. Patient improved with steroids, scheduled nebs. Patient stable for discharge with home O2.

## 2018-05-12 NOTE — ASSESSMENT & PLAN NOTE
COPD   - expiratory wheezing on exam, Spo2 91-94% on 2L NC  - CXR with with atelectasis and vascular crowding, no fever since admit  - continue Prednisone 40mg  - DuoNebs Q4H  - sputum culture/gram stain collected

## 2018-05-12 NOTE — PLAN OF CARE
Problem: Patient Care Overview  Goal: Plan of Care Review  Outcome: Ongoing (interventions implemented as appropriate)  Plan of care reviewed with patient. No distress noted this time. Fall precautions maintained with bed in lowest position. Pt instructed to call for assistance as needed. Bolus given. HOB elevated. Will continue to monitor closely.

## 2018-05-12 NOTE — ASSESSMENT & PLAN NOTE
Daily smoker, 1.5ppd  - education provided regarding smoking cessation, patient verbalized understanding   - nicotine patch in place

## 2018-05-12 NOTE — PROGRESS NOTES
Pt arrived to OBS 12 on stretcher via [facility] on 2L O2 nc.. Pt aao x3 ambulated from stretcher with cane to bed. Safety precautions maintained. Bed in low position wheels locked. Side rails up x 2. Call light within reach. Urinal @ bedside. Pt oriented to call light and OBS unit.

## 2018-05-12 NOTE — HPI
Mr. Kevin Crews is a 62 y.o. male with COPD, CAD s/p stent, BPH, and history of CVA with left sided weakness who presents as transfer from [facility] ED for COPD exacerbation vs pneumonia. He states that since Monday, he has been developing worsening shortness of breath and wheezing. Says that initially he felt like his throat was swollen, although this has improved now. Does report two intermittent episodes of pleuritic type chest pain. Additionally, he has had productive sputum during this time. He states he is now short of breath even walking to his driveway. He does not use O2 at home. He is a current 1.5ppd smoker.

## 2018-05-13 NOTE — PLAN OF CARE
Problem: Patient Care Overview  Goal: Plan of Care Review  Outcome: Ongoing (interventions implemented as appropriate)  Kept on oxygen supplement at 2lpm via NC, saturations at >93%. Coughing still noted, activity adjusted per tolerance.  VS stable, no spiking of temperature. Medications continued. Safety maintained: bed at low and locked position, call bell within reach, falls risk band on, non skid socks on. Promoted rest and sleep.

## 2018-05-13 NOTE — SUBJECTIVE & OBJECTIVE
Interval History: Patient resting in bed, no acute complaints. Saturations 89-91% on room air.    Review of Systems   Constitutional: Negative for activity change, appetite change, chills, fatigue and fever.   HENT: Positive for congestion. Negative for trouble swallowing and voice change.    Eyes: Negative for photophobia and visual disturbance.   Respiratory: Positive for cough, shortness of breath and wheezing. Negative for choking.    Cardiovascular: Negative for chest pain and leg swelling.   Gastrointestinal: Negative for abdominal pain, anal bleeding, constipation and nausea.   Genitourinary: Negative for difficulty urinating and dysuria.   Musculoskeletal: Negative for back pain and gait problem.   Skin: Negative for color change.   Allergic/Immunologic: Positive for immunocompromised state.   Neurological: Negative for dizziness, seizures, facial asymmetry, weakness and numbness.   Psychiatric/Behavioral: Negative for agitation.     Objective:     Vital Signs (Most Recent):  Temp: 97.8 °F (36.6 °C) (05/13/18 1227)  Pulse: 70 (05/13/18 1642)  Resp: 20 (05/13/18 1642)  BP: 125/79 (05/13/18 1227)  SpO2: (!) 93 % (05/13/18 1642) Vital Signs (24h Range):  Temp:  [97.7 °F (36.5 °C)-98.6 °F (37 °C)] 97.8 °F (36.6 °C)  Pulse:  [53-90] 70  Resp:  [16-20] 20  SpO2:  [86 %-95 %] 93 %  BP: (125-138)/(73-88) 125/79     Weight: 90.7 kg (200 lb)  Body mass index is 27.89 kg/m².    Intake/Output Summary (Last 24 hours) at 05/13/18 1654  Last data filed at 05/13/18 0600   Gross per 24 hour   Intake             1100 ml   Output             1930 ml   Net             -830 ml      Physical Exam   Constitutional: He is oriented to person, place, and time. He appears well-nourished. No distress.   HENT:   Head: Normocephalic and atraumatic.   Eyes: Pupils are equal, round, and reactive to light.   Neck: Normal range of motion.   Cardiovascular: Normal rate and regular rhythm.    No murmur heard.  Pulmonary/Chest: Effort normal.  He has wheezes (expiratory ). He has no rales.   Coarse breath sounds    Abdominal: Soft. Bowel sounds are normal. He exhibits no distension.   Musculoskeletal: Normal range of motion. He exhibits no edema.   Neurological: He is alert and oriented to person, place, and time. No cranial nerve deficit. Coordination normal.   Skin: Skin is warm and dry.   Psychiatric: He has a normal mood and affect.   Nursing note and vitals reviewed.      Significant Labs: All pertinent labs within the past 24 hours have been reviewed.    Significant Imaging: I have reviewed all pertinent imaging results/findings within the past 24 hours.

## 2018-05-13 NOTE — PROGRESS NOTES
Patient slept on and off overnight, frequent coughing noted, sputum is thick and yellow, patient expectorating without problem,  kept on 2 litres oxygen.  Voiding large amount of urine, drinking liberal amount of water.

## 2018-05-13 NOTE — PROGRESS NOTES
Home Oxygen Evaluation    Date Performed: 5/13/2018    1) Patient's Home O2 Sat on room air, while at rest:  88%        If O2 sats on room air at rest are 88% or below, patient qualifies. No additional testing needed. Document N/A in steps 2 and 3. If 89% or above, complete steps 2.      2) Patient's O2 Sat on room air while exercising: N/A       If O2 sats on room air while exercising remain 89% or above patient does not qualify, no further testing needed Document N/A in step 3. If O2 sats on room air while exercising are 88% or below, continue to step 3.      3) Patient's O2 Sat while exercising on O2: N/A      (Must show improvement from #2 for patients to qualify)    If O2 sats improve on oxygen, patient qualifies for portable oxygen. If not, the patient does not qualify.

## 2018-05-13 NOTE — PLAN OF CARE
Problem: Patient Care Overview  Goal: Plan of Care Review  Outcome: Ongoing (interventions implemented as appropriate)  Pt's VSS on 2L O2 via NC, NAD noted. Pt ambulates to bathroom independently. Six minute walk test completed and pt qualifies for home o2. Bed locked in lowest position, side rails upx2, call bell within reach, nonskid socks on.

## 2018-05-13 NOTE — PROGRESS NOTES
[facility] Hospital Medicine  Progress Note    Patient Name: Kevin Crews  MRN: [id number]  Patient Class: OP- Observation   Admission Date: 5/11/2018  Length of Stay: 0 days  Attending Physician: Mahogany Li MD  Primary Care Provider: No primary care provider on file.    Hospital Medicine [place] [facility] [place] Carlo Reese PA-C    Subjective:     Principal Problem:Acute hypoxemic respiratory failure    HPI:  Mr. Kevin Crews is a 62 y.o. male with COPD, CAD s/p stent, BPH, and history of CVA with left sided weakness who presents as transfer from [facility] ED for COPD exacerbation vs pneumonia. He states that since Monday, he has been developing worsening shortness of breath and wheezing. Says that initially he felt like his throat was swollen, although this has improved now. Does report two intermittent episodes of pleuritic type chest pain. Additionally, he has had productive sputum during this time. He states he is now short of breath even walking to his driveway. He does not use O2 at home. He is a current 1.5ppd smoker.    Hospital Course:  Kevin Crews was placed in observation for acute COPD exacerbation with possible pneumonia. CXR demonstrated COPD, right basilar atelectasis vs vascular crowding. Steroids and antibiotics administered. 6 minute walk test ordered.    Interval History: Patient resting in bed, no acute complaints. Saturations 89-91% on room air.    Review of Systems   Constitutional: Negative for activity change, appetite change, chills, fatigue and fever.   HENT: Positive for congestion. Negative for trouble swallowing and voice change.    Eyes: Negative for photophobia and visual disturbance.   Respiratory: Positive for cough, shortness of breath and wheezing. Negative for choking.    Cardiovascular: Negative for chest pain and leg swelling.   Gastrointestinal: Negative for abdominal pain, anal bleeding, constipation and nausea.   Genitourinary: Negative  for difficulty urinating and dysuria.   Musculoskeletal: Negative for back pain and gait problem.   Skin: Negative for color change.   Allergic/Immunologic: Positive for immunocompromised state.   Neurological: Negative for dizziness, seizures, facial asymmetry, weakness and numbness.   Psychiatric/Behavioral: Negative for agitation.     Objective:     Vital Signs (Most Recent):  Temp: 97.8 °F (36.6 °C) (05/13/18 1227)  Pulse: 70 (05/13/18 1642)  Resp: 20 (05/13/18 1642)  BP: 125/79 (05/13/18 1227)  SpO2: (!) 93 % (05/13/18 1642) Vital Signs (24h Range):  Temp:  [97.7 °F (36.5 °C)-98.6 °F (37 °C)] 97.8 °F (36.6 °C)  Pulse:  [53-90] 70  Resp:  [16-20] 20  SpO2:  [86 %-95 %] 93 %  BP: (125-138)/(73-88) 125/79     Weight: 90.7 kg (200 lb)  Body mass index is 27.89 kg/m².    Intake/Output Summary (Last 24 hours) at 05/13/18 1654  Last data filed at 05/13/18 0600   Gross per 24 hour   Intake             1100 ml   Output             1930 ml   Net             -830 ml      Physical Exam   Constitutional: He is oriented to person, place, and time. He appears well-nourished. No distress.   HENT:   Head: Normocephalic and atraumatic.   Eyes: Pupils are equal, round, and reactive to light.   Neck: Normal range of motion.   Cardiovascular: Normal rate and regular rhythm.    No murmur heard.  Pulmonary/Chest: Effort normal. He has wheezes (expiratory ). He has no rales.   Coarse breath sounds    Abdominal: Soft. Bowel sounds are normal. He exhibits no distension.   Musculoskeletal: Normal range of motion. He exhibits no edema.   Neurological: He is alert and oriented to person, place, and time. No cranial nerve deficit. Coordination normal.   Skin: Skin is warm and dry.   Psychiatric: He has a normal mood and affect.   Nursing note and vitals reviewed.      Significant Labs: All pertinent labs within the past 24 hours have been reviewed.    Significant Imaging: I have reviewed all pertinent imaging results/findings within the  past 24 hours.    Assessment/Plan:      * Acute hypoxemic respiratory failure    COPD   6 minute walk test today  - expiratory wheezing on exam, Spo2 91-94% on 2L NC, 89% on RA  - CXR with with atelectasis and vascular crowding, no fever since admit  - continue Prednisone 40mg  - DuoNebs Q4H  - sputum culture/gram stain in process        CAP (community acquired pneumonia)    - See above  - afebrile since admit  - neutropenic precautions in place  - continue moxifloxacin tablet 400 mg         RUDI (acute kidney injury)    Resolving 2.4-->1.9--> 1.0 (baseline unknown)  - avoid nephrotoxins   - IVF        Hypokalemia    Improved with supplementation on admit, 3.8  - will continue to monitor        Tobacco dependence    Daily smoker, 1.5ppd  - education provided regarding smoking cessation, patient verbalized understanding   - nicotine patch in place          Essential hypertension    Controlled   - lisinopril (PRINIVIL,ZESTRIL) 40 MG tablet  - amLODIPine (NORVASC) 10 MG tablet          VTE Risk Mitigation         Ordered     heparin (porcine) injection 5,000 Units  Every 8 hours      05/11/18 1943     IP VTE HIGH RISK PATIENT  Once      05/11/18 1943              Carlo Reese PA-C  Department of Hospital Medicine   [facility]

## 2018-05-13 NOTE — ASSESSMENT & PLAN NOTE
COPD   6 minute walk test today  - expiratory wheezing on exam, Spo2 91-94% on 2L NC, 89% on RA  - CXR with with atelectasis and vascular crowding, no fever since admit  - continue Prednisone 40mg  - DuoNebs Q4H  - sputum culture/gram stain in process

## 2018-05-14 NOTE — PLAN OF CARE
Problem: Patient Care Overview  Goal: Plan of Care Review  Outcome: Ongoing (interventions implemented as appropriate)  Patient supine in bed. HOB elevated. Patient alert and responsive. Patient has O2 at 2L/NC  Tolerating well. Denies any complaint of pain or discomfort at this time.

## 2018-05-14 NOTE — PLAN OF CARE
Problem: Patient Care Overview  Goal: Plan of Care Review  Outcome: Ongoing (interventions implemented as appropriate)  Progressing towards goal, WBC count improving slowly, reverse barrier nursing neutropenic precaution continued. Still has productive cough, more loose and easy to expectorate.  Denies pain, still SOB on excertion, oxygen via NC maintained. Personal hygiene well maintained. Ambulating around the room slowly. Promoted rest and sleep. Falls precaution maintained.

## 2018-05-14 NOTE — SUBJECTIVE & OBJECTIVE
Interval History: No reported events overnight. Wheezing mildly improved.     Review of Systems   Constitutional: Negative for activity change, appetite change, chills, fatigue and fever.   HENT: Positive for congestion. Negative for trouble swallowing and voice change.    Eyes: Negative for photophobia and visual disturbance.   Respiratory: Positive for cough, shortness of breath and wheezing. Negative for choking.    Cardiovascular: Negative for chest pain and leg swelling.   Gastrointestinal: Negative for abdominal pain, anal bleeding, constipation and nausea.   Genitourinary: Negative for difficulty urinating and dysuria.   Musculoskeletal: Negative for back pain and gait problem.   Skin: Negative for color change.   Allergic/Immunologic: Positive for immunocompromised state.   Neurological: Negative for dizziness, seizures, facial asymmetry, weakness and numbness.   Psychiatric/Behavioral: Negative for agitation.     Objective:     Vital Signs (Most Recent):  Temp: 96.8 °F (36 °C) (05/14/18 1209)  Pulse: 79 (05/14/18 1209)  Resp: 16 (05/14/18 1209)  BP: 129/81 (05/14/18 1209)  SpO2: (!) 94 % (05/14/18 1209) Vital Signs (24h Range):  Temp:  [96.4 °F (35.8 °C)-98.5 °F (36.9 °C)] 96.8 °F (36 °C)  Pulse:  [64-79] 79  Resp:  [16-20] 16  SpO2:  [92 %-97 %] 94 %  BP: (129-145)/(79-96) 129/81     Weight: 90.7 kg (200 lb)  Body mass index is 27.89 kg/m².    Intake/Output Summary (Last 24 hours) at 05/14/18 1405  Last data filed at 05/14/18 1100   Gross per 24 hour   Intake             1120 ml   Output             1645 ml   Net             -525 ml      Physical Exam   Constitutional: He is oriented to person, place, and time. He appears well-nourished. No distress.   HENT:   Head: Normocephalic and atraumatic.   Eyes: Pupils are equal, round, and reactive to light.   Neck: Normal range of motion.   Cardiovascular: Normal rate and regular rhythm.    No murmur heard.  Pulmonary/Chest: Effort normal. He has wheezes  (expiratory ). He has no rales.   Coarse breath sounds    Abdominal: Soft. Bowel sounds are normal. He exhibits no distension.   Musculoskeletal: Normal range of motion. He exhibits no edema.   Neurological: He is alert and oriented to person, place, and time. No cranial nerve deficit. Coordination normal.   Skin: Skin is warm and dry.   Psychiatric: He has a normal mood and affect.   Nursing note and vitals reviewed.      Significant Labs: All pertinent labs within the past 24 hours have been reviewed.    Significant Imaging: I have reviewed all pertinent imaging results/findings within the past 24 hours.

## 2018-05-14 NOTE — ASSESSMENT & PLAN NOTE
- See above  - temperature improved  - neutropenic precautions in place  - continue moxifloxacin tablet 400 mg

## 2018-05-14 NOTE — PROGRESS NOTES
[facility] Hospital Medicine  Progress Note    Patient Name: Kevin Crews  MRN: [id number]  Patient Class: IP- Inpatient   Admission Date: 5/11/2018  Length of Stay: 0 days  Attending Physician: Mahogany Li MD  Primary Care Provider: No primary care provider on file.    Hospital Medicine Team: [facility] [place] [place] Carlo Reese PA-C    Subjective:     Principal Problem:Acute hypoxemic respiratory failure    HPI:  Mr. Kevin Crews is a 62 y.o. male with COPD, CAD s/p stent, BPH, and history of CVA with left sided weakness who presents as transfer from [facility] ED for COPD exacerbation vs pneumonia. He states that since Monday, he has been developing worsening shortness of breath and wheezing. Says that initially he felt like his throat was swollen, although this has improved now. Does report two intermittent episodes of pleuritic type chest pain. Additionally, he has had productive sputum during this time. He states he is now short of breath even walking to his driveway. He does not use O2 at home. He is a current 1.5ppd smoker.    Hospital Course:  Kevin Crews was placed in observation for acute COPD exacerbation with possible pneumonia. CXR demonstrated COPD, right basilar atelectasis vs vascular crowding. Steroids and antibiotics administered. Patient qualified for home oxygen per 6 minute walk test. On exam, patient with persistent wheezing, will continue scheduled nebs.     Interval History: No reported events overnight. Wheezing mildly improved.     Review of Systems   Constitutional: Negative for activity change, appetite change, chills, fatigue and fever.   HENT: Positive for congestion. Negative for trouble swallowing and voice change.    Eyes: Negative for photophobia and visual disturbance.   Respiratory: Positive for cough, shortness of breath and wheezing. Negative for choking.    Cardiovascular: Negative for chest pain and leg swelling.   Gastrointestinal: Negative for  abdominal pain, anal bleeding, constipation and nausea.   Genitourinary: Negative for difficulty urinating and dysuria.   Musculoskeletal: Negative for back pain and gait problem.   Skin: Negative for color change.   Allergic/Immunologic: Positive for immunocompromised state.   Neurological: Negative for dizziness, seizures, facial asymmetry, weakness and numbness.   Psychiatric/Behavioral: Negative for agitation.     Objective:     Vital Signs (Most Recent):  Temp: 96.8 °F (36 °C) (05/14/18 1209)  Pulse: 79 (05/14/18 1209)  Resp: 16 (05/14/18 1209)  BP: 129/81 (05/14/18 1209)  SpO2: (!) 94 % (05/14/18 1209) Vital Signs (24h Range):  Temp:  [96.4 °F (35.8 °C)-98.5 °F (36.9 °C)] 96.8 °F (36 °C)  Pulse:  [64-79] 79  Resp:  [16-20] 16  SpO2:  [92 %-97 %] 94 %  BP: (129-145)/(79-96) 129/81     Weight: 90.7 kg (200 lb)  Body mass index is 27.89 kg/m².    Intake/Output Summary (Last 24 hours) at 05/14/18 1405  Last data filed at 05/14/18 1100   Gross per 24 hour   Intake             1120 ml   Output             1645 ml   Net             -525 ml      Physical Exam   Constitutional: He is oriented to person, place, and time. He appears well-nourished. No distress.   HENT:   Head: Normocephalic and atraumatic.   Eyes: Pupils are equal, round, and reactive to light.   Neck: Normal range of motion.   Cardiovascular: Normal rate and regular rhythm.    No murmur heard.  Pulmonary/Chest: Effort normal. He has wheezes (expiratory ). He has no rales.   Coarse breath sounds    Abdominal: Soft. Bowel sounds are normal. He exhibits no distension.   Musculoskeletal: Normal range of motion. He exhibits no edema.   Neurological: He is alert and oriented to person, place, and time. No cranial nerve deficit. Coordination normal.   Skin: Skin is warm and dry.   Psychiatric: He has a normal mood and affect.   Nursing note and vitals reviewed.      Significant Labs: All pertinent labs within the past 24 hours have been  reviewed.    Significant Imaging: I have reviewed all pertinent imaging results/findings within the past 24 hours.    Assessment/Plan:      * Acute hypoxemic respiratory failure    COPD exacerbation  Coarse breath sounds improved, will continue to monitor  - patient qualifies for home oxygen per 6 minute walk test  - continued expiratory wheezing on exam, Spo2 91-94% on 2L NC, 89% on RA  - CXR with with atelectasis and vascular crowding, no fever since admit  - continue Prednisone 40mg  - DuoNebs Q4H        CAP (community acquired pneumonia)    - See above  - temperature improved  - neutropenic precautions in place  - continue moxifloxacin tablet 400 mg         RUDI (acute kidney injury)    Resolving 2.4-->1.9--> 1.0 (baseline unknown)  - avoid nephrotoxins   - IVF        Hypokalemia    Stable.  - Improved with supplementation on admit, 3.8  - will continue to monitor        Tobacco dependence    Daily smoker, 1.5ppd  - education provided regarding smoking cessation, patient verbalized understanding   - nicotine patch in place          Essential hypertension    Controlled   - lisinopril (PRINIVIL,ZESTRIL) 40 MG tablet  - amLODIPine (NORVASC) 10 MG tablet          VTE Risk Mitigation         Ordered     heparin (porcine) injection 5,000 Units  Every 8 hours      05/11/18 1943     IP VTE HIGH RISK PATIENT  Once      05/11/18 1943              Carlo Reese PA-C  Department of Hospital Medicine   [facility]

## 2018-05-14 NOTE — ASSESSMENT & PLAN NOTE
COPD exacerbation  Coarse breath sounds improved, will continue to monitor  - patient qualifies for home oxygen per 6 minute walk test  - continued expiratory wheezing on exam, Spo2 91-94% on 2L NC, 89% on RA  - CXR with with atelectasis and vascular crowding, no fever since admit  - continue Prednisone 40mg  - DuoNebs Q4H

## 2018-05-14 NOTE — PLAN OF CARE
05/14/18 1050   Discharge Assessment   Assessment Type Discharge Planning Assessment   Confirmed/corrected address and phone number on facesheet? Yes   Assessment information obtained from? Patient   Expected Length of Stay (days) 3   Communicated expected length of stay with patient/caregiver yes   Prior to hospitilization cognitive status: Alert/Oriented   Prior to hospitalization functional status: Assistive Equipment   Current cognitive status: Alert/Oriented   Current Functional Status: Needs Assistance   Lives With sibling(s)  (brother, Fco Crews ([phone]))   Able to Return to Prior Arrangements yes   Is patient able to care for self after discharge? Yes   Patient's perception of discharge disposition home or selfcare   Readmission Within The Last 30 Days no previous admission in last 30 days   Patient currently being followed by outpatient case management? No   Patient currently receives any other outside agency services? No   Equipment Currently Used at Home cane, straight;shower chair   Do you have any problems affording any of your prescribed medications? No   Is the patient taking medications as prescribed? yes   Does the patient have transportation home? Yes   Transportation Available family or friend will provide   Does the patient receive services at the [facility]? No   Discharge Plan A Home with family   Discharge Plan B Home Health   Patient/Family In Agreement With Plan yes     Dx: acute hypoxemic respiratory failure   Pharm: [facility]  Hosp f/u appt: Dr. Fco Nielsen (pulm at [facility]) on 6/5/18 at 1420

## 2018-05-15 NOTE — PLAN OF CARE
Problem: Patient Care Overview  Goal: Plan of Care Review  Outcome: Ongoing (interventions implemented as appropriate)  Stable night and restful night. Safety maintained, no complaints of pain.   No fall occurrence. Kept on oxygen via NC, satting at >95%.

## 2018-05-15 NOTE — DISCHARGE SUMMARY
[facility] Hospital Medicine  Discharge Summary      Patient Name: Kevin Crews  MRN: [id number]  Admission Date: 5/11/2018  Hospital Length of Stay: 1 days  Discharge Date and Time: 5/15/2018  2:46 PM  Attending Physician: Krissy Quiroga MD   Discharging Provider: Marquita Lee PA-C  Primary Care Provider: [facility] Medicine Team: [facility] HOSP MED E Marquita Lee PA-C    HPI:   Mr. Kevin Crews is a 62 y.o. male with COPD, CAD s/p stent, BPH, and history of CVA with left sided weakness who presents as transfer from [facility] ED for COPD exacerbation vs pneumonia. He states that since Monday, he has been developing worsening shortness of breath and wheezing. Says that initially he felt like his throat was swollen, although this has improved now. Does report two intermittent episodes of pleuritic type chest pain. Additionally, he has had productive sputum during this time. He states he is now short of breath even walking to his driveway. He does not use O2 at home. He is a current 1.5ppd smoker.    * No surgery found *      Hospital Course:   Kevin Crews was placed in observation for acute COPD exacerbation with possible pneumonia. CXR demonstrated COPD, right basilar atelectasis vs vascular crowding. Steroids and antibiotics administered. Patient qualified for home oxygen per 6 minute walk test. Patient improved with steroids, scheduled nebs. Patient stable for discharge with home O2.      Consults:     * Acute hypoxemic respiratory failure    COPD exacerbation  Coarse breath sounds improved, will continue to monitor  - patient qualifies for home oxygen per 6 minute walk test  - continued expiratory wheezing on exam, Spo2 91-94% on 2L NC, 89% on RA  - CXR with with atelectasis and vascular crowding, no fever since admit  - continue Prednisone 40mg  - DuoNebs Q4H  - will continue Levaquin on discharge for a total of 10 days        CAP (community acquired  pneumonia)    - See above  - temperature improved  - neutropenic precautions in place  - continue moxifloxacin tablet 400 mg  - will transition to levaquin on dc for total of 10 days        Acute renal failure    Resolving 2.4-->1.9--> 1.0 (baseline unknown)  - avoid nephrotoxins   - IVF        Hypokalemia    Stable.  - Improved with supplementation on admit, 3.8  - will continue to monitor        Tobacco dependence    Daily smoker, 1.5ppd  - education provided regarding smoking cessation, patient verbalized understanding   - nicotine patch in place          Essential hypertension    Controlled   - lisinopril (PRINIVIL,ZESTRIL) 40 MG tablet  - amLODIPine (NORVASC) 10 MG tablet          Final Active Diagnoses:    Diagnosis Date Noted POA    PRINCIPAL PROBLEM:  Acute hypoxemic respiratory failure [J96.01] 05/11/2018 Yes    COPD exacerbation [J44.1] 05/11/2018 Yes    Essential hypertension [I10] 05/11/2018 Yes    Tobacco dependence [F17.200] 05/11/2018 Yes    Hypokalemia [E87.6] 05/11/2018 Yes    History of CVA (cerebrovascular accident) [Z86.73] 05/11/2018 Not Applicable    Acute renal failure [N17.9] 05/11/2018 Yes    Coronary artery disease involving native coronary artery of native heart without angina pectoris [I25.10] 05/11/2018 Yes    CAP (community acquired pneumonia) [J18.9] 05/11/2018 Yes      Problems Resolved During this Admission:    Diagnosis Date Noted Date Resolved POA       Discharged Condition: good    Disposition: Home or Self Care    Follow Up:  Follow-up Information     Fco Nielsen MD On 6/5/2018.    Specialties:  Pulmonary Disease, Critical Care Medicine  Why:  at 2:20pm  Contact information:  [address]  [address]  [place] [address]  [phone]             [facility] HEMATOLOGY/ONCOLOGY On 5/18/2018.    Specialty:  Hematology and Oncology  Why:  Hematology/Oncology nurse to call the patient with appointment date & time.  Contact information:  [address]  [place]  "[place] [address]  [phone]               Patient Instructions:     OXYGEN FOR HOME USE   Order Specific Question Answer Comments   Liter Flow 2    Duration Continuous    Qualifying SpO2: 88    Testing done at: Rest    Route nasal cannula    Device home concentrator with portable unit    Length of need (in months): 99 mos    Patient condition with qualifying saturation COPD    Height: 5' 11" (1.803 m)    Weight: 90.7 kg (200 lb)    Does patient have medical equipment at home? cane, straight    Does patient have medical equipment at home? shower chair    Alternative treatment measures have been tried or considered and deemed clinically ineffective. Yes      Ambulatory Referral to Hematology / Oncology   Referral Priority: Routine Referral Type: Consultation   Referral Reason: Specialty Services Required    Requested Specialty: Hematology and Oncology    Number of Visits Requested: 1      Diet Cardiac     Activity as tolerated     Notify your health care provider if you experience any of the following:  persistent nausea and vomiting or diarrhea     Notify your health care provider if you experience any of the following:  difficulty breathing or increased cough     Notify your health care provider if you experience any of the following:  persistent dizziness, light-headedness, or visual disturbances     Notify your health care provider if you experience any of the following:  increased confusion or weakness         Significant Diagnostic Studies: Labs:   CMP   Recent Labs  Lab 05/14/18  0431 05/15/18  0441    141   K 3.8 3.7    99   CO2 34* 35*   GLU 93 96   BUN 17 13   CREATININE 0.9 0.8   CALCIUM 9.0 9.0   ANIONGAP 8 7*   ESTGFRAFRICA >60.0 >60.0   EGFRNONAA >60.0 >60.0    and CBC   Recent Labs  Lab 05/14/18  0431 05/15/18  0441   WBC 2.25* 1.84*   HGB 11.3* 11.0*   HCT 35.0* 34.1*    166       Pending Diagnostic Studies:     None         Medications:  Reconciled Home Medications:    "   Medication List      START taking these medications    aspirin 81 MG Chew  Take 1 tablet (81 mg total) by mouth once daily.  Start taking on:  5/16/2018     fluticasone-vilanterol 100-25 mcg/dose diskus inhaler  Commonly known as:  BREO  Inhale 1 puff into the lungs once daily. Controller  Start taking on:  5/16/2018     guaiFENesin 600 mg 12 hr tablet  Commonly known as:  MUCINEX  Take 1 tablet (600 mg total) by mouth 2 (two) times daily.     predniSONE 20 MG tablet  Commonly known as:  DELTASONE  Take 2 tablets (40 mg total) by mouth once daily.  Start taking on:  5/16/2018        CHANGE how you take these medications    albuterol 90 mcg/actuation inhaler  Commonly known as:  PROVENTIL HFA  Inhale 1-2 puffs into the lungs every 4 (four) hours as needed for Wheezing or Shortness of Breath.  What changed:  · medication strength  · how much to take  · when to take this  · reasons to take this        CONTINUE taking these medications    amLODIPine 10 MG tablet  Commonly known as:  NORVASC  Take 10 mg by mouth once daily.     atorvastatin 40 MG tablet  Commonly known as:  LIPITOR  Take 40 mg by mouth once daily.     carvedilol 25 MG tablet  Commonly known as:  COREG  Take 25 mg by mouth 2 (two) times daily with meals.     hydroCHLOROthiazide 25 MG tablet  Commonly known as:  HYDRODIURIL  Take 25 mg by mouth once daily.     ipratropium 17 mcg/actuation inhaler  Commonly known as:  ATROVENT HFA  Inhale 2 puffs into the lungs every 6 (six) hours. Rescue     lisinopril 40 MG tablet  Commonly known as:  PRINIVIL,ZESTRIL  Take 40 mg by mouth once daily.     traZODone 50 MG tablet  Commonly known as:  DESYREL  Take 50 mg by mouth every evening.            Indwelling Lines/Drains at time of discharge:   Lines/Drains/Airways          No matching active lines, drains, or airways          Time spent on the discharge of patient: 36 minutes  Patient was seen and examined on the date of discharge and determined to be suitable for  discharge.         Marquita Lee PA-C  Department of Hospital Medicine  [facility]

## 2018-05-15 NOTE — ASSESSMENT & PLAN NOTE
- See above  - temperature improved  - neutropenic precautions in place  - continue moxifloxacin tablet 400 mg  - will transition to levaquin on dc for total of 10 days

## 2018-05-15 NOTE — ASSESSMENT & PLAN NOTE
COPD exacerbation  Coarse breath sounds improved, will continue to monitor  - patient qualifies for home oxygen per 6 minute walk test  - continued expiratory wheezing on exam, Spo2 91-94% on 2L NC, 89% on RA  - CXR with with atelectasis and vascular crowding, no fever since admit  - continue Prednisone 40mg  - DuoNebs Q4H  - will continue Levaquin on discharge for a total of 10 days

## 2018-05-15 NOTE — NURSING
Patient discharged to home via wheelchair. Discharge instructions given. Prescriptions given. Vital signs within normal limits. Patient denies any complaint or discomfort or distress at this time.

## 2018-05-15 NOTE — PROGRESS NOTES
Slept well overnight. Plan for discharge today with home oxygen. Education with teach back on home oxygen done. Patient demonstrated good understanding of safety precautions when  using oxygen at home.

## 2018-05-15 NOTE — PROGRESS NOTES
Paged Rosalinda BARAJAS responded, I notified her of patient's  WBC count from this morning's lab :1.9 ( down from 2.25 yesterday) . No order given this time.

## 2018-05-16 NOTE — PLAN OF CARE
05/16/18 0709   Final Note   Assessment Type Final Discharge Note     Patient discharged home with portable O2 on 5/15/18. Discharge summary faxed to Dr. Fco Nilesen ([facility]) f [phone].

## 2018-09-11 NOTE — PROGRESS NOTES
HPI:  Kevin Crews is a 62 y.o. year old male that  presents to become established.He has had a recent weight loss and some gum bleeding.  He has history of prostate cancer  Chief Complaint   Patient presents with    Medication Refill    Establish Care   .     HPI    Past Medical History:   Diagnosis Date    COPD (chronic obstructive pulmonary disease)     Heart attack 2010    Hypertension     Prostate cancer     Stroke      Social History     Socioeconomic History    Marital status:      Spouse name: Not on file    Number of children: Not on file    Years of education: Not on file    Highest education level: Not on file   Social Needs    Financial resource strain: Not on file    Food insecurity - worry: Not on file    Food insecurity - inability: Not on file    Transportation needs - medical: Not on file    Transportation needs - non-medical: Not on file   Occupational History    Not on file   Tobacco Use    Smoking status: Current Every Day Smoker     Packs/day: 1.00     Years: 40.00     Pack years: 40.00     Types: Cigarettes    Smokeless tobacco: Never Used   Substance and Sexual Activity    Alcohol use: Yes     Comment: RARELY    Drug use: Yes     Types: Cocaine     Comment: HAVEN'T USED IN 2 WEEKS    Sexual activity: Not on file   Other Topics Concern    Not on file   Social History Narrative    Not on file     Past Surgical History:   Procedure Laterality Date    CORONARY ANGIOPLASTY WITH STENT PLACEMENT      EYE SURGERY      PROSTATE SURGERY       History reviewed. No pertinent family history.        Review of Systems  General ROS: negative for chills, fever, pos weight loss  Psychological ROS: negative for hallucination, depression or suicidal ideation  Ophthalmic ROS: negative for blurry vision, photophobia or eye pain  ENT ROS: negative for epistaxis, sore throat or rhinorrhea,gum bleeding  Respiratory ROS: no cough, shortness of breath, or wheezing  Cardiovascular  "ROS: no chest pain or dyspnea on exertion  Gastrointestinal ROS: no abdominal pain, change in bowel habits, or black/ bloody stools  Genito-Urinary ROS: no dysuria, trouble voiding, or hematuria  Musculoskeletal ROS: negative for gait disturbance or muscular weakness  Neurological ROS: no syncope or seizures; no ataxia  Dermatological ROS: negative for pruritis, rash and jaundice      Physical Exam:  /68   Pulse 73   Temp 98.6 °F (37 °C) (Oral)   Resp 18   Ht 5' 11" (1.803 m)   Wt 93.9 kg (207 lb)   SpO2 95%   BMI 28.87 kg/m²   General appearance: alert, cooperative, no distress  Constitutional:Oriented to person, place, and time.appears well-developed and well-nourished.  HEENT: Normocephalic, atraumatic, neck symmetrical, no nasal discharge, TM - clear bilaterally ,tition poor den  Eyes: conjunctivae/corneas clear, PERRL, EOM's intact  Lungs: clear to auscultation bilaterally, no dullness to percussion bilaterally  Heart: regular rate and rhythm without rub; no displacement of the PMI   Abdomen: soft, non-tender; bowel sounds normoactive; no organomegaly  Extremities: extremities symmetric; no clubbing, cyanosis, or edema  Integument: Skin color, texture, turgor normal; no rashes; hair distrubution normal  Neurologic: Alert and oriented X 3, normal strength, normal coordination and gait  Psychiatric: no pressured speech; normal affect; no evidence of impaired cognition   Physical Exam  LABS:    Complete Blood Count  Lab Results   Component Value Date    RBC 2.17 (L) 09/15/2018    HGB 6.8 (L) 09/15/2018    HCT 21.6 (L) 09/15/2018     (H) 09/15/2018    MCH 31.3 (H) 09/15/2018    MCHC 31.5 (L) 09/15/2018    RDW 19.6 (H) 09/15/2018    PLT 25 (LL) 09/15/2018    MPV 11.5 09/15/2018    GRAN 0.0 (L) 09/15/2018    LYMPH 28.0 09/15/2018    MONO 0.0 (L) 09/15/2018    EOS CANCELED 09/14/2018    BASO CANCELED 09/14/2018    EOSINOPHIL 0.0 09/15/2018    BASOPHIL 0.0 09/15/2018    DIFFMETHOD Manual 09/15/2018 "       Comprehensive Metabolic Panel  Lab Results   Component Value Date     (H) 09/15/2018    BUN 12 09/15/2018    CREATININE 1.0 09/15/2018     09/15/2018    K 3.9 09/15/2018     09/15/2018    PROT 6.2 09/15/2018    ALBUMIN 2.6 (L) 09/15/2018    BILITOT 0.4 09/15/2018    AST 69 (H) 09/15/2018    ALKPHOS 64 09/15/2018    CO2 25 09/15/2018    ALT 86 (H) 09/15/2018    ANIONGAP 9 09/15/2018    EGFRNONAA >60.0 09/15/2018    ESTGFRAFRICA >60.0 09/15/2018       LIPID  Lab Results   Component Value Date    CHOL 91 (L) 09/13/2018    HDL 15 (L) 09/13/2018       TSH  Lab Results   Component Value Date    TSH 1.857 09/13/2018       No current facility-administered medications for this visit.      No current outpatient medications on file.     Facility-Administered Medications Ordered in Other Visits   Medication Dose Route Frequency Provider Last Rate Last Dose    0.9%  NaCl infusion (for blood administration)   Intravenous Q24H PRN Papito Simpson MD        0.9%  NaCl infusion   Intravenous Continuous Skyla Benitez MD 75 mL/hr at 09/15/18 1330      acetaminophen tablet 500 mg  500 mg Oral Q6H PRN Mary Edwards MD        acyclovir capsule 400 mg  400 mg Oral BID Skyla Benitez MD   400 mg at 09/15/18 2049    albuterol-ipratropium 2.5 mg-0.5 mg/3 mL nebulizer solution 3 mL  3 mL Nebulization Q4H PRN Papito Simpson MD   3 mL at 09/15/18 2149    allopurinol tablet 300 mg  300 mg Oral Daily Skyla Benitez MD   300 mg at 09/15/18 0902    azithromycin tablet 250 mg  250 mg Oral Daily Papito Simpson MD   250 mg at 09/15/18 1201    ceFEPIme injection 2 g  2 g Intravenous Q8H Mary Edwards MD   2 g at 09/15/18 1648    fentaNYL injection 50 mcg  50 mcg Intravenous On Call Procedure Re Rojas, LEXX        fluconazole tablet 400 mg  400 mg Oral Daily Skyla Benitez MD   400 mg at 09/15/18 0902    fluticasone-vilanterol 100-25 mcg/dose diskus inhaler 1 puff  1 puff Inhalation Daily Carlin Marie,  MD   1 puff at 09/15/18 1023    influenza (FLUZONE QUADRIVALENT) vaccine 0.5 mL  0.5 mL Intramuscular Prior to discharge Marlyn Ang MD        lidocaine HCL 10 mg/ml (1%) injection 20 mL  20 mL Intradermal On Call Procedure Re Rojas, LEXX        ondansetron disintegrating tablet 4 mg  4 mg Oral Q6H PRN Mary Edwards MD        ramelteon tablet 8 mg  8 mg Oral Nightly PRN Carlin Marie MD        sodium chloride 0.9% flush 5 mL  5 mL Intravenous PRN Carlin Marie MD           Assessment:    ICD-10-CM ICD-9-CM    1. Annual physical exam Z00.00 V70.0 Comprehensive metabolic panel      Lipid panel      CBC auto differential      TSH   2. Essential hypertension I10 401.9 Ambulatory Referral to Cardiology      lisinopril (PRINIVIL,ZESTRIL) 40 MG tablet      hydroCHLOROthiazide (HYDRODIURIL) 25 MG tablet      amLODIPine (NORVASC) 10 MG tablet   3. Chronic obstructive pulmonary disease, unspecified COPD type J44.9 496 albuterol (PROVENTIL HFA) 90 mcg/actuation inhaler      ipratropium (ATROVENT HFA) 17 mcg/actuation inhaler   4. Tobacco dependence F17.200 305.1 Ambulatory referral to Smoking Cessation Program   5. Coronary artery disease involving native coronary artery of native heart without angina pectoris I25.10 414.01    6. History of CVA (cerebrovascular accident) Z86.73 V12.54    7. History of prostate cancer Z85.46 V10.46 Ambulatory Referral to Urology   8. Erectile dysfunction, unspecified erectile dysfunction type N52.9 607.84 Ambulatory Referral to Urology   9. Urinary incontinence, unspecified type R32 788.30 Ambulatory Referral to Urology   10. Hyperlipidemia, unspecified hyperlipidemia type E78.5 272.4 atorvastatin (LIPITOR) 40 MG tablet   11. Congestive heart failure, unspecified HF chronicity, unspecified heart failure type I50.9 428.0 carvedilol (COREG) 25 MG tablet   12. Local skin infection L08.9 686.9 sulfamethoxazole-trimethoprim 800-160mg (BACTRIM DS) 800-160 mg Tab          Plan:    Follow-up in 4 weeks (on 10/10/2018).          Juju Banda MD

## 2018-09-13 PROBLEM — N17.9 AKI (ACUTE KIDNEY INJURY): Status: ACTIVE | Noted: 2018-01-01

## 2018-09-13 PROBLEM — D61.818 PANCYTOPENIA: Status: ACTIVE | Noted: 2018-01-01

## 2018-09-13 PROBLEM — D69.6 THROMBOCYTOPENIA: Status: ACTIVE | Noted: 2018-01-01

## 2018-09-13 NOTE — TELEPHONE ENCOUNTER
Spoke to patient's son, informed him of patient's lab results and importance of going to ED to have transfusion.

## 2018-09-13 NOTE — TELEPHONE ENCOUNTER
----- Message from Ayse Rowell sent at 9/13/2018  4:57 PM CDT -----  Contact: [phone]/Kevin Brooks   Please call her son

## 2018-09-14 PROBLEM — N17.9 AKI (ACUTE KIDNEY INJURY): Status: RESOLVED | Noted: 2018-01-01 | Resolved: 2018-01-01

## 2018-09-14 PROBLEM — R50.81 NEUTROPENIC FEVER: Status: ACTIVE | Noted: 2018-01-01

## 2018-09-14 PROBLEM — D64.9 NORMOCYTIC ANEMIA: Status: ACTIVE | Noted: 2018-01-01

## 2018-09-14 PROBLEM — E78.49 OTHER HYPERLIPIDEMIA: Status: ACTIVE | Noted: 2018-01-01

## 2018-09-14 PROBLEM — D70.9 NEUTROPENIA: Status: ACTIVE | Noted: 2018-01-01

## 2018-09-14 PROBLEM — R74.8 ELEVATED LIVER ENZYMES: Status: ACTIVE | Noted: 2018-01-01

## 2018-09-14 PROBLEM — C95.00 ACUTE LEUKEMIA: Status: ACTIVE | Noted: 2018-01-01

## 2018-09-14 NOTE — ED NOTES
Kevin Crews, an 62 y.o. male presents to the ED for low blood count. He reports weight loss, sob, and fatigue for a few months . Pt has hx of copd and is on home oxygen . He denies cp but endorses sob at points. Son at bedside with . aao x 4.  Also report platelet count of 30 by pcp       Chief Complaint   Patient presents with    Abnormal Lab     doctor told him to come in for low blood count     Review of patient's allergies indicates:  No Known Allergies  Past Medical History:   Diagnosis Date    Heart attack 2010    Hypertension     Prostate cancer     Stroke

## 2018-09-14 NOTE — ASSESSMENT & PLAN NOTE
- ANC 0  - Neutropenic precautions  - No recorded fever here, however subjective fevers, and questionable PNA  - Initiate fluconazole, acyclovir for prophylaxis  - Continue cefepime for now (start 9/14-), if febrile, re-cx and start vancomycin

## 2018-09-14 NOTE — SUBJECTIVE & OBJECTIVE
Past Medical History:   Diagnosis Date    COPD (chronic obstructive pulmonary disease)     Heart attack 2010    Hypertension     Prostate cancer     Stroke        Past Surgical History:   Procedure Laterality Date    CORONARY ANGIOPLASTY WITH STENT PLACEMENT      EYE SURGERY      PROSTATE SURGERY         Review of patient's allergies indicates:  No Known Allergies    No current facility-administered medications on file prior to encounter.      Current Outpatient Medications on File Prior to Encounter   Medication Sig    albuterol (PROVENTIL HFA) 90 mcg/actuation inhaler Inhale 1-2 puffs into the lungs every 4 (four) hours as needed for Wheezing or Shortness of Breath.    amLODIPine (NORVASC) 10 MG tablet Take 1 tablet (10 mg total) by mouth once daily.    aspirin 81 MG Chew Take 1 tablet (81 mg total) by mouth once daily.    atorvastatin (LIPITOR) 40 MG tablet Take 1 tablet (40 mg total) by mouth once daily.    carvedilol (COREG) 25 MG tablet Take 1 tablet (25 mg total) by mouth 2 (two) times daily with meals.    fluticasone-vilanterol (BREO) 100-25 mcg/dose diskus inhaler Inhale 1 puff into the lungs once daily. Controller    hydroCHLOROthiazide (HYDRODIURIL) 25 MG tablet Take 1 tablet (25 mg total) by mouth once daily.    ipratropium (ATROVENT HFA) 17 mcg/actuation inhaler Inhale 2 puffs into the lungs every 6 (six) hours. Rescue    lisinopril (PRINIVIL,ZESTRIL) 40 MG tablet Take 1 tablet (40 mg total) by mouth once daily.    sulfamethoxazole-trimethoprim 800-160mg (BACTRIM DS) 800-160 mg Tab Take 1 tablet by mouth 2 (two) times daily. for 10 days     Family History     None        Tobacco Use    Smoking status: Current Every Day Smoker     Packs/day: 1.00     Years: 40.00     Pack years: 40.00     Types: Cigarettes    Smokeless tobacco: Never Used   Substance and Sexual Activity    Alcohol use: Yes     Comment: RARELY    Drug use: Yes     Types: Cocaine     Comment: HAVEN'T USED IN 2 WEEKS     Sexual activity: Not on file     Review of Systems   Constitutional: Positive for chills. Negative for activity change, fatigue and fever.   HENT: Negative for congestion, facial swelling, sore throat and voice change.    Eyes: Negative for pain, redness and visual disturbance.   Respiratory: Positive for cough and shortness of breath. Negative for chest tightness and wheezing.    Cardiovascular: Negative for chest pain and leg swelling.   Gastrointestinal: Negative for abdominal pain, constipation, diarrhea and nausea.   Musculoskeletal: Negative for neck pain.     Objective:     Vital Signs (Most Recent):  Temp: 99 °F (37.2 °C) (09/13/18 2308)  Pulse: 69 (09/13/18 2308)  Resp: 16 (09/13/18 2308)  BP: 102/60 (09/13/18 2308)  SpO2: 96 % (09/13/18 2308) Vital Signs (24h Range):  Temp:  [98.7 °F (37.1 °C)-99 °F (37.2 °C)] 99 °F (37.2 °C)  Pulse:  [69-80] 69  Resp:  [16-18] 16  SpO2:  [95 %-96 %] 96 %  BP: (100-102)/(58-60) 102/60     Weight: 83.9 kg (185 lb)  Body mass index is 25.8 kg/m².    Physical Exam   Constitutional: He is oriented to person, place, and time. He appears well-developed and well-nourished.   HENT:   Head: Normocephalic and atraumatic.   There is no lymphadenopathy present.    Eyes: EOM are normal. Pupils are equal, round, and reactive to light.   Neck: Normal range of motion. Neck supple. No tracheal deviation present. No thyromegaly present.   Cardiovascular: Normal rate and regular rhythm. Exam reveals no friction rub.   No murmur heard.  Pulmonary/Chest: Effort normal and breath sounds normal. No respiratory distress. He has no wheezes. He exhibits no tenderness.   Abdominal: Soft. Bowel sounds are normal. He exhibits no distension. There is no tenderness.   Musculoskeletal: Normal range of motion. He exhibits no edema.   Neurological: He is alert and oriented to person, place, and time.   Skin: Skin is warm and dry. Capillary refill takes less than 2 seconds. No rash noted. No erythema.    There is a healing wound on his right shin.          CRANIAL NERVES     CN III, IV, VI   Pupils are equal, round, and reactive to light.  Extraocular motions are normal.      Significant Labs:   CBC:   Recent Labs   Lab  09/13/18   1014  09/13/18   2056   WBC  5.40  5.76   HGB  8.2*  8.0*   HCT  24.8*  24.3*   PLT  30*  31*     CMP:   Recent Labs   Lab  09/13/18   1014   NA  135*   K  3.4*   CL  95   CO2  32*   GLU  111*   BUN  12   CREATININE  1.3   CALCIUM  9.0   PROT  7.6   ALBUMIN  3.2*   BILITOT  0.7   ALKPHOS  68   AST  97*   ALT  86*   ANIONGAP  8   EGFRNONAA  59.3*

## 2018-09-14 NOTE — H&P
"[facility] Medicine  History & Physical    Patient Name: Kevin Crews  MRN: [id number]  Admission Date: 9/13/2018  Attending Physician: Monica Gleason MD   Primary Care Provider: [facility] Medicine Team: [facility] MED 5 Carlin Marie MD     Patient information was obtained from patient, past medical records and ER records.     Subjective:     Principal Problem:Pancytopenia    Chief Complaint:   Chief Complaint   Patient presents with    Abnormal Lab     doctor told him to come in for low blood count        HPI: Mr. Kevin Crews is a 61 yo male with a PMHx of HTN, tobacco abuse, COPD, prostate cancer, CVA (2010) presents after being seen in his PCP's office for abnormal labs. He reports having "on and off" bouts of fatigue, worsening SOB and cough. For about 1 week he has been having a worsening productive cough associated with chills but no fever. He has experienced full body aches, worse in his shoulders. Reports he can "sleep all day" because he's always so tired. Over the past 4 months, he reports night sweats, chills and about a 10 lb weight loss. He reports since his stroke in 2010, he has been bleeding easier when he cuts himself or falls and injuries his skin. He denies any gum bleeding, fevers, n/v, headaches, focal weakness, dysuria or chest pain.     Patient was recently discharged from the hospital 4 months ago where he was found to have decreased WCC. He was managed for a pneumonia vs possible COPD exacerbation and discharged from the hospital. Patient is on oxygen at home for COPD 1-2L PRN at night. Patient has not been smoking or drinking for the past couple of weeks but normally smokes 1.5ppd.     Past Medical History:   Diagnosis Date    COPD (chronic obstructive pulmonary disease)     Heart attack 2010    Hypertension     Prostate cancer     Stroke        Past Surgical History:   Procedure Laterality Date    CORONARY ANGIOPLASTY WITH " STENT PLACEMENT      EYE SURGERY      PROSTATE SURGERY         Review of patient's allergies indicates:  No Known Allergies    No current facility-administered medications on file prior to encounter.      Current Outpatient Medications on File Prior to Encounter   Medication Sig    albuterol (PROVENTIL HFA) 90 mcg/actuation inhaler Inhale 1-2 puffs into the lungs every 4 (four) hours as needed for Wheezing or Shortness of Breath.    amLODIPine (NORVASC) 10 MG tablet Take 1 tablet (10 mg total) by mouth once daily.    aspirin 81 MG Chew Take 1 tablet (81 mg total) by mouth once daily.    atorvastatin (LIPITOR) 40 MG tablet Take 1 tablet (40 mg total) by mouth once daily.    carvedilol (COREG) 25 MG tablet Take 1 tablet (25 mg total) by mouth 2 (two) times daily with meals.    fluticasone-vilanterol (BREO) 100-25 mcg/dose diskus inhaler Inhale 1 puff into the lungs once daily. Controller    hydroCHLOROthiazide (HYDRODIURIL) 25 MG tablet Take 1 tablet (25 mg total) by mouth once daily.    ipratropium (ATROVENT HFA) 17 mcg/actuation inhaler Inhale 2 puffs into the lungs every 6 (six) hours. Rescue    lisinopril (PRINIVIL,ZESTRIL) 40 MG tablet Take 1 tablet (40 mg total) by mouth once daily.    sulfamethoxazole-trimethoprim 800-160mg (BACTRIM DS) 800-160 mg Tab Take 1 tablet by mouth 2 (two) times daily. for 10 days     Family History     None        Tobacco Use    Smoking status: Current Every Day Smoker     Packs/day: 1.00     Years: 40.00     Pack years: 40.00     Types: Cigarettes    Smokeless tobacco: Never Used   Substance and Sexual Activity    Alcohol use: Yes     Comment: RARELY    Drug use: Yes     Types: Cocaine     Comment: HAVEN'T USED IN 2 WEEKS    Sexual activity: Not on file     Review of Systems   Constitutional: Positive for chills. Negative for activity change, fatigue and fever.   HENT: Negative for congestion, facial swelling, sore throat and voice change.    Eyes: Negative for pain,  redness and visual disturbance.   Respiratory: Positive for cough and shortness of breath. Negative for chest tightness and wheezing.    Cardiovascular: Negative for chest pain and leg swelling.   Gastrointestinal: Negative for abdominal pain, constipation, diarrhea and nausea.   Musculoskeletal: Negative for neck pain.     Objective:     Vital Signs (Most Recent):  Temp: 99 °F (37.2 °C) (09/13/18 2308)  Pulse: 69 (09/13/18 2308)  Resp: 16 (09/13/18 2308)  BP: 102/60 (09/13/18 2308)  SpO2: 96 % (09/13/18 2308) Vital Signs (24h Range):  Temp:  [98.7 °F (37.1 °C)-99 °F (37.2 °C)] 99 °F (37.2 °C)  Pulse:  [69-80] 69  Resp:  [16-18] 16  SpO2:  [95 %-96 %] 96 %  BP: (100-102)/(58-60) 102/60     Weight: 83.9 kg (185 lb)  Body mass index is 25.8 kg/m².    Physical Exam   Constitutional: He is oriented to person, place, and time. He appears well-developed and well-nourished.   HENT:   Head: Normocephalic and atraumatic.   There is no lymphadenopathy present.    Eyes: EOM are normal. Pupils are equal, round, and reactive to light.   Neck: Normal range of motion. Neck supple. No tracheal deviation present. No thyromegaly present.   Cardiovascular: Normal rate and regular rhythm. Exam reveals no friction rub.   No murmur heard.  Pulmonary/Chest: Effort normal and breath sounds normal. No respiratory distress. He has no wheezes. He exhibits no tenderness.   Abdominal: Soft. Bowel sounds are normal. He exhibits no distension. There is no tenderness.   Musculoskeletal: Normal range of motion. He exhibits no edema.   Neurological: He is alert and oriented to person, place, and time.   Skin: Skin is warm and dry. Capillary refill takes less than 2 seconds. No rash noted. No erythema.   There is a healing wound on his right shin.          CRANIAL NERVES     CN III, IV, VI   Pupils are equal, round, and reactive to light.  Extraocular motions are normal.      Significant Labs:   CBC:   Recent Labs   Lab  09/13/18   1014  09/13/18    2056   WBC  5.40  5.76   HGB  8.2*  8.0*   HCT  24.8*  24.3*   PLT  30*  31*     CMP:   Recent Labs   Lab  09/13/18   1014   NA  135*   K  3.4*   CL  95   CO2  32*   GLU  111*   BUN  12   CREATININE  1.3   CALCIUM  9.0   PROT  7.6   ALBUMIN  3.2*   BILITOT  0.7   ALKPHOS  68   AST  97*   ALT  86*   ANIONGAP  8   EGFRNONAA  59.3*     Assessment/Plan:     * Pancytopenia    Patient presenting with a 4 month history of night sweats, weight loss, chills and body ache. DDx include myelofibrosis vs aplastic anemia vs drug induced thrombocytopenia vs ITP/TTP vs hemolytic anemia. Was found to have WCC <2 on previous admission in May. Now presenting with thrombocytopenia without bleeding and anemia.     Plan:   - LDH, retic count, Direct Yari test, blood smear, JEMIMA pending.   - No clear medication causing iatrogenic thombocytopenia.   - Heme/Onc consult  - Will transfuse platelets <10 or with bleeding.           RUDI (acute kidney injury)    - Cr of 1.3 on admission.   - Baseline of 0.8-0.9.   - UA pending  - Will hold home lisinopril and HCTZ.   - Continue to trend.         History of CVA (cerebrovascular accident)    - continue home ASA           Tobacco dependence    - Reports recent smoking cessation but long history of smoking  - May require nicotine patches if patient requests.         Essential hypertension    - BP stable on admission of 103/50s.   - Holding HCTZ and Lisinopril due RUDI  - Continue home coreg.         COPD exacerbation    - No wheezing on exam.   - Will continue home Breo.   - PRN duo-nebs.           VTE Risk Mitigation (From admission, onward)        Ordered     IP VTE LOW RISK PATIENT  Once      09/13/18 2351     Place THERESE hose  Until discontinued      09/13/18 2351             Carlin Marie MD  Department of Hospital Medicine   [facility]

## 2018-09-14 NOTE — ED TRIAGE NOTES
PATIENT PRESENT IN ER AFTER GOING TO LAB THIS MORNING AND BEING INFORMED OF ABNORMAL BLOOD WORK IN REGARDS TO RBC AND PLATELETS. HE IS ON BLOOD THINNERS BUT HAS NOT TAKEN THEM IN THE LAST WEEK.

## 2018-09-14 NOTE — PT/OT/SLP EVAL
Occupational Therapy   Evaluation    Name: Kevin Crews  MRN: [id number]  Admitting Diagnosis:  Neutropenia      Recommendations:     Discharge Recommendations: home with home health  Discharge Equipment Recommendations:  shower chair  Barriers to discharge:  None    History:     Occupational Profile:  Living Environment: Pt lives with son in apartment with 2 steps to enter   Previous level of function: Pt reports independent with self care prior to admission with occasional assist from son in recent weeks   Equipment Used at Home:  cane, straight, shower chair  Assistance upon Discharge: son available to assist after DC     Past Medical History:   Diagnosis Date    COPD (chronic obstructive pulmonary disease)     Heart attack 2010    Hypertension     Prostate cancer     Stroke        Past Surgical History:   Procedure Laterality Date    CORONARY ANGIOPLASTY WITH STENT PLACEMENT      EYE SURGERY      PROSTATE SURGERY         Subjective     Chief Complaint: Pt denies complaints  Patient/Family Comments/goals: return to home     Pain/Comfort:  · Pain Rating 1: 0/10    Patients cultural, spiritual, Spiritism conflicts given the current situation: none stated     Objective:     Communicated with: RN prior to session.  Patient found all lines intact and (lines intact) upon OT entry to room.    General Precautions: Standard, fall   Orthopedic Precautions:N/A   Braces: N/A     Occupational Performance:    Bed Mobility:    · Pt able to demonstrate rolling left/right without assistance.  Sidelying to sitting edge of bed with CGA    Activities of Daily Living:  · Pt with independent use of urinal.  Poor tolerance of transfer for commode toileting.  Able to complete upper extremity dressing     Cognitive/Visual Perceptual:  Cognitive/Psychosocial Skills:     -       Oriented to: Person, Place, Time and Situation   -       Follows Commands/attention:Follows two-step commands  -       Communication: clear/fluent  -        "Memory: No Deficits noted  -       Safety awareness/insight to disability: intact   -       Mood/Affect/Coping skills/emotional control: Appropriate to situation    Physical Exam:  Upper Extremity Range of Motion:     -       Right Upper Extremity: WFL    -       Left Upper Extremity: WFL      Upper Extremity Strength:    -       Right Upper Extremity: 3/5  -       Left Upper Extremity: 3/5      AMPAC 6 Click ADL:  AMPA Total Score: 19    Treatment & Education:  Evaluation complete and goals set.  Pt educated on safety, role of OT, importance of increased participation in self care for gains , expectations for participation, expectations for gains, POC, energy conservation, caregiver strain. White board updated.     Education:    Patient left supine with all lines intact    Assessment:     Kevin Crews is a 62 y.o. male with a medical diagnosis of Neutropenia. Pt presents supine in bed and willing to participate.  Pt pleasant and talkative with overall functional performance ability but poor endurance.   He presents with the following performance deficits affecting function: weakness, impaired endurance, impaired self care skills, impaired functional mobilty.      Rehab Prognosis: Fair; patient would benefit from acute skilled OT services to address these deficits and reach maximum level of function.         Clinical Decision Makin.  OT Low:  "Pt evaluation falls under low complexity for evaluation coding due to performance deficits noted in 1-3 areas as stated above and 0 co-morbities affecting current functional status. Data obtained from problem focused assessments. No modifications or assistance was required for completion of evaluation. Only brief occupational profile and history review completed."     Plan:     Patient to be seen 2 x/week to address the above listed problems via self-care/home management, therapeutic activities, therapeutic exercises  · Plan of Care Expires: 10/14/18  · Plan of Care " Reviewed with: patient    This Plan of care has been discussed with the patient who was involved in its development and understands and is in agreement with the identified goals and treatment plan    GOALS:   Multidisciplinary Problems     Occupational Therapy Goals        Problem: Occupational Therapy Goal    Goal Priority Disciplines Outcome Interventions   Occupational Therapy Goal     OT, PT/OT Ongoing (interventions implemented as appropriate)    Description:  Goals to be met by:10/1    Patient will increase functional independence with ADLs by performing:    UE Dressing with [place].  LE Dressing with [place].  Grooming while standing at sink with Stand-by Assistance.  Toileting from toilet with Stand-by Assistance for hygiene and clothing management.                       Time Tracking:     OT Date of Treatment: 09/14/18  OT Start Time: 1025  OT Stop Time: 1045  OT Total Time (min): 20 min    Billable Minutes:Evaluation 20    Amirah Hassan OT  9/14/2018

## 2018-09-14 NOTE — PLAN OF CARE
Problem: Occupational Therapy Goal  Goal: Occupational Therapy Goal  Goals to be met by:10/1    Patient will increase functional independence with ADLs by performing:    UE Dressing with [place].  LE Dressing with [place].  Grooming while standing at sink with Stand-by Assistance.  Toileting from toilet with Stand-by Assistance for hygiene and clothing management.     Outcome: Ongoing (interventions implemented as appropriate)  Evaluation complete and goals set.  Cont with POC  Amirah Hassan OT  9/14/2018

## 2018-09-14 NOTE — ASSESSMENT & PLAN NOTE
Patient presenting with a 4 month history of night sweats, weight loss, chills and body ache. DDx include myelofibrosis vs aplastic anemia vs drug induced thrombocytopenia vs ITP/TTP vs hemolytic anemia. Was found to have WCC <2 on previous admission in May. Now presenting with thrombocytopenia without bleeding and anemia.     Plan:   - LDH, retic count, Direct Yari test, blood smear, JEMIMA pending.   - No clear medication causing iatrogenic thombocytopenia.   - Heme/Onc consult  - Will transfuse platelets <10 or with bleeding.

## 2018-09-14 NOTE — ASSESSMENT & PLAN NOTE
- Peripheral smear with 70% blasts  - Daily CBC with diff  - DIC and TLS labs ordered  - HBC core total Ab and G6PD ordered  - Start allopurinol   - Anti-microbial prophylaxis under neutropenia  - BM biopsy to be done today 9/14  - Patient transferred to BMT service  - ECHO ordered  - Flow cytometry pending  - PICC consult placed

## 2018-09-14 NOTE — ASSESSMENT & PLAN NOTE
- Holding home amlodipine, coreg, lisinopril and HCTZ  - SBP currently 100s, reassess starting anti-hypertensives if needed

## 2018-09-14 NOTE — ASSESSMENT & PLAN NOTE
- Cr of 1.3 on admission.   - Baseline of 0.8-0.9.   - UA pending  - Will hold home lisinopril and HCTZ.   - Continue to trend.

## 2018-09-14 NOTE — PLAN OF CARE
PCP- NONE    PT HAS A RIDE HOME AND FAMILY SUPPORT WITH ADULT SON    PHARMACY- [facility] [address] [place] [address]       09/14/18 1816   Discharge Assessment   Assessment Type Discharge Planning Assessment   Confirmed/corrected address and phone number on facesheet? Yes   Assessment information obtained from? Patient   Expected Length of Stay (days) 3   Communicated expected length of stay with patient/caregiver yes   Prior to hospitilization cognitive status: Alert/Oriented   Prior to hospitalization functional status: Independent   Current cognitive status: Alert/Oriented   Current Functional Status: Independent   Lives With child(stuart), adult   Able to Return to Prior Arrangements yes   Is patient able to care for self after discharge? Yes   Patient's perception of discharge disposition home or selfcare   Readmission Within The Last 30 Days no previous admission in last 30 days   Patient currently being followed by outpatient case management? No   Patient currently receives any other outside agency services? No   Equipment Currently Used at Home cane, straight;shower chair   Do you have any problems affording any of your prescribed medications? No   Is the patient taking medications as prescribed? yes   Does the patient have transportation home? Yes   Transportation Available car;family or friend will provide   Does the patient receive services at the [facility]madin Clinic? No   Discharge Plan A Home with family   Discharge Plan B Home with family   Patient/Family In Agreement With Plan yes

## 2018-09-14 NOTE — ASSESSMENT & PLAN NOTE
- BP stable on admission of 103/50s.   - Holding HCTZ and Lisinopril due RUDI  - Continue home coreg.

## 2018-09-14 NOTE — SUBJECTIVE & OBJECTIVE
Patient information was obtained from patient.       Medications Prior to Admission   Medication Sig Dispense Refill Last Dose    albuterol (PROVENTIL HFA) 90 mcg/actuation inhaler Inhale 1-2 puffs into the lungs every 4 (four) hours as needed for Wheezing or Shortness of Breath. 18 g 0     amLODIPine (NORVASC) 10 MG tablet Take 1 tablet (10 mg total) by mouth once daily. 90 tablet 0     aspirin 81 MG Chew Take 1 tablet (81 mg total) by mouth once daily.  0 Taking    atorvastatin (LIPITOR) 40 MG tablet Take 1 tablet (40 mg total) by mouth once daily. 90 tablet 0     carvedilol (COREG) 25 MG tablet Take 1 tablet (25 mg total) by mouth 2 (two) times daily with meals. 180 tablet 0     guaifenesin/phenylephrine HCl (MUCINEX COLD ORAL) Take 5 mLs by mouth daily as needed (cold and congestion).       hydroCHLOROthiazide (HYDRODIURIL) 25 MG tablet Take 1 tablet (25 mg total) by mouth once daily. 90 tablet 2     ipratropium (ATROVENT HFA) 17 mcg/actuation inhaler Inhale 2 puffs into the lungs every 6 (six) hours. Rescue 1 Inhaler 2     lisinopril (PRINIVIL,ZESTRIL) 40 MG tablet Take 1 tablet (40 mg total) by mouth once daily. 90 tablet 0     sulfamethoxazole-trimethoprim 800-160mg (BACTRIM DS) 800-160 mg Tab Take 1 tablet by mouth 2 (two) times daily. for 10 days 20 tablet 0        Patient has no known allergies.     Past Medical History:   Diagnosis Date    COPD (chronic obstructive pulmonary disease)     Heart attack 2010    Hypertension     Prostate cancer     Stroke      Past Surgical History:   Procedure Laterality Date    CORONARY ANGIOPLASTY WITH STENT PLACEMENT      EYE SURGERY      PROSTATE SURGERY       Family History     None        Tobacco Use    Smoking status: Current Every Day Smoker     Packs/day: 1.00     Years: 40.00     Pack years: 40.00     Types: Cigarettes    Smokeless tobacco: Never Used   Substance and Sexual Activity    Alcohol use: Yes     Comment: RARELY    Drug use: Yes      Types: Cocaine     Comment: HAVEN'T USED IN 2 WEEKS    Sexual activity: Not on file       Review of Systems   Constitutional: Positive for chills, fever and unexpected weight change.   HENT: Negative for mouth sores, nosebleeds and sore throat.    Respiratory: Positive for shortness of breath. Negative for cough.    Cardiovascular: Negative for chest pain and leg swelling.   Gastrointestinal: Negative for abdominal pain, blood in stool, nausea and vomiting.   Genitourinary: Negative for dysuria and hematuria.   Musculoskeletal: Negative for arthralgias and back pain.   Skin: Negative for rash.   Neurological: Negative for light-headedness and headaches.   Hematological: Negative for adenopathy. Bruises/bleeds easily.     Objective:     Vital Signs (Most Recent):  Temp: 97.9 °F (36.6 °C) (09/14/18 0856)  Pulse: 72 (09/14/18 1319)  Resp: (!) 23 (09/14/18 1319)  BP: 103/72 (09/14/18 1319)  SpO2: 98 % (09/14/18 1319) Vital Signs (24h Range):  Temp:  [97.9 °F (36.6 °C)-99.7 °F (37.6 °C)] 97.9 °F (36.6 °C)  Pulse:  [60-80] 72  Resp:  [12-23] 23  SpO2:  [93 %-100 %] 98 %  BP: (100-118)/(58-82) 103/72     Weight: 83.9 kg (185 lb)  Body mass index is 25.8 kg/m².  Body surface area is 2.05 meters squared.    ECOG SCORE         [unfilled]    Lines/Drains/Airways     Peripheral Intravenous Line                 Peripheral IV - Single Lumen 09/13/18 2055 Right Antecubital less than 1 day         Peripheral IV - Single Lumen 09/14/18 1340 Anterior;Right Antecubital less than 1 day                Physical Exam   Constitutional: He appears well-developed and well-nourished.   HENT:   Head: Normocephalic and atraumatic.   Poor dentition, no mucositis   Eyes: EOM are normal. Pupils are equal, round, and reactive to light. No scleral icterus.   Neck: Neck supple.   Cardiovascular: Normal rate and regular rhythm.   Pulmonary/Chest: Effort normal and breath sounds normal. No respiratory distress. He has no wheezes.   On nasal cannula    Abdominal: Soft. He exhibits no distension. There is no tenderness.   Musculoskeletal: Normal range of motion. He exhibits no edema.   Lymphadenopathy:     He has no cervical adenopathy.   Neurological: He is alert.   Skin: Skin is warm and dry.   Psychiatric: He has a normal mood and affect. His behavior is normal.       Significant Labs:   CBC:   Recent Labs   Lab  09/13/18   1014  09/13/18   2056  09/14/18   0718   WBC  5.40  5.76  5.99   HGB  8.2*  8.0*  7.6*   HCT  24.8*  24.3*  23.1*   PLT  30*  31*  28*   , CMP:   Recent Labs   Lab  09/13/18   1014  09/14/18   0718   NA  135*  139   K  3.4*  3.2*   CL  95  99   CO2  32*  30*   GLU  111*  100   BUN  12  14   CREATININE  1.3  1.2   CALCIUM  9.0  9.0   PROT  7.6  6.9   ALBUMIN  3.2*  2.8*   BILITOT  0.7  0.6  0.6   ALKPHOS  68  71   AST  97*  95*   ALT  86*  98*   ANIONGAP  8  10   EGFRNONAA  59.3*  >60.0    and Coagulation:   Recent Labs   Lab  09/14/18   1031   INR  1.1       Diagnostic Results:  I have reviewed all pertinent imaging results/findings within the past 24 hours.

## 2018-09-14 NOTE — H&P
[facility]  Hematology  Bone Marrow Transplant  H&P    Subjective:     Principal Problem: Acute leukemia    HPI: Kevin Crews is a 62-year-old -American male with COPD, HTN, CVA in 2014 (residual left-sided deficit), HTN, 40 pack year smoking history, prostate cancer s/p prostatectomy (no radiation or chemo), who presented to the hospital due to pancytopenia. The patient states he went to PCP office 3 days ago for medication refills, was given requisition to check blood work, and yesterday with those resulted, he was told to come to the ER. He endorses recently 15 lbs weight loss, fever/chills, night sweats, dyspnea. Some occasional gum bleeding. Denies any other bleeding/bruising, chest pain, abdominal pain, nausea/vomiting/diarrhea.     The patient lives with his 33-year-old son. He is retired. He quit smoking 3 weeks ago. He drinks socially a few times a month, up to 6-pack of beer. He is a retired . Smokes marijuana, denies any other drug use. Denies any family history of malignancy.    On admission (9/13), CBC wit WBC count of 5.4 (63% lymophocytosis, 7% monocytosis), Hb 8.2, and plt 30.  Mild RUDI Cr 1.3 (baseline ~0.7). . Ferritin 2238. Peripheral smear shows 70% blasts. Patient to be transferred from medicine service to BMT service.    Patient information was obtained from patient.       Medications Prior to Admission   Medication Sig Dispense Refill Last Dose    albuterol (PROVENTIL HFA) 90 mcg/actuation inhaler Inhale 1-2 puffs into the lungs every 4 (four) hours as needed for Wheezing or Shortness of Breath. 18 g 0     amLODIPine (NORVASC) 10 MG tablet Take 1 tablet (10 mg total) by mouth once daily. 90 tablet 0     aspirin 81 MG Chew Take 1 tablet (81 mg total) by mouth once daily.  0 Taking    atorvastatin (LIPITOR) 40 MG tablet Take 1 tablet (40 mg total) by mouth once daily. 90 tablet 0     carvedilol (COREG) 25 MG tablet Take 1 tablet (25 mg total)  by mouth 2 (two) times daily with meals. 180 tablet 0     guaifenesin/phenylephrine HCl (MUCINEX COLD ORAL) Take 5 mLs by mouth daily as needed (cold and congestion).       hydroCHLOROthiazide (HYDRODIURIL) 25 MG tablet Take 1 tablet (25 mg total) by mouth once daily. 90 tablet 2     ipratropium (ATROVENT HFA) 17 mcg/actuation inhaler Inhale 2 puffs into the lungs every 6 (six) hours. Rescue 1 Inhaler 2     lisinopril (PRINIVIL,ZESTRIL) 40 MG tablet Take 1 tablet (40 mg total) by mouth once daily. 90 tablet 0     sulfamethoxazole-trimethoprim 800-160mg (BACTRIM DS) 800-160 mg Tab Take 1 tablet by mouth 2 (two) times daily. for 10 days 20 tablet 0        Patient has no known allergies.     Past Medical History:   Diagnosis Date    COPD (chronic obstructive pulmonary disease)     Heart attack 2010    Hypertension     Prostate cancer     Stroke      Past Surgical History:   Procedure Laterality Date    CORONARY ANGIOPLASTY WITH STENT PLACEMENT      EYE SURGERY      PROSTATE SURGERY       Family History     None        Tobacco Use    Smoking status: Current Every Day Smoker     Packs/day: 1.00     Years: 40.00     Pack years: 40.00     Types: Cigarettes    Smokeless tobacco: Never Used   Substance and Sexual Activity    Alcohol use: Yes     Comment: RARELY    Drug use: Yes     Types: Cocaine     Comment: HAVEN'T USED IN 2 WEEKS    Sexual activity: Not on file       Review of Systems   Constitutional: Positive for chills, fever and unexpected weight change.   HENT: Negative for mouth sores, nosebleeds and sore throat.    Respiratory: Positive for shortness of breath. Negative for cough.    Cardiovascular: Negative for chest pain and leg swelling.   Gastrointestinal: Negative for abdominal pain, blood in stool, nausea and vomiting.   Genitourinary: Negative for dysuria and hematuria.   Musculoskeletal: Negative for arthralgias and back pain.   Skin: Negative for rash.   Neurological: Negative for  light-headedness and headaches.   Hematological: Negative for adenopathy. Bruises/bleeds easily.     Objective:     Vital Signs (Most Recent):  Temp: 97.9 °F (36.6 °C) (09/14/18 0856)  Pulse: 72 (09/14/18 1319)  Resp: (!) 23 (09/14/18 1319)  BP: 103/72 (09/14/18 1319)  SpO2: 98 % (09/14/18 1319) Vital Signs (24h Range):  Temp:  [97.9 °F (36.6 °C)-99.7 °F (37.6 °C)] 97.9 °F (36.6 °C)  Pulse:  [60-80] 72  Resp:  [12-23] 23  SpO2:  [93 %-100 %] 98 %  BP: (100-118)/(58-82) 103/72     Weight: 83.9 kg (185 lb)  Body mass index is 25.8 kg/m².  Body surface area is 2.05 meters squared.    ECOG SCORE         [unfilled]    Lines/Drains/Airways     Peripheral Intravenous Line                 Peripheral IV - Single Lumen 09/13/18 2055 Right Antecubital less than 1 day         Peripheral IV - Single Lumen 09/14/18 1340 Anterior;Right Antecubital less than 1 day                Physical Exam   Constitutional: He appears well-developed and well-nourished.   HENT:   Head: Normocephalic and atraumatic.   Poor dentition, no mucositis   Eyes: EOM are normal. Pupils are equal, round, and reactive to light. No scleral icterus.   Neck: Neck supple.   Cardiovascular: Normal rate and regular rhythm.   Pulmonary/Chest: Effort normal and breath sounds normal. No respiratory distress. He has no wheezes.   On nasal cannula   Abdominal: Soft. He exhibits no distension. There is no tenderness.   Musculoskeletal: Normal range of motion. He exhibits no edema.   Lymphadenopathy:     He has no cervical adenopathy.   Neurological: He is alert.   Skin: Skin is warm and dry.   Psychiatric: He has a normal mood and affect. His behavior is normal.       Significant Labs:   CBC:   Recent Labs   Lab  09/13/18   1014  09/13/18   2056  09/14/18   0718   WBC  5.40  5.76  5.99   HGB  8.2*  8.0*  7.6*   HCT  24.8*  24.3*  23.1*   PLT  30*  31*  28*   , CMP:   Recent Labs   Lab  09/13/18   1014  09/14/18   0718   NA  135*  139   K  3.4*  3.2*   CL  95  99   CO2   32*  30*   GLU  111*  100   BUN  12  14   CREATININE  1.3  1.2   CALCIUM  9.0  9.0   PROT  7.6  6.9   ALBUMIN  3.2*  2.8*   BILITOT  0.7  0.6  0.6   ALKPHOS  68  71   AST  97*  95*   ALT  86*  98*   ANIONGAP  8  10   EGFRNONAA  59.3*  >60.0    and Coagulation:   Recent Labs   Lab  09/14/18   1031   INR  1.1       Diagnostic Results:  I have reviewed all pertinent imaging results/findings within the past 24 hours.    Assessment/Plan:     * Acute leukemia    - Peripheral smear with 70% blasts  - Daily CBC with diff  - DIC and TLS labs ordered  - HBC core total Ab and G6PD ordered  - Start allopurinol   - Anti-microbial prophylaxis under neutropenia  - BM biopsy to be done today 9/14  - Patient transferred to BMT service  - ECHO ordered  - Flow cytometry pending  - PICC consult placed        Neutropenia    - ANC 0  - Neutropenic precautions  - No recorded fever here, however subjective fevers, and questionable PNA  - Initiate fluconazole, acyclovir for prophylaxis  - Continue cefepime for now (start 9/14-), if febrile, re-cx and start vancomycin        Pancytopenia    - Transfuse for Hb<7 or plt<10  - WBC 5.4, Hb 8.2, plt 30        COPD exacerbation    - Duonebs Q4H while awake  - Holding home inhalers (albuterol, atrovent, breo)        Acute hypoxemic respiratory failure    - PRN O2 for saturation <88%  - CTA chest ordered to characterize infiltrate, and r/o PE        Acute renal failure    - Cr 1.3 up from baseline ~0.8  - TLS prophylaxis  - IVF at 75cc/hr  - Daily TLS labs        Other hyperlipidemia    - Hold home statin        Normocytic anemia    - See acute leukemia        Elevated liver enzymes    - Daily CMP        History of CVA (cerebrovascular accident)    - Hold home ASA        Tobacco dependence    - Quit smoking 3 weeks ago  - 40 pack year smoking history        Essential hypertension    - Holding home amlodipine, coreg, lisinopril and HCTZ  - SBP currently 100s, reassess starting anti-hypertensives if  needed            VTE Risk Mitigation (From admission, onward)        Ordered     IP VTE LOW RISK PATIENT  Once      09/13/18 2351     Place THERESE hose  Until discontinued      09/13/18 2351          Disposition: pending work-up of leukemia, inpatient therapy    Skyla Benitez MD  Bone Marrow Transplant  Hematology  [facility]

## 2018-09-14 NOTE — ED PROVIDER NOTES
Encounter Date: 9/13/2018    SCRIBE #1 NOTE: I, Jayla Kimble, am scribing for, and in the presence of,  Dr. Hobbs. I have scribed the entire note.       History     Chief Complaint   Patient presents with    Abnormal Lab     doctor told him to come in for low blood count     Time seen by provider: 8:44 PM    This is a 62 y.o. male with medical conditions including HTN, heart attack, stroke, and prostate cancer, who presents with complaint of abnormal lab. Patient went for a normal physical from his PCP few days ago, who called him and said he had a low blood count. Patient states he had chills, SOB, moderate fatigue, bilateral shoulder pain, and fever a few days prior to physical by PCP. Patient states sx have moderately relieved, still experiencing intermittent chills and shoulder pain. Patient admitted about 4 months ago for similar sx, stating he was being evaluated for WBC count. Patient currently endorses unexpected weight loss, intermittent night sweats, intermittent chills, and increased urine frequency. Patient denies fever, chills, cp, SOB, n/v, melena, blood in stool, hematuria, loss of appetite, or hx of infectious diseases. Patient states he has not had any issues since his prostate removal in 2007. Patient currently compliant with at home medications. Other than prostate, patient reports stent placement. Patient is on oxygen at home for COPD. Patient has not been smoking or drinking for the past couple of weeks. Patient currently takes asprin.       The history is provided by the patient.     Review of patient's allergies indicates:  No Known Allergies  Past Medical History:   Diagnosis Date    COPD (chronic obstructive pulmonary disease)     Heart attack 2010    Hypertension     Prostate cancer     Stroke      Past Surgical History:   Procedure Laterality Date    CORONARY ANGIOPLASTY WITH STENT PLACEMENT      EYE SURGERY      PROSTATE SURGERY       History reviewed. No pertinent family  history.  Social History     Tobacco Use    Smoking status: Current Every Day Smoker     Packs/day: 1.00     Years: 40.00     Pack years: 40.00     Types: Cigarettes    Smokeless tobacco: Never Used   Substance Use Topics    Alcohol use: Yes     Comment: RARELY    Drug use: Yes     Types: Cocaine     Comment: HAVEN'T USED IN 2 WEEKS     Review of Systems   Constitutional: Positive for chills (intermittent), diaphoresis (intermittent; at night) and unexpected weight change. Negative for fever.   HENT: Negative for facial swelling, rhinorrhea, sore throat, trouble swallowing and voice change.    Eyes: Negative for visual disturbance.   Respiratory: Negative for cough, chest tightness and shortness of breath.    Cardiovascular: Negative for chest pain and leg swelling.   Gastrointestinal: Negative for abdominal pain, diarrhea, nausea and vomiting.   Genitourinary: Positive for frequency. Negative for dysuria, flank pain and hematuria.   Musculoskeletal: Negative for back pain and gait problem.        Bilateral shoulder pain   Neurological: Negative for weakness, light-headedness, numbness and headaches.   Psychiatric/Behavioral: Negative for behavioral problems and confusion.       Physical Exam     Initial Vitals [09/13/18 1754]   BP Pulse Resp Temp SpO2   (!) 100/58 80 18 98.7 °F (37.1 °C) 95 %      MAP       --         Physical Exam    Nursing note and vitals reviewed.  Constitutional: He appears well-developed and well-nourished. No distress.   Slightly sort of breath. NAD   HENT:   Head: Normocephalic and atraumatic.   Mouth/Throat: Oropharynx is clear and moist.   Conjunctiva pallor bilaterally. Tongue dry.   Eyes: EOM are normal. Pupils are equal, round, and reactive to light.   Neck: Normal range of motion. Neck supple.   Cardiovascular: Normal rate, regular rhythm and normal heart sounds. Exam reveals no gallop and no friction rub.    No murmur heard.  Pulmonary/Chest: Breath sounds normal. No respiratory  distress. He has no wheezes. He has no rhonchi. He has no rales.   Abdominal: Soft. He exhibits no distension.   Musculoskeletal: Normal range of motion.   Neurological: He is alert and oriented to person, place, and time. He has normal strength. No cranial nerve deficit or sensory deficit.   Skin: Skin is warm and dry.   Capillary refill slightly delayed   Psychiatric: His behavior is normal. Thought content normal.         ED Course   Procedures  Labs Reviewed   CBC W/ AUTO DIFFERENTIAL - Abnormal; Notable for the following components:       Result Value    RBC 2.49 (*)     Hemoglobin 8.0 (*)     Hematocrit 24.3 (*)     MCH 32.1 (*)     RDW 19.3 (*)     Platelets 31 (*)     nRBC 1 (*)     Gran% 1.0 (*)     Lymph% 64.0 (*)     Platelet Estimate Decreased (*)     All other components within normal limits    Narrative:      PLT  critical result(s) called and verbal readback obtained from   SHANNAN SHEN RN, 09/13/2018 22:09   DIRECT ANTIGLOBULIN TEST          Imaging Results          X-Ray Chest PA And Lateral (Final result)  Result time 09/13/18 22:12:02    Final result by Isrrael Figueroa MD (09/13/18 22:12:02)                 Impression:      New patchy airspace infiltrate in the mid to lower left lung.  This is relatively vague at present with a more ground-glass appearance rather than consolidation.  Consider infectious etiology, aspiration and unilateral edema.  Recommend follow-up.      Electronically signed by: Isrrael Figueroa MD  Date:    09/13/2018  Time:    22:12             Narrative:    EXAMINATION:  XR CHEST PA AND LATERAL    CLINICAL HISTORY:  Shortness of breath    TECHNIQUE:  Single frontal view of the chest was performed.    COMPARISON:  May 11, 2018.    FINDINGS:  New patchy airspace infiltrate in the mid to lower left lung field.  Right lung is clear.    No pleural effusion or pneumothorax.    Cardiomediastinal silhouette is unremarkable.                                 Medical Decision Making:    History:   Old Medical Records: I decided to obtain old medical records.  Initial Assessment:   62 y.o.  male with no known chronic diseases or processes except COPD presents with acute abnormal CBC to include diminished H&H and platelets. Patient does not have any acute on chronic blood loss in stool or vomitus. As I stated previously, he denies and hx of infectious diseases, such as hepatitis or HIV. He is a former  and still smokes cigarettes, although he has not been smoking in past couple of weeks. He endorses hx of CAD with 2 stents but denies cp today. His cbc that was done a couple days ago is grossly different than cbc 4 moths ago. Will repeat initially along with chest x ray and EKG. Pending cbc results, will decide to admit or not. Of note, patient was admitted for COPD exacerbation 4 months ago.   Differential Diagnosis:   My differential diagnosis includes acute thrombocytopenia, acute symptomatic normocytic anemia, weakness, COPD  Clinical Tests:   Lab Tests: Ordered and Reviewed  Radiological Study: Ordered and Reviewed  Medical Tests: Ordered and Reviewed  ED Management:  10:50PM  CBC returned consistent with earlier CBC with normocytic anemia 8 and 24 and thrombopenia of [address]. Due to this acute change, patient will be admitted to the hospital for further work up. Called internal medicine and spoke with Dr. Tyler, who asked me to consult hemoc secondary to concern for TTP. I spoke with Dr. Hamilton with hemoc who at this time did not think presentation was consistent high likely coates TTP but wanted further information with a hemolytic work up. I discussed this with Dr. Tyler. Patient admitted to hospital and he will consult hemoc and put in hemolytic orders.   Other:   I have discussed this case with another health care provider.            Scribe Attestation:   Scribe #1: I performed the above scribed service and the documentation accurately describes the services I  performed. I attest to the accuracy of the note.               Clinical Impression:   The primary encounter diagnosis was Normocytic anemia. Diagnoses of Weakness, SOB (shortness of breath), and Thrombocytopenia were also pertinent to this visit.      Disposition:   Disposition: Admitted  Condition: Stable                        Percy Hobbs III, MD  09/13/18 8768

## 2018-09-14 NOTE — PROCEDURES
PROCEDURE NOTE:  Procedure Date: 9/14/18  Bone Marrow Biopsy  Indication: acute leukemia  Consent: Informed consent was obtained from patient.  Timeout: Done and documented.  Position: prone  Site: Right posterior illiac crest.  Prep: Betadine.  Needle used: 11 gauge Jamshidi needle.  Anesthetic: 2% lidocaine 10 cc., dilaudid push  Biopsy: The biopsy needle was introduced into the marrow cavity and an aspirate was obtained without complications. Obtained 15 cc aspirate. Core biopsy obtained and sent for routine histologic examination and cytogenetics.  Complications: None  EBL: minimal  Disposition: inpatient

## 2018-09-14 NOTE — ASSESSMENT & PLAN NOTE
- Daily CMP   Forms received from: [employer]   Phone number listed: [phone]   Fax listed: [phone]  Date received: 7-11-18  Form description: walker seat  Once forms are completed, please return to Christine via fax.  Is patient requesting to be contacted when forms are completed: n/a  Form placed: provider desk  Yael Melissa

## 2018-09-14 NOTE — HOSPITAL COURSE
Admitted for concern for leukemia and symptoms of being fatigue, weakness and decrease exercise capacity. Hem Onc consulted and he underwent BMB 9/14 biopsy which showed Findings are consistent with non-M3 acute myeloid leukemia. PICC line initially refused then placed it on 9/17/2018 for plan of induction chemotherapy. Upon evalaution prior induction, he was found to have Hep B core Ab +. Obtaining DNA viral load. Hepatology consulted. They recommended Lamivudine to be stare td now and continue on it after his chemotherapy concluded to minimize the risk of reactivation of his Hep B. He has very poor dentition and high risk to have infected tooth, underwent CT maxillofacial and showed disease in the right side of his teeth with no abscess. Evaluated by dentist and recommendation to undergo an extensive tooth extraction for advanced periodontal disease and generalized advanced active carious decay of remaining teeth in the maxillary arch. Dentist consulted and recommended to have an extensive teeth extraction including 13 teeth to be removed (on 09/20/2018 with Dr. Peters at his office, [address] [place] [address]) and allow up to 10-12 days of healing prior induction with chemotherapy. Appointment for follow up and platelet transfusion including post Dental extraction was arranged and frequent CBC and lab checks for this week and the next one were arranged as well. Plan to have Mr. Kevin Crews back on 10/1/2018 for induction. Discharged with PICC line in place and supplies provided.

## 2018-09-14 NOTE — HPI
Kevin Crews is a 62-year-old -American male with COPD, HTN, CVA in 2014 (residual left-sided deficit), HTN, 40 pack year smoking history, prostate cancer s/p prostatectomy (no radiation or chemo), who presented to the hospital due to pancytopenia. The patient states he went to PCP office 3 days ago for medication refills, was given requisition to check blood work, and yesterday with those resulted, he was told to come to the ER. He endorses recently 15 lbs weight loss, fever/chills, night sweats, dyspnea. Some occasional gum bleeding. Denies any other bleeding/bruising, chest pain, abdominal pain, nausea/vomiting/diarrhea.     The patient lives with his 33-year-old son. He is retired. He quit smoking 3 weeks ago. He drinks socially a few times a month, up to 6-pack of beer. He is a retired . Smokes marijuana, denies any other drug use. Denies any family history of malignancy. On admission (9/13), CBC wit WBC count of 5.4 (63% lymophocytosis, 7% monocytosis), Hb 8.2, and plt 30.  Mild RUDI Cr 1.3 (baseline ~0.7). . Ferritin 2238. Peripheral smear shows 70% blasts. Patient to be transferred from medicine service to BMT service.

## 2018-09-14 NOTE — HPI
"Mr. Kevin Crews is a 63 yo male with a PMHx of HTN, tobacco abuse, COPD, prostate cancer, CVA (2010) presents after being seen in his PCP's office for abnormal labs. He reports having "on and off" bouts of fatigue, worsening SOB and cough. For about 1 week he has been having a worsening productive cough associated with chills but no fever. He has experienced full body aches, worse in his shoulders. Reports he can "sleep all day" because he's always so tired. Over the past 4 months, he reports night sweats, chills and about a 10 lb weight loss. He reports since his stroke in 2010, he has been bleeding easier when he cuts himself or falls and injuries his skin. He denies any gum bleeding, fevers, n/v, headaches, focal weakness, dysuria or chest pain.     Patient was recently discharged from the hospital 4 months ago where he was found to have decreased WCC. He was managed for a pneumonia vs possible COPD exacerbation and discharged from the hospital. Patient is on oxygen at home for COPD 1-2L PRN at night. Patient has not been smoking or drinking for the past couple of weeks but normally smokes 1.5ppd.   "

## 2018-09-14 NOTE — ASSESSMENT & PLAN NOTE
- Reports recent smoking cessation but long history of smoking  - May require nicotine patches if patient requests.

## 2018-09-15 PROBLEM — J96.01 ACUTE HYPOXEMIC RESPIRATORY FAILURE: Status: RESOLVED | Noted: 2018-01-01 | Resolved: 2018-01-01

## 2018-09-15 PROBLEM — E04.1 SOLITARY NODULE OF RIGHT LOBE OF THYROID: Status: ACTIVE | Noted: 2018-01-01

## 2018-09-15 PROBLEM — J44.9 COPD (CHRONIC OBSTRUCTIVE PULMONARY DISEASE): Status: ACTIVE | Noted: 2018-01-01

## 2018-09-15 PROBLEM — N17.9 ACUTE RENAL FAILURE: Status: RESOLVED | Noted: 2018-01-01 | Resolved: 2018-01-01

## 2018-09-15 NOTE — PLAN OF CARE
Problem: Patient Care Overview  Goal: Plan of Care Review  Outcome: Ongoing (interventions implemented as appropriate)  Afebrile. Free from falls or injury. No complaints of pain. NS infusing at 75ml/hr. Acyclovir given as scheduled. Bed locked in lowest position, non skid socks on, call light within reach. Pt instructed to call if any assistance is needed. Vitals stable. Will cont to [facility] pt.

## 2018-09-15 NOTE — ASSESSMENT & PLAN NOTE
- Finding in the Cheat CTA, Enlargement and heterogeneity of the right thyroid lobe.  - No acute intervention at his point, will defer to other time after his leukemia plan started.

## 2018-09-15 NOTE — ASSESSMENT & PLAN NOTE
- Holding home amlodipine, coreg, lisinopril and HCTZ  - Continue to hold his antihypertensive medications

## 2018-09-15 NOTE — PLAN OF CARE
Problem: Physical Therapy Goal  Goal: Physical Therapy Goal  Goals to be met by: 18      Patient will increase functional independence with mobility by performin. Supine to sit with Modified [place]  2. Sit to stand transfer with Modified [place]  3. Gait  x 200 feet with Modified [place] using Single-point Cane .   4. Ascend/descend 2 stair with no Handrails Supervision using Single-point Cane .   5. Standing Lower extremity  exercise program x 20 reps per handout, with supervision    Outcome: Ongoing (interventions implemented as appropriate)  PT eval completed.  Pt presents to PT with decreased endurance, L LE strength deficits due to previous stroke and impaired balance with gait.  Pt with L foot drop and is at increase risk of falls. He will benefit from PT to maximize his functional (I) with mobility, increase his endurance and prevent further deconditioning due to medical status and plan for chemotherapy.   D/C recs: HHPT  DME: none needed at this time, pt owns SC and shower chair.

## 2018-09-15 NOTE — ASSESSMENT & PLAN NOTE
- Peripheral smear with 70% blasts  - HBC core total Ab and G6PD in process   - BM biopsy in process, TTE In process   - Flow cytometry pending  - Start allopurinol for TLS PPx.  - Anti-microbial prophylaxis under neutropenia  - High concern for Health illiteracy and the possible for medical and follow up compliance  - Will have discussion with his son and himself upon finalizing the BMB results

## 2018-09-15 NOTE — ASSESSMENT & PLAN NOTE
- Started on Cefepime and Azithromycin, plan to finish 5 days total   - Stable problem, no acute change, continue current medication.

## 2018-09-15 NOTE — ASSESSMENT & PLAN NOTE
- Neutropenic precautions  - No recorded fever here, however subjective fevers, and questionable PNA  - Initiate fluconazole, acyclovir for prophylaxis  - Continue cefepime, and Azithromycin for Lower RTI

## 2018-09-15 NOTE — SUBJECTIVE & OBJECTIVE
Subjective:     Interval History:  Low Hb, refused PICC placement, denies chest pain, shortness of breath and change in his mentation. He refused PICC line placement today and deferred that to before starting chemotherapy.     Objective:     Vital Signs (Most Recent):  Temp: 97.4 °F (36.3 °C) (09/15/18 0817)  Pulse: 61 (09/15/18 0817)  Resp: 20 (09/15/18 0817)  BP: 125/81 (09/15/18 0817)  SpO2: (!) 89 % (09/15/18 0817) Vital Signs (24h Range):  Temp:  [97.4 °F (36.3 °C)-99.3 °F (37.4 °C)] 97.4 °F (36.3 °C)  Pulse:  [60-90] 61  Resp:  [12-23] 20  SpO2:  [89 %-99 %] 89 %  BP: (101-125)/(65-82) 125/81     Weight: 83.8 kg (184 lb 11.9 oz)  Body mass index is 25.77 kg/m².  Body surface area is 2.05 meters squared.    ECOG SCORE         ECOG Performance Status Score:  1 Restricted in physically strenuous activity but ambulatory and able to carry out work of a light or sedentary nature, e.g., light house work, office work .    Intake/Output - Last 3 Shifts       09/13 0700 - 09/14 0659 09/14 0700 - 09/15 0659 09/15 0700 - 09/16 0659    P.O.  400     Total Intake(mL/kg)  400 (4.8)     Urine (mL/kg/hr) 550 700 (0.3) 450 (1.8)    Total Output 550 700 450    Net -550 -300 -450                 Physical Exam   Constitutional: He appears well-developed and well-nourished.   HENT:   Head: Normocephalic and atraumatic.   Poor dentition, no mucositis   Eyes: EOM are normal. Pupils are equal, round, and reactive to light. No scleral icterus.   Neck: Neck supple.   Cardiovascular: Normal rate and regular rhythm.   Pulmonary/Chest: Effort normal and breath sounds normal. No respiratory distress. He has no wheezes.   On nasal cannula   Abdominal: Soft. He exhibits no distension. There is no tenderness.   Musculoskeletal: Normal range of motion. He exhibits no edema.   Lymphadenopathy:     He has no cervical adenopathy.   Neurological: He is alert.   Skin: Skin is warm and dry.   Psychiatric: He has a normal mood and affect. His  behavior is normal.       Significant Labs:   CBC:   Recent Labs   Lab  09/13/18   2056  09/14/18   0718  09/15/18   0512   WBC  5.76  5.99  8.99   HGB  8.0*  7.6*  6.8*   HCT  24.3*  23.1*  21.6*   PLT  31*  28*  25*   , CMP:   Recent Labs   Lab  09/13/18   1014  09/14/18 0718  09/15/18   0512   NA  135*  139  139   K  3.4*  3.2*  3.9   CL  95  99  105   CO2  32*  30*  25   GLU  111*  100  115*   BUN  12  14  12   CREATININE  1.3  1.2  1.0   CALCIUM  9.0  9.0  8.6*   PROT  7.6  6.9  6.2   ALBUMIN  3.2*  2.8*  2.6*   BILITOT  0.7  0.6  0.6  0.4   ALKPHOS  68  71  64   AST  97*  95*  69*   ALT  86*  98*  86*   ANIONGAP  8  10  9   EGFRNONAA  59.3*  >60.0  >60.0   , Haptoglobin:   Recent Labs   Lab  09/14/18 0718   HAPTOGLOBIN  373*    and Immunology: No results for input(s): SPEP, LIZA, JEMIMA, FREELAMBDALI in the last 48 hours.    Diagnostic Results:  I have reviewed all pertinent imaging results/findings within the past 24 hours.

## 2018-09-15 NOTE — ASSESSMENT & PLAN NOTE
- Duonebs Q4H while awake  - Resume Home inhalers (albuterol, atrovent, breo)  - PRN O2 for saturation <88%  - CTA chest negative for PE

## 2018-09-15 NOTE — PT/OT/SLP EVAL
Physical Therapy Evaluation    Patient Name:  Kevin Crews   MRN:  [id number]    Recommendations:     Discharge Recommendations:  home health PT   Discharge Equipment Recommendations: none(no additional DME recommended at this time.)   Barriers to discharge: Inaccessible home and 2 steps to enter with no rail.    Assessment:     Kevin Crews is a 62 y.o. male admitted with a medical diagnosis of Acute leukemia.  He presents with the following impairments/functional limitations:  weakness, impaired endurance, gait instability, impaired balance, decreased lower extremity function, impaired cardiopulmonary response to activity  Pt presents to PT with decreased endurance, L LE strength deficits due to previous stroke and impaired balance with gait.  Pt with L foot drop and is at increase risk of falls. He will benefit from skilled PT to maximize his functional (I) with mobility, increase his endurance and prevent further deconditioning due to medical status and plan for chemotherapy..    Rehab Prognosis:  good; patient would benefit from acute skilled PT services to address these deficits and reach maximum level of function.      Recent Surgery: * No surgery found *      Plan:     During this hospitalization, patient to be seen 3 x/week to address the above listed problems via gait training, therapeutic activities, therapeutic exercises, neuromuscular re-education  · Plan of Care Expires:  10/15/18   Plan of Care Reviewed with: patient    Subjective     Communicated with Laura beltran prior to session and she cleared pt for PT.  Per chart review pt's early AM labs today were:  HCT 21.6 and HgB 6.8 ,  but Laura reports pt received blood transfusion this AM and is cleared for OOB activity as kerrie with PT.       Patient found seated OOB playing dominos with family ( son and son's girlfriend) upon PT entry to room, agreeable to evaluation.      Chief Complaint: pt with no c/o  Patient comments/goals: to go  home  Pain/Comfort:  · Pain Rating 1: 0/10  · Pain Rating Post-Intervention 1: 0/10    Patients cultural, spiritual, Anabaptist conflicts given the current situation: none    Living Environment:  Pt lives with his son in an apt with 2 entry steps, no rails.   Prior to admission, patients level of function was amb with SC but sometimes would not use the SC. He used to have an AFO but does not use. Was (I) with ADL's.   Patient has the following equipment: cane, straight, shower chair.  DME owned (not currently used): none.  Upon discharge, patient will have assistance from his son.    Objective:     Patient found with: peripheral IV     General Precautions: Standard, fall, neutropenic(mask and gloves on for out of room)   Orthopedic Precautions:N/A   Braces: N/A(pt reports he used to wear an AFO but does not anymore)     Exams:  · Cognitive Exam:  Patient is oriented to Person, Place and Situation  · Fine Motor Coordination:    · -      None noted for   Left hand, diadochokinesis skill  and Right hand, diadochokinesis skill   · Gross Motor Coordination:  Impaired L LE for all movements  · Postural Exam:  Patient presented with the following abnormalities:    · -       Rounded shoulders  · -       Forward head  · Sensation:    · -       Intact  · RLE ROM: WNL  · RLE Strength: WNL  · LLE ROM: WFL except decreased ankle DF noted  · LLE Strength: hip and knee flex noted to be near full ROM against gravity, but poor coordination noted and accessory muscle use to achieve full ROM.  No active ankle DF or PF noted.  Pt is noted to have increase extensor tone in L LE at times.    Functional Mobility:  · Transfers:     · Sit to Stand:  supervision with no AD  · Gait: 20' with R HHA x 1 and CGA for balance .  Pt with noted decrease L heel strike, L foot flat after swing phase of gait with increase L hip flex during swing phase of gait for toe clearance.  Pt with not with a foot drop due to increase tone but he is not able to  clear his toes without increase L Hip flex.   · Balance: sitting static/dynamic:  good  Stand static: good,  Stand dynamic: Fair    AM-PAC 6 CLICK MOBILITY  Total Score:20       Therapeutic Activities and Exercises:   Pt ed on PT POC for 3 x wk, safety awareness, increase fall risk due to L LE deficits and benefits of using AFO for L foot clearance and importance of mobility with nsg in addition to therapy sessions.  He verbalized good understanding back to PT.  Pt demonstrated no symptoms of distress during session.   White board updated in pt's room and nsg updated on pt's performance during therapy.      Patient left up in chair with all lines intact and call button in reach and nsg, Laura notified.    GOALS:   Multidisciplinary Problems     Physical Therapy Goals        Problem: Physical Therapy Goal    Goal Priority Disciplines Outcome Goal Variances Interventions   Physical Therapy Goal     PT, PT/OT Ongoing (interventions implemented as appropriate)     Description:  Goals to be met by: 18      Patient will increase functional independence with mobility by performin. Supine to sit with Modified Hood  2. Sit to stand transfer with Modified Hood  3. Gait  x 200 feet with Modified Hood using Single-point Cane .   4. Ascend/descend 2 stair with no Handrails Supervision using Single-point Cane .   5. Standing Lower extremity  exercise program x 20 reps per handout, with supervision                      History:     Past Medical History:   Diagnosis Date    COPD (chronic obstructive pulmonary disease)     Heart attack     Hypertension     Prostate cancer     Stroke        Past Surgical History:   Procedure Laterality Date    CORONARY ANGIOPLASTY WITH STENT PLACEMENT      EYE SURGERY      PROSTATE SURGERY         Clinical Decision Making:     History  Co-morbidities and personal factors that may impact the plan of care Examination  Body Structures and Functions,  activity limitations and participation restrictions that may impact the plan of care Clinical Presentation   Decision Making/ Complexity Score   Co-morbidities:   [] Time since onset of injury / illness / exacerbation  [x] Status of current condition- Neutoropenia, abnormal lab values  []Patient's cognitive status and safety concerns    [] Multiple Medical Problems (see med hx)  Personal Factors:   [] Patient's age  [] Prior Level of function   [] Patient's home situation (environment and family support)  [] Patient's level of motivation  [] Expected progression of patient      HISTORY:(criteria)    [] 05977 - no personal factors/history    [x] 69099 - has 1-2 personal factor/comorbidity     [] 21293 - has >3 personal factor/comorbidity     Body Regions:  [] Objective examination findings  [] Head     []  Neck  [] Trunk   [] Upper Extremity  [] Lower Extremity    Body Systems:  [] For communication ability, affect, cognition, language, and learning style: the assessment of the ability to make needs known, consciousness, orientation (person, place, and time), expected emotional /behavioral responses, and learning preferences (eg, learning barriers, education  needs)  [x] For the neuromuscular system: a general assessment of gross coordinated movement (eg, balance, gait, locomotion, transfers, and transitions) and motor function  (motor control and motor learning)  [x] For the musculoskeletal system: the assessment of gross symmetry, gross range of motion, gross strength, height, and weight  [] For the integumentary system: the assessment of pliability(texture), presence of scar formation, skin color, and skin integrity  [x] For cardiovascular/pulmonary system: the assessment of heart rate, respiratory rate, blood pressure, and edema     Activity limitations:    [] Patient's cognitive status and saf ety concerns          [] Status of current condition      [] Weight bearing restriction  [] Cardiopulmunary  Restriction    Participation Restrictions:   [] Goals and goal agreement with the patient     [] Rehab potential (prognosis) and probable outcome      Examination of Body System: (criteria)    [] 90922 - addressing 1-2 elements    [x] 34408 - addressing a total of 3 or more elements     [] 63072 -  Addressing a total of 4 or more elements         Clinical Presentation: (criteria)  Stable - 57512     On examination of body system using standardized tests and measures patient presents with 1-2 elements from any of the following: body structures and functions, activity limitations, and/or participation restrictions.  Leading to a clinical presentation that is considered stable and/or uncomplicated                              Clinical Decision Making  (Eval Complexity):  Low- 83126     Time Tracking:     PT Received On: 09/15/18  PT Start Time: 1324     PT Stop Time: 1349  PT Total Time (min): 25 min     Billable Minutes: Evaluation 25      Paula Hurley, PT  09/15/2018

## 2018-09-15 NOTE — PROGRESS NOTES
[facility]  Hematology  Bone Marrow Transplant  Progress Note    Patient Name: Kevin Crews  Admission Date: 9/13/2018  Hospital Length of Stay: 2 days  Code Status: Full Code    Subjective:     Interval History:  Low Hb, refused PICC placement, denies chest pain, shortness of breath and change in his mentation. He refused PICC line placement today and deferred that to before starting chemotherapy.     Objective:     Vital Signs (Most Recent):  Temp: 97.4 °F (36.3 °C) (09/15/18 0817)  Pulse: 61 (09/15/18 0817)  Resp: 20 (09/15/18 0817)  BP: 125/81 (09/15/18 0817)  SpO2: (!) 89 % (09/15/18 0817) Vital Signs (24h Range):  Temp:  [97.4 °F (36.3 °C)-99.3 °F (37.4 °C)] 97.4 °F (36.3 °C)  Pulse:  [60-90] 61  Resp:  [12-23] 20  SpO2:  [89 %-99 %] 89 %  BP: (101-125)/(65-82) 125/81     Weight: 83.8 kg (184 lb 11.9 oz)  Body mass index is 25.77 kg/m².  Body surface area is 2.05 meters squared.    ECOG SCORE         ECOG Performance Status Score:  1 Restricted in physically strenuous activity but ambulatory and able to carry out work of a light or sedentary nature, e.g., light house work, office work .    Intake/Output - Last 3 Shifts       09/13 0700 - 09/14 0659 09/14 0700 - 09/15 0659 09/15 0700 - 09/16 0659    P.O.  400     Total Intake(mL/kg)  400 (4.8)     Urine (mL/kg/hr) 550 700 (0.3) 450 (1.8)    Total Output 550 700 450    Net -550 -300 -450                 Physical Exam   Constitutional: He appears well-developed and well-nourished.   HENT:   Head: Normocephalic and atraumatic.   Poor dentition, no mucositis   Eyes: EOM are normal. Pupils are equal, round, and reactive to light. No scleral icterus.   Neck: Neck supple.   Cardiovascular: Normal rate and regular rhythm.   Pulmonary/Chest: Effort normal and breath sounds normal. No respiratory distress. He has no wheezes.   On nasal cannula   Abdominal: Soft. He exhibits no distension. There is no tenderness.   Musculoskeletal: Normal range of  motion. He exhibits no edema.   Lymphadenopathy:     He has no cervical adenopathy.   Neurological: He is alert.   Skin: Skin is warm and dry.   Psychiatric: He has a normal mood and affect. His behavior is normal.       Significant Labs:   CBC:   Recent Labs   Lab  09/13/18 2056  09/14/18   0718  09/15/18   0512   WBC  5.76  5.99  8.99   HGB  8.0*  7.6*  6.8*   HCT  24.3*  23.1*  21.6*   PLT  31*  28*  25*   , CMP:   Recent Labs   Lab  09/13/18   1014  09/14/18   0718  09/15/18   0512   NA  135*  139  139   K  3.4*  3.2*  3.9   CL  95  99  105   CO2  32*  30*  25   GLU  111*  100  115*   BUN  12  14  12   CREATININE  1.3  1.2  1.0   CALCIUM  9.0  9.0  8.6*   PROT  7.6  6.9  6.2   ALBUMIN  3.2*  2.8*  2.6*   BILITOT  0.7  0.6  0.6  0.4   ALKPHOS  68  71  64   AST  97*  95*  69*   ALT  86*  98*  86*   ANIONGAP  8  10  9   EGFRNONAA  59.3*  >60.0  >60.0   , Haptoglobin:   Recent Labs   Lab  09/14/18 0718   HAPTOGLOBIN  373*    and Immunology: No results for input(s): SPEP, LIZA, JEMIMA, FREELAMBDALI in the last 48 hours.    Diagnostic Results:  I have reviewed all pertinent imaging results/findings within the past 24 hours.    Assessment/Plan:     * Acute leukemia    - Peripheral smear with 70% blasts  - HBC core total Ab and G6PD in process   - BM biopsy in process, TTE In process   - Flow cytometry pending  - Start allopurinol for TLS PPx.  - Anti-microbial prophylaxis under neutropenia  - High concern for Health illiteracy and the possible for medical and follow up compliance  - Will have discussion with his son and himself upon finalizing the BMB results         Solitary nodule of right lobe of thyroid    - Finding in the Cheat CTA, Enlargement and heterogeneity of the right thyroid lobe.  - No acute intervention at his point, will defer to other time after his leukemia plan started.         Other hyperlipidemia    - Hold home statin, plan to resume on discharge         Normocytic anemia    - Transfuse Hb if <  7        Neutropenia    - Neutropenic precautions  - No recorded fever here, however subjective fevers, and questionable PNA  - Initiate fluconazole, acyclovir for prophylaxis  - Continue cefepime, and Azithromycin for Lower RTI         Elevated liver enzymes    - Daily CMP. Stable no active plan         Pancytopenia    - Typed and match and consent, required 1 pRBC 9/15  - Transfuse for Hb<7 or plt<10        Thrombocytopenia    - See pancytopenia         Community acquired pneumonia of left lower lobe of lung    - Started on Cefepime and Azithromycin, plan to finish 5 days total   - Stable problem, no acute change, continue current medication.         History of CVA (cerebrovascular accident)    - Hold home ASA        Tobacco dependence    - Quit smoking 3 weeks ago  - 40 pack year smoking history, nicotine patch PRN         Essential hypertension    - Holding home amlodipine, coreg, lisinopril and HCTZ  - Continue to hold his antihypertensive medications         COPD (chronic obstructive pulmonary disease)    - Duonebs Q4H while awake  - Resume Home inhalers (albuterol, atrovent, breo)  - PRN O2 for saturation <88%  - CTA chest negative for PE             VTE Risk Mitigation (From admission, onward)        Ordered     IP VTE LOW RISK PATIENT  Once      09/13/18 2351     Place THERESE hose  Until discontinued      09/13/18 2351          Disposition: pending     Papito Simpson MD  Bone Marrow Transplant  [facility]

## 2018-09-16 PROBLEM — E87.6 HYPOKALEMIA: Status: RESOLVED | Noted: 2018-01-01 | Resolved: 2018-01-01

## 2018-09-16 NOTE — ASSESSMENT & PLAN NOTE
- Peripheral smear with 70% blasts  - HBC core total Ab and G6PD in process   - BM biopsy in process, TTE HFpEF and (EF 55-60%).   - Flow cytometry pending  - Start allopurinol for TLS PPx.  - Anti-microbial prophylaxis under neutropenia  - High concern for Health illiteracy and the possible for medical and follow up compliance  - Will have discussion with his son and himself upon finalizing the BMB results

## 2018-09-16 NOTE — ASSESSMENT & PLAN NOTE
- Holding home lisinopril and HCTZ  - Resumed amlodipine, coreg stable problem, no acute change, continue current medication.

## 2018-09-16 NOTE — PLAN OF CARE
Problem: Patient Care Overview  Goal: Plan of Care Review  Outcome: Ongoing (interventions implemented as appropriate)  Afebrile. Free from falls or injury. No complaints of pain. NS infusing at 75ml/hr. Cefepime given as scheduled. One prn breathing tx given this shift. 2L of O2 worn prn. Bed locked in lowest position, non skid socks on, call light within reach. Pt instructed to call if any assistance is needed. Vitals stable. Will cont to [place] pt.

## 2018-09-16 NOTE — PLAN OF CARE
Problem: Patient Care Overview  Goal: Plan of Care Review  Outcome: Ongoing (interventions implemented as appropriate)  Patient reports no discomfort. Denies any sob. Patient ambulating in room. Up to chair. Family at bedside. Afebrile, no falls this shift. Will cont to monitor.

## 2018-09-16 NOTE — ASSESSMENT & PLAN NOTE
- Neutropenic precautions  - No recorded fever here, however subjective fevers, and questionable PNA  - Initiate fluconazole, acyclovir for prophylaxis  - Continue cefepime, and Azithromycin for Lower RTI total of 5 days duration

## 2018-09-16 NOTE — PROGRESS NOTES
[facility]  Hematology  Bone Marrow Transplant  Progress Note    Patient Name: Kevin Crews  Admission Date: 9/13/2018  Hospital Length of Stay: 3 days  Code Status: Full Code    Subjective:     Interval History:  No acute complain and he was sleeping good with no complain, denies chest pain, shortness of breath. Has better understanding of him having leukemia (pending BMB) and willing to start his chemotherapy.     Objective:     Vital Signs (Most Recent):  Temp: 97.4 °F (36.3 °C) (09/16/18 0731)  Pulse: 62 (09/16/18 0934)  Resp: 20 (09/16/18 0934)  BP: (!) 148/82 (09/16/18 0731)  SpO2: 96 % (09/16/18 0731) Vital Signs (24h Range):  Temp:  [96.9 °F (36.1 °C)-98.9 °F (37.2 °C)] 97.4 °F (36.3 °C)  Pulse:  [59-76] 62  Resp:  [16-20] 20  SpO2:  [91 %-97 %] 96 %  BP: (115-148)/(66-85) 148/82     Weight: 83.8 kg (184 lb 11.9 oz)  Body mass index is 25.77 kg/m².  Body surface area is 2.05 meters squared.    ECOG SCORE         ECOG Performance Status Score:  1 Restricted in physically strenuous activity but ambulatory and able to carry out work of a light or sedentary nature, e.g., light house work, office work .    Intake/Output - Last 3 Shifts       09/14 0700 - 09/15 0659 09/15 0700 - 09/16 0659 09/16 0700 - 09/17 0659    P.O. 400 1920 0    I.V. (mL/kg)  1882 (22.5) 235 (2.8)    Total Intake(mL/kg) 400 (4.8) 3802 (45.4) 235 (2.8)    Urine (mL/kg/hr) 700 (0.3) 3775 (1.9) 0 (0)    Stool  0 0    Total Output 700 3775 0    Net -300 +27 +235           Stool Occurrence  1 x 1 x          Physical Exam   Constitutional: He appears well-developed and well-nourished.   HENT:   Head: Normocephalic and atraumatic.   Poor dentition, no mucositis   Eyes: EOM are normal. Pupils are equal, round, and reactive to light. No scleral icterus.   Neck: Neck supple.   Cardiovascular: Normal rate and regular rhythm.   Pulmonary/Chest: Effort normal and breath sounds normal. No respiratory distress. He has no wheezes.    Abdominal: Soft. He exhibits no distension. There is no tenderness.   Musculoskeletal: Normal range of motion. He exhibits no edema.   Lymphadenopathy:     He has no cervical adenopathy.   Neurological: He is alert.   Skin: Skin is warm and dry.   Psychiatric: He has a normal mood and affect. His behavior is normal.       Significant Labs:   CBC:   Recent Labs   Lab  09/15/18   0512  09/16/18   0444   WBC  8.99  8.80   HGB  6.8*  7.5*   HCT  21.6*  23.4*   PLT  25*  27*   , CMP:   Recent Labs   Lab  09/15/18   0512  09/16/18   0444   NA  139  142   K  3.9  4.3   CL  105  109   CO2  25  25   GLU  115*  98   BUN  12  10   CREATININE  1.0  0.8   CALCIUM  8.6*  9.0   PROT  6.2  6.5   ALBUMIN  2.6*  2.7*   BILITOT  0.4  0.4   ALKPHOS  64  64   AST  69*  50*   ALT  86*  76*   ANIONGAP  9  8   EGFRNONAA  >60.0  >60.0     Diagnostic Results:  I have reviewed all pertinent imaging results/findings within the past 24 hours.    Assessment/Plan:     * Acute leukemia    - Peripheral smear with 70% blasts  - HBC core total Ab and G6PD in process   - BM biopsy in process, TTE HFpEF and (EF 55-60%).   - Flow cytometry pending  - Start allopurinol for TLS PPx.  - Anti-microbial prophylaxis under neutropenia  - High concern for Health illiteracy and the possible for medical and follow up compliance  - Will have discussion with his son and himself upon finalizing the BMB results         Solitary nodule of right lobe of thyroid    - Finding in the Cheat CTA, Enlargement and heterogeneity of the right thyroid lobe.  - No acute intervention at his point, will defer to other time after his leukemia plan started.         Other hyperlipidemia    - Hold home statin, plan to resume on discharge         Normocytic anemia    - Transfuse Hb if < 7        Neutropenia    - Neutropenic precautions  - No recorded fever here, however subjective fevers, and questionable PNA  - Initiate fluconazole, acyclovir for prophylaxis  - Continue cefepime, and  Azithromycin for Lower RTI total of 5 days duration         Elevated liver enzymes    - Daily CMP. Stable no active plan         Pancytopenia    - Typed and match and consent, required 1 pRBC 9/15  - Transfuse for Hb<7 or plt<10        Thrombocytopenia    - See pancytopenia         Community acquired pneumonia of left lower lobe of lung    - Started on Cefepime and Azithromycin, switched to Cipro 500 mg PO BID   - Stable problem, no acute change, continue current medication.         History of CVA (cerebrovascular accident)    - Hold home ASA, in the setting of Thrombocytopenia         Tobacco dependence    - Quit smoking 3 weeks ago  - 40 pack year smoking history, nicotine patch placed         Essential hypertension    - Holding home lisinopril and HCTZ  - Resumed amlodipine, coreg stable problem, no acute change, continue current medication.         COPD (chronic obstructive pulmonary disease)    - Duonebs Q4H while awake  - Resume Home inhalers (albuterol, atrovent, breo)  - PRN O2 for saturation <88%  - CTA chest negative for PE             VTE Risk Mitigation (From admission, onward)        Ordered     IP VTE LOW RISK PATIENT  Once      09/13/18 2351     Place THERESE hose  Until discontinued      09/13/18 2351          Disposition: pending     Papito Simpson MD  Bone Marrow Transplant  [facility]

## 2018-09-16 NOTE — SUBJECTIVE & OBJECTIVE
Subjective:     Interval History:  No acute complain and he was sleeping good with no complain, denies chest pain, shortness of breath. Has better understanding of him having leukemia (pending BMB) and willing to start his chemotherapy.     Objective:     Vital Signs (Most Recent):  Temp: 97.4 °F (36.3 °C) (09/16/18 0731)  Pulse: 62 (09/16/18 0934)  Resp: 20 (09/16/18 0934)  BP: (!) 148/82 (09/16/18 0731)  SpO2: 96 % (09/16/18 0731) Vital Signs (24h Range):  Temp:  [96.9 °F (36.1 °C)-98.9 °F (37.2 °C)] 97.4 °F (36.3 °C)  Pulse:  [59-76] 62  Resp:  [16-20] 20  SpO2:  [91 %-97 %] 96 %  BP: (115-148)/(66-85) 148/82     Weight: 83.8 kg (184 lb 11.9 oz)  Body mass index is 25.77 kg/m².  Body surface area is 2.05 meters squared.    ECOG SCORE         ECOG Performance Status Score:  1 Restricted in physically strenuous activity but ambulatory and able to carry out work of a light or sedentary nature, e.g., light house work, office work .    Intake/Output - Last 3 Shifts       09/14 0700 - 09/15 0659 09/15 0700 - 09/16 0659 09/16 0700 - 09/17 0659    P.O. 400 1920 0    I.V. (mL/kg)  1882 (22.5) 235 (2.8)    Total Intake(mL/kg) 400 (4.8) 3802 (45.4) 235 (2.8)    Urine (mL/kg/hr) 700 (0.3) 3775 (1.9) 0 (0)    Stool  0 0    Total Output 700 3775 0    Net -300 +27 +235           Stool Occurrence  1 x 1 x          Physical Exam   Constitutional: He appears well-developed and well-nourished.   HENT:   Head: Normocephalic and atraumatic.   Poor dentition, no mucositis   Eyes: EOM are normal. Pupils are equal, round, and reactive to light. No scleral icterus.   Neck: Neck supple.   Cardiovascular: Normal rate and regular rhythm.   Pulmonary/Chest: Effort normal and breath sounds normal. No respiratory distress. He has no wheezes.   Abdominal: Soft. He exhibits no distension. There is no tenderness.   Musculoskeletal: Normal range of motion. He exhibits no edema.   Lymphadenopathy:     He has no cervical adenopathy.    Neurological: He is alert.   Skin: Skin is warm and dry.   Psychiatric: He has a normal mood and affect. His behavior is normal.       Significant Labs:   CBC:   Recent Labs   Lab  09/15/18   0512  09/16/18   0444   WBC  8.99  8.80   HGB  6.8*  7.5*   HCT  21.6*  23.4*   PLT  25*  27*   , CMP:   Recent Labs   Lab  09/15/18   0512  09/16/18   0444   NA  139  142   K  3.9  4.3   CL  105  109   CO2  25  25   GLU  115*  98   BUN  12  10   CREATININE  1.0  0.8   CALCIUM  8.6*  9.0   PROT  6.2  6.5   ALBUMIN  2.6*  2.7*   BILITOT  0.4  0.4   ALKPHOS  64  64   AST  69*  50*   ALT  86*  76*   ANIONGAP  9  8   EGFRNONAA  >60.0  >60.0     Diagnostic Results:  I have reviewed all pertinent imaging results/findings within the past 24 hours.

## 2018-09-17 NOTE — ASSESSMENT & PLAN NOTE
- Neutropenic precautions  - No recorded fever here, however subjective fevers, and questionable PNA, DC IV ABx   - Initiate Fluconazole, Acyclovir and Cipro for prophylaxis

## 2018-09-17 NOTE — PLAN OF CARE
MDR's with Dr Parekh.  Patient was admitted for workup of pancytopenia.  BMB performed on 9/14.  Awaiting final path to confirm type of acute leukemia.  PICC to be placed today.  Patient was seen on rounds and his wife was involved in discussion over the phone.  The patient has agreed to induction chemo.  The patient's wife is living/working in [place].  The patient is living locally with his son, who is 32 years old.  The process of induction was explained and the patient and his wife.  They were made aware that the patient will need assistance when stable for d/c.  The patient's wife stated that she will apply for FMLA and arrange to take time off of work when that time comes.  Plan to possibly start chemo later today or tomorrow.  CM will continue to follow.

## 2018-09-17 NOTE — CONSULTS
Double lumen PICC placed in right brachial vein of LU, 40cm in length with 0cm exposed and 32cm arm circumference. Lot#[id number].

## 2018-09-17 NOTE — PROCEDURES
"Kevin Crews is a 62 y.o. male patient.    Temp: 98.2 °F (36.8 °C) (09/17/18 0828)  Pulse: 75 (09/17/18 1029)  Resp: 18 (09/17/18 1029)  BP: 130/75 (09/17/18 1029)  SpO2: 97 % (09/17/18 1029)  Weight: 83.8 kg (184 lb 11.9 oz) (09/14/18 1912)  Height: 5' 11" (180.3 cm) (09/14/18 1912)    PICC  Date/Time: 9/17/2018 11:45 AM  Performed by: Marielos Loredo RN  Consent Done: Yes  Time out: Immediately prior to procedure a time out was called to verify the correct patient, procedure, equipment, support staff and site/side marked as required  Indications: med administration and vascular access  Anesthesia: local infiltration  Local anesthetic: lidocaine 1% without epinephrine  Anesthetic Total (mL): 2  Preparation: skin prepped with ChloraPrep  Skin prep agent dried: skin prep agent completely dried prior to procedure  Sterile barriers: all five maximum sterile barriers used - cap, mask, sterile gown, sterile gloves, and large sterile sheet  Hand hygiene: hand hygiene performed prior to central venous catheter insertion  Location details: right brachial  Catheter type: double lumen  Catheter size: 5 Fr  Catheter Length: 40cm    Ultrasound guidance: yes  Vessel Caliber: medium and patent, compressibility normal  Vascular Doppler: not done  Needle advanced into vessel with real time Ultrasound guidance.  Guidewire confirmed in vessel.  Image recorded and saved.  Sterile sheath used.  Number of attempts: 1  Post-procedure: blood return through all ports, chlorhexidine patch and sterile dressing applied  Technical procedures used: 3cg  Specimens: No  Implants: No  Assessment: placement verified by x-ray  Complications: none          Gabriela Do  9/17/2018    "

## 2018-09-17 NOTE — PLAN OF CARE
Problem: Physical Therapy Goal  Goal: Physical Therapy Goal  Goals to be met by: 18      Patient will increase functional independence with mobility by performin. Supine to sit with Modified [place]  2. Sit to stand transfer with Modified [place]  3. Gait  x 200 feet with Modified [place] using Single-point Cane .   4. Ascend/descend 2 stair with no Handrails Supervision using Single-point Cane .   5. Standing Lower extremity  exercise program x 20 reps per handout, with supervision     Outcome: Ongoing (interventions implemented as appropriate)  Pt demonstrated progress today with increased distance amb, increased activity tolerated and will cont to progress with PT maximize his safety and (I) with all mobility and prevent decline in functional mobility with planned chemotherapy.  Pt is highly motivated and participates well with PT.

## 2018-09-17 NOTE — ASSESSMENT & PLAN NOTE
- Started on Cefepime and Azithromycin, switched to Cipro alone   - Stable problem, no acute change, continue current medication.

## 2018-09-17 NOTE — PROGRESS NOTES
[facility]  Hematology  Bone Marrow Transplant  Progress Note    Patient Name: Kevin Crews  Admission Date: 9/13/2018  Hospital Length of Stay: 4 days  Code Status: Full Code    Subjective:     Interval History:  Over the night, the patient has no complain, no active event happened and change in the treatment plan occurred. Patient denies chest pain, SOB, difficulty in breathing, and had one bowel movement and actively ambulating. Plan for PICC line today and he agrees.     Objective:     Vital Signs (Most Recent):  Temp: 99 °F (37.2 °C) (09/17/18 0343)  Pulse: 67 (09/17/18 0343)  Resp: 18 (09/17/18 0343)  BP: 139/88 (09/17/18 0343)  SpO2: (!) 93 % (09/17/18 0343) Vital Signs (24h Range):  Temp:  [97.3 °F (36.3 °C)-99 °F (37.2 °C)] 99 °F (37.2 °C)  Pulse:  [62-67] 67  Resp:  [18-20] 18  SpO2:  [93 %-98 %] 93 %  BP: (123-148)/(78-88) 139/88     Weight: 83.8 kg (184 lb 11.9 oz)  Body mass index is 25.77 kg/m².  Body surface area is 2.05 meters squared.    ECOG SCORE         ECOG Performance Status Score:  1 Restricted in physically strenuous activity but ambulatory and able to carry out work of a light or sedentary nature, e.g., light house work, office work .    Intake/Output - Last 3 Shifts       09/15 0700 - 09/16 0659 09/16 0700 - 09/17 0659 09/17 0700 - 09/18 0659    P.O. 1920 1480     I.V. (mL/kg) 1882 (22.5) 397.5 (4.7)     Total Intake(mL/kg) 3802 (45.4) 1877.5 (22.4)     Urine (mL/kg/hr) 3775 (1.9) 1850 (0.9)     Stool 0 0     Total Output 3775 1850     Net +27 +27.5            Urine Occurrence  1 x     Stool Occurrence 1 x 1 x           Physical Exam   Constitutional: He appears well-developed and well-nourished.   HENT:   Head: Normocephalic and atraumatic.   Poor dentition, no mucositis   Eyes: EOM are normal. Pupils are equal, round, and reactive to light. No scleral icterus.   Neck: Neck supple.   Cardiovascular: Normal rate and regular rhythm.   Pulmonary/Chest: Effort normal and  breath sounds normal. No respiratory distress. He has no wheezes.   Abdominal: Soft. He exhibits no distension. There is no tenderness.   Musculoskeletal: Normal range of motion. He exhibits no edema.   Lymphadenopathy:     He has no cervical adenopathy.   Neurological: He is alert.   Skin: Skin is warm and dry.   Psychiatric: He has a normal mood and affect. His behavior is normal.       Significant Labs:   CBC:   Recent Labs   Lab  09/16/18   0444  09/17/18   0446   WBC  8.80  7.96   HGB  7.5*  8.4*   HCT  23.4*  26.0*   PLT  27*   --    , CMP:   Recent Labs   Lab  09/16/18   0444  09/17/18 0446   NA  142  140   K  4.3  4.4   CL  109  104   CO2  25  28   GLU  98  110   BUN  10  11   CREATININE  0.8  0.9   CALCIUM  9.0  9.5   PROT  6.5  7.4   ALBUMIN  2.7*  3.1*   BILITOT  0.4  0.6   ALKPHOS  64  77   AST  50*  41*   ALT  76*  74*   ANIONGAP  8  8   EGFRNONAA  >60.0  >60.0     Diagnostic Results:  I have reviewed all pertinent imaging results/findings within the past 24 hours.    Assessment/Plan:     * Acute leukemia    - Peripheral smear with 70% blasts  - HBC core total Ab and G6PD in process   - BM biopsy in process, TTE HFpEF and (EF 55-60%).   - Flow cytometry pending  - Start allopurinol for TLS PPx, no TLS on the labs   - Anti-microbial prophylaxis under neutropenia  - High concern for Health illiteracy and the possible for medical and follow up compliance  - Discussed with his Wife over the phone about overview of the chemotherapy and proceeding with induction for presumptive AML.  - Will proceed with PICC line and consult Dentist for poor dentation        Solitary nodule of right lobe of thyroid    - Finding in the Cheat CTA, Enlargement and heterogeneity of the right thyroid lobe.  - No acute intervention at his point, will defer to other time after his leukemia plan started.         Other hyperlipidemia    - Hold home statin, plan to resume on discharge         Normocytic anemia    - Transfuse Hb if <  7        Neutropenia    - Neutropenic precautions  - No recorded fever here, however subjective fevers, and questionable PNA, DC IV ABx   - Initiate Fluconazole, Acyclovir and Cipro for prophylaxis        Elevated liver enzymes    - Daily CMP. Stable no active plan         Pancytopenia    - Typed and match and consent, required 1 pRBC 9/15  - Transfuse for Hb<7 or plt<10        Thrombocytopenia    - See pancytopenia         Community acquired pneumonia of left lower lobe of lung    - Started on Cefepime and Azithromycin, switched to Cipro alone   - Stable problem, no acute change, continue current medication.         History of CVA (cerebrovascular accident)    - Hold home ASA, in the setting of Thrombocytopenia         Tobacco dependence    - Quit smoking 3 weeks ago  - 40 pack year smoking history, nicotine patch placed         Essential hypertension    - Holding home Lisinopril and HCTZ  - Resumed amlodipine, coreg stable problem, no acute change, continue current medication.         COPD (chronic obstructive pulmonary disease)    - Duonebs Q4H PRN   - Resume Home inhalers (albuterol, atrovent, breo)  - PRN O2 for saturation <88%  - CTA chest negative for PE             VTE Risk Mitigation (From admission, onward)        Ordered     IP VTE LOW RISK PATIENT  Once      09/13/18 2351     Place THERESE hose  Until discontinued      09/13/18 2351          Disposition: pending     Papito Simpson MD  Bone Marrow Transplant  [facility]

## 2018-09-17 NOTE — ASSESSMENT & PLAN NOTE
- Peripheral smear with 70% blasts  - HBC core total Ab and G6PD in process   - BM biopsy in process, TTE HFpEF and (EF 55-60%).   - Flow cytometry pending  - Start allopurinol for TLS PPx, no TLS on the labs   - Anti-microbial prophylaxis under neutropenia  - High concern for Health illiteracy and the possible for medical and follow up compliance  - Will have discussion with his son and himself upon finalizing the BMB results

## 2018-09-17 NOTE — PLAN OF CARE
Problem: Patient Care Overview  Goal: Plan of Care Review  Outcome: Ongoing (interventions implemented as appropriate)  Afebrile. Free from falls or injury. No complaints of pain. Cipro given as scheduled. 2L of O2 worn prn. Bed locked in lowest position, non skid socks on, call light within reach. Pt instructed to call if any assistance is needed. Vitals stable. Will cont to [place] pt.

## 2018-09-17 NOTE — PT/OT/SLP PROGRESS
Physical Therapy Treatment    Patient Name:  Kevin Crews   MRN:  [id number]    Recommendations:     Discharge Recommendations:  home health PT   Discharge Equipment Recommendations: none   Barriers to discharge: Inaccessible home and 2 steps to enter with no rails    Assessment:     Kevin Crews is a 62 y.o. male admitted with a medical diagnosis of Acute leukemia.  He presents with the following impairments/functional limitations:  weakness, impaired self care skills, gait instability, impaired balance, decreased lower extremity function, abnormal tone, impaired coordination, impaired cardiopulmonary response to activity Pt demonstrated progress today with increased distance amb, increased activity tolerated and will cont to progress with PT maximize his safety and (I) with all mobility and prevent decline in functional mobility with planned chemotherapy.  Pt is highly motivated and participates well with PT.       Rehab Prognosis:  good; patient would benefit from acute skilled PT services to address these deficits and reach maximum level of function.      Recent Surgery: * No surgery found *      Plan:     During this hospitalization, patient to be seen 3 x/week to address the above listed problems via gait training, therapeutic activities, therapeutic exercises, neuromuscular re-education  · Plan of Care Expires:  10/15/18   Plan of Care Reviewed with: patient    Subjective     Communicated with Aminta beltran, prior to session.  Patient found seated in b/s chair upon PT entry to room, agreeable to treatment.      Chief Complaint: no complaints  Patient comments/goals: to go home  Pain/Comfort:  · Pain Rating 1: 0/10  · Pain Rating Post-Intervention 1: 0/10    Patients cultural, spiritual, Sabianist conflicts given the current situation: none    Objective:     Patient found with: peripheral IV     General Precautions: Standard, fall, neutropenic(mask and gloves for out of room)   Orthopedic Precautions:N/A    Braces: N/A     Functional Mobility:  · Transfers:     · Sit to Stand:  supervision with no AD  · Gait: 160' x 1 with CGA and pt with decreased cassi. decreased L heel strike after swing phase of gait with noted increase L hip/knee flex and hip hike noted for swiing phase of gait.  Decreased stance time L LE.       AM-PAC 6 CLICK MOBILITY  Turning over in bed (including adjusting bedclothes, sheets and blankets)?: 4  Sitting down on and standing up from a chair with arms (e.g., wheelchair, bedside commode, etc.): 3  Moving from lying on back to sitting on the side of the bed?: 4  Moving to and from a bed to a chair (including a wheelchair)?: 3  Need to walk in hospital room?: 3  Climbing 3-5 steps with a railing?: 3  Basic Mobility Total Score: 20       Therapeutic Activities and Exercises:   Pt ed on PT POC, safety awareness, monitoring of exertion and he verbalized good understanding back to PT.  Pt instructed on and performed standing LE ex's to improve LE strength/coordination/balance: Heel raises, Mini squats, hip abd and marching in place x 15 reps each B LE's.   White board updated.    Patient left up in chair with all lines intact and call button in reach..    GOALS:   Multidisciplinary Problems     Physical Therapy Goals        Problem: Physical Therapy Goal    Goal Priority Disciplines Outcome Goal Variances Interventions   Physical Therapy Goal     PT, PT/OT Ongoing (interventions implemented as appropriate)     Description:  Goals to be met by: 18      Patient will increase functional independence with mobility by performin. Supine to sit with Modified [place]  2. Sit to stand transfer with Modified [place]  3. Gait  x 200 feet with Modified [place] using Single-point Cane .   4. Ascend/descend 2 stair with no Handrails Supervision using Single-point Cane .   5. Standing Lower extremity  exercise program x 20 reps per handout, with supervision                      Time  Tracking:     PT Received On: 09/17/18  PT Start Time: 0905     PT Stop Time: 0929  PT Total Time (min): 24 min     Billable Minutes: Gait Training 12 and Therapeutic Exercise 12    Treatment Type: Treatment  PT/PTA: PT     PTA Visit Number: 0     Paula Hurley, PT  09/17/2018

## 2018-09-17 NOTE — ASSESSMENT & PLAN NOTE
- Duonebs Q4H PRN   - Resume Home inhalers (albuterol, atrovent, breo)  - PRN O2 for saturation <88%  - CTA chest negative for PE

## 2018-09-18 PROBLEM — D64.9 NORMOCYTIC ANEMIA: Status: RESOLVED | Noted: 2018-01-01 | Resolved: 2018-01-01

## 2018-09-18 PROBLEM — D69.6 THROMBOCYTOPENIA: Status: RESOLVED | Noted: 2018-01-01 | Resolved: 2018-01-01

## 2018-09-18 PROBLEM — D70.9 NEUTROPENIA: Status: RESOLVED | Noted: 2018-01-01 | Resolved: 2018-01-01

## 2018-09-18 PROBLEM — K08.9 POOR DENTITION: Status: ACTIVE | Noted: 2018-01-01

## 2018-09-18 PROBLEM — R76.8 HEPATITIS B CORE ANTIBODY POSITIVE: Status: ACTIVE | Noted: 2018-01-01

## 2018-09-18 NOTE — PLAN OF CARE
Problem: Patient Care Overview  Goal: Plan of Care Review  Outcome: Ongoing (interventions implemented as appropriate)  Side rails up x2; call bell in place; bed in lowest, locked position; skid proof socks on; no evidence of skin breakdown; care plan explained to patient; pt remains free of injury. Pt tolerated po, voids, ambulates in room, 2 L NC in progress.pt instructed to call as needed. PICC line placed and verified with cxr. IVs to L FA d/cd dressing applied. VSS and afebrile. Night on call md stated to hold evening dose of coreg.

## 2018-09-18 NOTE — PLAN OF CARE
Problem: Patient Care Overview  Goal: Plan of Care Review  Outcome: Ongoing (interventions implemented as appropriate)  Mr. Crews is AAO x 4. Denies pain or SOB. No episodes of N/V/D throughout shift.VSS, afebrile. O2 sats drop to 89% - 91% on room air. Patient encouraged to continue to use supplemental O2 via nasal cannula. Mr. Crews stated that he uses it sometimes, but it causes his nose to be wet. Humidifier not in use.

## 2018-09-18 NOTE — ASSESSMENT & PLAN NOTE
- Holding home Lisinopril and HCTZ, Coreg  - Continue amlodipine while IP  - Patient bradycardic. Coreg 25mg BID held starting 9/18. Resume at lower dose if he becomes hypertensive.

## 2018-09-18 NOTE — SUBJECTIVE & OBJECTIVE
Subjective:     Interval History:   VSS. Afebrile. Coreg discontinued due to bradycardia. Plan for maxillofacial CT today. Pt to be evaluated by dentist today prior to induction therapy. BMBX pathology report pending. Hep B core Ab +. Obtaining DNA viral load. Hepatology consulted. No other acute issues at this time.     Objective:     Vital Signs (Most Recent):  Temp: 97.2 °F (36.2 °C) (09/18/18 0727)  Pulse: 68 (09/18/18 0910)  Resp: 18 (09/18/18 0910)  BP: 133/89 (09/18/18 0907)  SpO2: 99 % (09/18/18 0727) Vital Signs (24h Range):  Temp:  [97.2 °F (36.2 °C)-98.3 °F (36.8 °C)] 97.2 °F (36.2 °C)  Pulse:  [65-72] 68  Resp:  [18-20] 18  SpO2:  [90 %-100 %] 99 %  BP: (116-133)/(70-89) 133/89     Weight: 82.9 kg (182 lb 10.4 oz)  Body mass index is 25.47 kg/m².  Body surface area is 2.04 meters squared.    ECOG SCORE         [unfilled]    Intake/Output - Last 3 Shifts       09/16 0700 - 09/17 0659 09/17 0700 - 09/18 0659 09/18 0700 - 09/19 0659    P.O. 1480 840     I.V. (mL/kg) 397.5 (4.7)      Total Intake(mL/kg) 1877.5 (22.4) 840 (10.1)     Urine (mL/kg/hr) 1850 (0.9) 3600 (1.8)     Stool 0      Total Output 1850 3600     Net +27.5 -2760            Urine Occurrence 1 x      Stool Occurrence 1 x            Physical Exam   Constitutional: He appears well-developed and well-nourished.   HENT:   Head: Normocephalic and atraumatic.   Poor dentition, no mucositis   Eyes: EOM are normal. Pupils are equal, round, and reactive to light. No scleral icterus.   Neck: Neck supple.   Cardiovascular: Normal rate and regular rhythm.   No murmur heard.  Pulmonary/Chest: Effort normal and breath sounds normal. No respiratory distress. He has no wheezes.   Abdominal: Soft. Bowel sounds are normal. He exhibits no distension. There is no tenderness.   Musculoskeletal: Normal range of motion. He exhibits no edema.   Lymphadenopathy:     He has no cervical adenopathy.   Neurological: He is alert.   Skin: Skin is warm and dry.    Psychiatric: He has a normal mood and affect. His behavior is normal.       Significant Labs:   CBC:   Recent Labs   Lab  09/17/18   0446  09/18/18   0400   WBC  7.96  8.10   HGB  8.4*  8.0*   HCT  26.0*  24.1*   PLT  28*  26*    and CMP:   Recent Labs   Lab  09/17/18   0446  09/18/18   0400   NA  140  140   K  4.4  3.9   CL  104  106   CO2  28  29   GLU  110  107   BUN  11  11   CREATININE  0.9  0.9   CALCIUM  9.5  9.2   PROT  7.4  7.0   ALBUMIN  3.1*  2.9*   BILITOT  0.6  0.6   ALKPHOS  77  74   AST  41*  31   ALT  74*  58*   ANIONGAP  8  5*   EGFRNONAA  >60.0  >60.0       Diagnostic Results:  I have reviewed all pertinent imaging results/findings within the past 24 hours.

## 2018-09-18 NOTE — PROGRESS NOTES
[facility]  Hematology  Bone Marrow Transplant  Progress Note    Patient Name: Kevin Crews  Admission Date: 9/13/2018  Hospital Length of Stay: 5 days  Code Status: Full Code    Subjective:     Interval History:   VSS. Afebrile. Coreg discontinued due to bradycardia. Plan for maxillofacial CT today. Pt to be evaluated by dentist today prior to induction therapy. BMBX pathology report pending. Hep B core Ab +. Obtaining DNA viral load. Hepatology consulted. No other acute issues at this time.     Objective:     Vital Signs (Most Recent):  Temp: 97.2 °F (36.2 °C) (09/18/18 0727)  Pulse: 68 (09/18/18 0910)  Resp: 18 (09/18/18 0910)  BP: 133/89 (09/18/18 0907)  SpO2: 99 % (09/18/18 0727) Vital Signs (24h Range):  Temp:  [97.2 °F (36.2 °C)-98.3 °F (36.8 °C)] 97.2 °F (36.2 °C)  Pulse:  [65-72] 68  Resp:  [18-20] 18  SpO2:  [90 %-100 %] 99 %  BP: (116-133)/(70-89) 133/89     Weight: 82.9 kg (182 lb 10.4 oz)  Body mass index is 25.47 kg/m².  Body surface area is 2.04 meters squared.    ECOG SCORE         [unfilled]    Intake/Output - Last 3 Shifts       09/16 0700 - 09/17 0659 09/17 0700 - 09/18 0659 09/18 0700 - 09/19 0659    P.O. 1480 840     I.V. (mL/kg) 397.5 (4.7)      Total Intake(mL/kg) 1877.5 (22.4) 840 (10.1)     Urine (mL/kg/hr) 1850 (0.9) 3600 (1.8)     Stool 0      Total Output 1850 3600     Net +27.5 -2760            Urine Occurrence 1 x      Stool Occurrence 1 x            Physical Exam   Constitutional: He appears well-developed and well-nourished.   HENT:   Head: Normocephalic and atraumatic.   Poor dentition, no mucositis   Eyes: EOM are normal. Pupils are equal, round, and reactive to light. No scleral icterus.   Neck: Neck supple.   Cardiovascular: Normal rate and regular rhythm.   No murmur heard.  Pulmonary/Chest: Effort normal and breath sounds normal. No respiratory distress. He has no wheezes.   Abdominal: Soft. Bowel sounds are normal. He exhibits no distension. There is no  tenderness.   Musculoskeletal: Normal range of motion. He exhibits no edema.   Lymphadenopathy:     He has no cervical adenopathy.   Neurological: He is alert.   Skin: Skin is warm and dry.   Psychiatric: He has a normal mood and affect. His behavior is normal.       Significant Labs:   CBC:   Recent Labs   Lab  09/17/18   0446  09/18/18   0400   WBC  7.96  8.10   HGB  8.4*  8.0*   HCT  26.0*  24.1*   PLT  28*  26*    and CMP:   Recent Labs   Lab  09/17/18   0446  09/18/18   0400   NA  140  140   K  4.4  3.9   CL  104  106   CO2  28  29   GLU  110  107   BUN  11  11   CREATININE  0.9  0.9   CALCIUM  9.5  9.2   PROT  7.4  7.0   ALBUMIN  3.1*  2.9*   BILITOT  0.6  0.6   ALKPHOS  77  74   AST  41*  31   ALT  74*  58*   ANIONGAP  8  5*   EGFRNONAA  >60.0  >60.0       Diagnostic Results:  I have reviewed all pertinent imaging results/findings within the past 24 hours.    Assessment/Plan:     * Acute leukemia    - Peripheral smear with 70% blasts  - Hep B core Ab +; DNA viral load pending; hepatology consulted  - G6PD WNL  - HIV negative    -  TTE HFpEF and (EF 55-60%).   - BMBX path report, NGS, FLT3, FISH, and cytogenetics pending  - Flow cytometry: The blast gate is increased with cell expression of CD13, CD7(dim), CD8(dim), and CD34. Cytoplasmic CD3, CD33(18%), and MPO(15.6%)  are negative. Blast  83.3%.  - Continue allopurinol for TLS PPx, no TLS on the labs   - Continue PPX cipro, acyclovir, fluconazole  - High concern for Health illiteracy and the possible for medical and follow up compliance  - Very poor dentition; maxillofacial CT pending; dental clearance pending; dentist to see patient today        Hepatitis B core antibody positive    - DNA viral load pending  - hepatology consulted per protocol         Poor dentition    - obtain CT maxillofacial and dental clearance prior to induction therapy  - dentist to see patient 9/18        Solitary nodule of right lobe of thyroid    - Finding in the Cheat CTA,  Enlargement and heterogeneity of the right thyroid lobe.  - No acute intervention at his point, will defer to other time after his leukemia plan started.         Other hyperlipidemia    - Hold home statin, plan to resume on discharge         Elevated liver enzymes    - Daily CMP. Improving.        Other pancytopenia    - Transfuse for Hb<7 or plt<10  - secondary to AML  - continue PPX acyclovir, cipro, fluconazole        Community acquired pneumonia of left lower lobe of lung    - Started on Cefepime and Azithromycin, switched to Cipro alone   - Stable problem, no acute change, continue current medication.         History of CVA (cerebrovascular accident)    - Hold home ASA, in the setting of Thrombocytopenia         Tobacco dependence    - Quit smoking 3 weeks ago  - 40 pack year smoking history, nicotine patch placed         Essential hypertension    - Holding home Lisinopril and HCTZ, Coreg  - Continue amlodipine while IP  - Patient bradycardic. Coreg 25mg BID held starting 9/18. Resume at lower dose if he becomes hypertensive.         COPD (chronic obstructive pulmonary disease)    - Duonebs Q4H PRN   - Resume Home inhalers (albuterol, atrovent, breo)  - PRN O2 for saturation <88%  - CTA chest negative for PE             VTE Risk Mitigation (From admission, onward)        Ordered     IP VTE LOW RISK PATIENT  Once      09/13/18 2351     Place THERESE hose  Until discontinued      09/13/18 2351          Disposition: inpatient     Re Rojas NP  Bone Marrow Transplant  [facility]

## 2018-09-18 NOTE — ASSESSMENT & PLAN NOTE
- obtain CT maxillofacial and dental clearance prior to induction therapy  - dentist to see patient 9/18

## 2018-09-18 NOTE — ASSESSMENT & PLAN NOTE
- Peripheral smear with 70% blasts  - Hep B core Ab +; DNA viral load pending; hepatology consulted  - G6PD WNL  - HIV negative    -  TTE HFpEF and (EF 55-60%).   - BMBX path report, NGS, FLT3, FISH, and cytogenetics pending  - Flow cytometry: The blast gate is increased with cell expression of CD13, CD7(dim), CD8(dim), and CD34. Cytoplasmic CD3, CD33(18%), and MPO(15.6%)  are negative. Blast  83.3%.  - Continue allopurinol for TLS PPx, no TLS on the labs   - Continue PPX cipro, acyclovir, fluconazole  - High concern for Health illiteracy and the possible for medical and follow up compliance  - Very poor dentition; maxillofacial CT pending; dental clearance pending; dentist to see patient today

## 2018-09-18 NOTE — PLAN OF CARE
Problem: Patient Care Overview  Goal: Plan of Care Review  Outcome: Ongoing (interventions implemented as appropriate)  Side rails up x2; call bell in place; bed in lowest, locked position; skid proof socks on; no evidence of skin breakdown; care plan explained to patient; pt remains free of injury. Pt tolerated po, voids, BM x 2, ambulates in room, 2 L NC in progress, pt denies pain or n/v or diarrhea. Maxillofacial CT scan completed, pt with dental consult. Pt instructed to call with any concerns or needs, VSS and afebrile.

## 2018-09-19 PROBLEM — J18.9 COMMUNITY ACQUIRED PNEUMONIA OF LEFT LOWER LOBE OF LUNG: Status: RESOLVED | Noted: 2018-01-01 | Resolved: 2018-01-01

## 2018-09-19 PROBLEM — C92.00 ACUTE MYELOID LEUKEMIA NOT HAVING ACHIEVED REMISSION: Status: ACTIVE | Noted: 2018-01-01

## 2018-09-19 PROBLEM — R74.8 ELEVATED LIVER ENZYMES: Status: RESOLVED | Noted: 2018-01-01 | Resolved: 2018-01-01

## 2018-09-19 NOTE — ASSESSMENT & PLAN NOTE
- Holding home Lisinopril and HCTZ, Coreg (on hold for being bradycardic).   - Continue Amlodipine while inpatient

## 2018-09-19 NOTE — HPI
Kevin Crews is a 62 y.o. male with COPD, HTN, CVA in 2014 (residual left-sided deficit), HTN, 40 pack year smoking history, prostate cancer s/p prostatectomy (no radiation or chemo). He was sent to hospital on 9/13/2018 for evaluation for pancytopenia. The patient states he went to PCP office 3 days PTA for medication refills, was given requisition to check blood work, and found to have severe pancytopenia. He endorsed recently 15 lbs weight loss, fever/chills, night sweats, dyspnea. Some occasional gum bleeding. Denies any other bleeding/bruising, chest pain, abdominal pain, nausea/vomiting/diarrhea.      The patient lives with his 33-year-old son. He is retired. He quit smoking 3 weeks ago. He drinks socially a few times a month, up to 6-pack of beer. He is a retired . Smokes marijuana, denies any other drug use. Denies any family history of malignancy.     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 Prior to chemo, he was checked for Hep B which he was found to be Hep B core antibody positive. Hep B surface antigen negative, Hep B core IgM negative. Hepatology consulted for further evaluation.

## 2018-09-19 NOTE — PROGRESS NOTES
Admit Assessment    Patient Identification  Kevin Crews   :  1955  Admit Date:  2018  Attending Provider:  Iram Parekh MD              Referral:   Pt was admitted to  with a diagnosis of Acute myeloid leukemia not having achieved remission, and was admitted this hospital stay due to Normocytic anemia [D64.9]  Weakness [R53.1]  SOB (shortness of breath) [R06.02]  Thrombocytopenia [D69.6]  Normocytic anemia [D64.9]  Normocytic anemia [D64.9].   is involved was referred to the Social Work Department via routine referral.  Patient presents as a 62 y.o. year old  male.    Persons interviewed: patient     Living Situation:  Pt. States that he resides at home with his son ([phone] [phone]). He states that his wife is currently living in [place], [place] where she is working full time. (Joana [phone]. Pt. Reports that he has been fairly independent with all adl's prior to admission. He reports a stroke in  but says that he has recovered thru therapy and his own will.   Lives With: child(stuart), adult Resides at [address] [place] [address] [address]  [place] [address] [place] [address], phone: [phone] (home).      Functional Status Prior  Ambulation: 0-->independent  Transferrin-->independent  Toiletin-->independent    Current or Past Agencies and Description of Services/Supplies    DME  Agency Name: unknown  Agency Phone Number: n/a  Equipment Currently Used at Home: cane, straight, shower chair    Home Health  Agency Name: none  Agency Phone Number: n/a  Services: n/a    IV Infusion  Agency Name: none  Agency Phone Number: n/a    Nutrition: oral    Outpatient Pharmacy:     [id number] - [place], [place] -  [address] [facility] AT [facility] [address]  [address] [facility]  [place] [facility] [address]  Phone: [phone] Fax: [phone]    [facility] - [place], [place]  [address] [facility] [address] [facility]  [address] [address]  Phone: [phone] Fax: [phone]      Patient Preference of agencies include: none noted    Patient/Caregiver informed of right to choose providers or agencies.  Patient provides permission to release any necessary information to [facility] and to Non[facility] agencies as needed to facilitate patient care, treatment planning, and patient discharge planning.  Written and verbal resources provided.      Coping: pt. States that he is doing well. He is in good spirits and very optimistic about his illness and desire to get better. Pt. Has a supportive family that are involved in his care.           Adjustment to Diagnosis and Treatment: appropriate      Emotional/Behavioral/Cognitive Issues: none noted            History/Current Symptoms of Anxiety/Depression: No:   History/Current Substance Use:   Social History     Tobacco Use    Smoking status: Current Every Day Smoker     Packs/day: 1.00     Years: 40.00     Pack years: 40.00     Types: Cigarettes    Smokeless tobacco: Never Used   Substance and Sexual Activity    Alcohol use: Yes     Comment: RARELY    Drug use: Yes     Types: Cocaine     Comment: HAVEN'T USED IN 2 WEEKS    Sexual activity: Not on file       Indications of Abuse/Neglect: No:   Abuse Screen  Do You Feel Unsafe at Home, Work or School?: no    Financial:  Payor/Plan Subscr  Sex Relation Sub. Ins. ID Effective Group Num   1. MEDICAID - * ALBERT MARADIAGA 1955 Male  [id number] [address] [facility]                                   [address] [address]                            Other identified concerns/needs: None at this time    Plan: to return home with his son who will assist and provide care. Pts. Wife also planning to take FMLA and will come to assist with needs once pt. dc'd after induction chemotherapy.     Interventions/Referrals: TBD  Patient/caregiver engaged in treatment planning process.     providing psychosocial  and supportive counseling, resources, education, assistance and discharge planning as appropriate.  Patient/caregiver state understanding of  available resources,  following, remains available. Provided pt. With sw'er phone # and encouraged him to call if needed. Will follow.

## 2018-09-19 NOTE — SUBJECTIVE & OBJECTIVE
Subjective:     Interval History:  Underwent CT maxillofacial and showed disease in the right side of his teeth with no abscess. Evaluated by dentist and recommendation to undergo an extensive tooth extraction for advanced periodontal disease and generalized advanced active carious decay of remaining teeth in the maxillary arch. Denies complain of chest pain, shortness of breath or change in his bowel movement.     Objective:     Vital Signs (Most Recent):  Temp: 98.7 °F (37.1 °C) (09/19/18 0343)  Pulse: 68 (09/19/18 0343)  Resp: 18 (09/19/18 0343)  BP: 124/73 (09/19/18 0343)  SpO2: (!) 90 % (09/19/18 0343) Vital Signs (24h Range):  Temp:  [97.2 °F (36.2 °C)-98.7 °F (37.1 °C)] 98.7 °F (37.1 °C)  Pulse:  [67-77] 68  Resp:  [18] 18  SpO2:  [90 %-99 %] 90 %  BP: (122-135)/(69-89) 124/73     Weight: 82.9 kg (182 lb 10.4 oz)  Body mass index is 25.47 kg/m².  Body surface area is 2.04 meters squared.    ECOG SCORE         ECOG Performance Status Score:  1 Restricted in physically strenuous activity but ambulatory and able to carry out work of a light or sedentary nature, e.g., light house work, office work .    Intake/Output - Last 3 Shifts       09/17 0700 - 09/18 0659 09/18 0700 - 09/19 0659 09/19 0700 - 09/20 0659    P.O. 840 1080     I.V. (mL/kg)       Total Intake(mL/kg) 840 (10.1) 1080 (13)     Urine (mL/kg/hr) 3600 (1.8) 1900 (1)     Stool  0     Total Output 3600 1900     Net -2760 -820            Stool Occurrence  2 x           Physical Exam   Constitutional: He appears well-developed and well-nourished.   HENT:   Head: Normocephalic and atraumatic.   Poor dentition, no mucositis   Eyes: EOM are normal. Pupils are equal, round, and reactive to light. No scleral icterus.   Neck: Neck supple.   Cardiovascular: Normal rate and regular rhythm.   Pulmonary/Chest: Effort normal and breath sounds normal. No respiratory distress. He has no wheezes.   Abdominal: Soft. He exhibits no distension. There is no tenderness.    Musculoskeletal: Normal range of motion. He exhibits no edema.   Lymphadenopathy:     He has no cervical adenopathy.   Neurological: He is alert.   Skin: Skin is warm and dry.   Psychiatric: He has a normal mood and affect. His behavior is normal.       Significant Labs:   CBC:   Recent Labs   Lab  09/18/18   0400  09/19/18   0400   WBC  8.10  9.17   HGB  8.0*  7.4*   HCT  24.1*  23.3*   PLT  26*  23*   , CMP:   Recent Labs   Lab  09/18/18   0400  09/19/18   0400   NA  140  140   K  3.9  3.9   CL  106  106   CO2  29  28   GLU  107  128*   BUN  11  10   CREATININE  0.9  0.8   CALCIUM  9.2  9.1   PROT  7.0  6.9   ALBUMIN  2.9*  2.9*   BILITOT  0.6  0.4   ALKPHOS  74  72   AST  31  31   ALT  58*  53*   ANIONGAP  5*  6*   EGFRNONAA  >60.0  >60.0     Diagnostic Results:  I have reviewed all pertinent imaging results/findings within the past 24 hours.

## 2018-09-19 NOTE — SUBJECTIVE & OBJECTIVE
Review of Systems   Constitutional: Positive for chills, fever and unexpected weight change.   HENT: Negative for mouth sores, nosebleeds and sore throat.    Respiratory: Positive for shortness of breath. Negative for cough.    Cardiovascular: Negative for chest pain and leg swelling.   Gastrointestinal: Negative for abdominal pain, blood in stool, nausea and vomiting.   Genitourinary: Negative for dysuria and hematuria.   Musculoskeletal: Negative for arthralgias and back pain.   Skin: Negative for rash.   Neurological: Negative for light-headedness and headaches.   Hematological: Negative for adenopathy. Bruises/bleeds easily.       Past Medical History:   Diagnosis Date    COPD (chronic obstructive pulmonary disease)     Heart attack 2010    Hypertension     Prostate cancer     Stroke        Past Surgical History:   Procedure Laterality Date    CORONARY ANGIOPLASTY WITH STENT PLACEMENT      EYE SURGERY      PROSTATE SURGERY         Family history of liver disease: No    Review of patient's allergies indicates:  No Known Allergies    Tobacco Use    Smoking status: Current Every Day Smoker     Packs/day: 1.00     Years: 40.00     Pack years: 40.00     Types: Cigarettes    Smokeless tobacco: Never Used   Substance and Sexual Activity    Alcohol use: Yes     Comment: RARELY    Drug use: Yes     Types: Cocaine     Comment: HAVEN'T USED IN 2 WEEKS    Sexual activity: Not on file       Medications Prior to Admission   Medication Sig Dispense Refill Last Dose    albuterol (PROVENTIL HFA) 90 mcg/actuation inhaler Inhale 1-2 puffs into the lungs every 4 (four) hours as needed for Wheezing or Shortness of Breath. 18 g 0     amLODIPine (NORVASC) 10 MG tablet Take 1 tablet (10 mg total) by mouth once daily. 90 tablet 0     aspirin 81 MG Chew Take 1 tablet (81 mg total) by mouth once daily.  0 Taking    atorvastatin (LIPITOR) 40 MG tablet Take 1 tablet (40 mg total) by mouth once daily. 90 tablet 0      carvedilol (COREG) 25 MG tablet Take 1 tablet (25 mg total) by mouth 2 (two) times daily with meals. 180 tablet 0     guaifenesin/phenylephrine HCl (MUCINEX COLD ORAL) Take 5 mLs by mouth daily as needed (cold and congestion).       hydroCHLOROthiazide (HYDRODIURIL) 25 MG tablet Take 1 tablet (25 mg total) by mouth once daily. 90 tablet 2     ipratropium (ATROVENT HFA) 17 mcg/actuation inhaler Inhale 2 puffs into the lungs every 6 (six) hours. Rescue 1 Inhaler 2     lisinopril (PRINIVIL,ZESTRIL) 40 MG tablet Take 1 tablet (40 mg total) by mouth once daily. 90 tablet 0     sulfamethoxazole-trimethoprim 800-160mg (BACTRIM DS) 800-160 mg Tab Take 1 tablet by mouth 2 (two) times daily. for 10 days 20 tablet 0        Objective:     Vital Signs (Most Recent):  Temp: 98.7 °F (37.1 °C) (09/19/18 0343)  Pulse: 68 (09/19/18 0343)  Resp: 18 (09/19/18 0343)  BP: 124/73 (09/19/18 0343)  SpO2: (!) 90 % (09/19/18 0343) Vital Signs (24h Range):  Temp:  [97.2 °F (36.2 °C)-98.7 °F (37.1 °C)] 98.7 °F (37.1 °C)  Pulse:  [67-77] 68  Resp:  [18] 18  SpO2:  [90 %-99 %] 90 %  BP: (122-135)/(69-89) 124/73     Weight: 82.9 kg (182 lb 10.4 oz) (09/18/18 0357)  Body mass index is 25.47 kg/m².    Physical Exam   Constitutional: He is oriented to person, place, and time. He appears well-developed.   HENT:   Head: Normocephalic and atraumatic.   Eyes: No scleral icterus.   Neck: Neck supple.   Cardiovascular: Normal rate. Exam reveals no gallop and no friction rub.   Abdominal: Soft. Bowel sounds are normal. He exhibits no distension and no mass. There is no tenderness. There is no rebound and no guarding. No hernia.   Musculoskeletal: He exhibits no edema or tenderness.   Neurological: He is alert and oriented to person, place, and time.   Skin: Skin is warm.   Psychiatric: He has a normal mood and affect. His behavior is normal.   Vitals reviewed.      MELD-Na score: 7 at 9/16/2018  4:44 AM  MELD score: 7 at 9/16/2018  4:44 AM  Calculated  "from:  Serum Creatinine: 0.8 mg/dL (Rounded to 1 mg/dL) at 9/16/2018  4:44 AM  Serum Sodium: 142 mmol/L (Rounded to 137 mmol/L) at 9/16/2018  4:44 AM  Total Bilirubin: 0.4 mg/dL (Rounded to 1 mg/dL) at 9/16/2018  4:44 AM  INR(ratio): 1.1 at 9/14/2018 10:31 AM  Age: 62 years    /73   Pulse 68   Temp 98.7 °F (37.1 °C)   Resp 18   Ht 5' 11" (1.803 m)   Wt 82.9 kg (182 lb 10.4 oz)   SpO2 (!) 90%   BMI 25.47 kg/m²   Lab Results   Component Value Date    WBC 9.17 09/19/2018    HGB 7.4 (L) 09/19/2018    HCT 23.3 (L) 09/19/2018    MCV 98 09/19/2018    PLT 23 (LL) 09/19/2018     Lab Results   Component Value Date    INR 1.1 09/14/2018     Lab Results   Component Value Date     09/19/2018    K 3.9 09/19/2018    CREATININE 0.8 09/19/2018     Lab Results   Component Value Date    ALBUMIN 2.9 (L) 09/19/2018    ALT 53 (H) 09/19/2018    AST 31 09/19/2018    ALKPHOS 72 09/19/2018    BILITOT 0.4 09/19/2018     No results found for: AFP  No results found for: LIPASE, AMYLASE  No results found for: TACROLIMUS    Imaging:  Reviewed and as noted in HPI.       "

## 2018-09-19 NOTE — CONSULTS
Franky Peters DDS:      I was called by nurse Paula and Dr Iram Parekh to  dental eval for patient Kevin Crews. Reviewed CT scan and performed oral examination.  It is my recommendation to remove all remaining maxillary teeth prior to high dose chemotherapy due to advanced periodontal disease and generalized advanced active carious decay of remaining teeth in the maxillary arch.  Remaining lower teeth are not in an advanced state of decay and are periodontally stable.  Recommended to scale and debride plaque and calculus of lower teeth before chemotherapy.      Coordinating with Dr Parekh and patient to have these  dental performed Thursday morning off site.    DAVID Peters DDS

## 2018-09-19 NOTE — CONSULTS
[facility]  Hepatology  Consult Note    Patient Name: Kevin Crews  MRN: [id number]  Admission Date: 9/13/2018  Hospital Length of Stay: 6 days  Attending Provider: Iram Parekh MD   Primary Care Physician: Primary Doctor No  Principal Problem:Acute myeloid leukemia not having achieved remission    Inpatient consult to Hepatology  Consult performed by: Joie Jiménez MD  Consult ordered by: Re Rojas NP  Reason for consult: hep B        Subjective:     Transplant status: No    HPI:  Kevin Crews is a 62 y.o. male with COPD, HTN, CVA in 2014 (residual left-sided deficit), HTN, 40 pack year smoking history, prostate cancer s/p prostatectomy (no radiation or chemo). He was sent to hospital on 9/13/2018 for evaluation for pancytopenia. The patient states he went to PCP office 3 days PTA for medication refills, was given requisition to check blood work, and found to have severe pancytopenia. He endorsed recently 15 lbs weight loss, fever/chills, night sweats, dyspnea. Some occasional gum bleeding. Denies any other bleeding/bruising, chest pain, abdominal pain, nausea/vomiting/diarrhea.      The patient lives with his 33-year-old son. He is retired. He quit smoking 3 weeks ago. He drinks socially a few times a month, up to 6-pack of beer. He is a retired . Smokes marijuana, denies any other drug use. Denies any family history of malignancy.     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 Prior to chemo, he was checked for Hep B which he was found to be Hep B core antibody positive. Hep B surface antigen negative, Hep B core IgM negative. Hepatology consulted for further evaluation.         Review of Systems   Constitutional: Positive for chills, fever and unexpected weight change.   HENT: Negative for mouth sores, nosebleeds and sore throat.    Respiratory: Positive for shortness of breath. Negative for cough.    Cardiovascular: Negative for chest pain and leg  swelling.   Gastrointestinal: Negative for abdominal pain, blood in stool, nausea and vomiting.   Genitourinary: Negative for dysuria and hematuria.   Musculoskeletal: Negative for arthralgias and back pain.   Skin: Negative for rash.   Neurological: Negative for light-headedness and headaches.   Hematological: Negative for adenopathy. Bruises/bleeds easily.       Past Medical History:   Diagnosis Date    COPD (chronic obstructive pulmonary disease)     Heart attack 2010    Hypertension     Prostate cancer     Stroke        Past Surgical History:   Procedure Laterality Date    CORONARY ANGIOPLASTY WITH STENT PLACEMENT      EYE SURGERY      PROSTATE SURGERY         Family history of liver disease: No    Review of patient's allergies indicates:  No Known Allergies    Tobacco Use    Smoking status: Current Every Day Smoker     Packs/day: 1.00     Years: 40.00     Pack years: 40.00     Types: Cigarettes    Smokeless tobacco: Never Used   Substance and Sexual Activity    Alcohol use: Yes     Comment: RARELY    Drug use: Yes     Types: Cocaine     Comment: HAVEN'T USED IN 2 WEEKS    Sexual activity: Not on file       Medications Prior to Admission   Medication Sig Dispense Refill Last Dose    albuterol (PROVENTIL HFA) 90 mcg/actuation inhaler Inhale 1-2 puffs into the lungs every 4 (four) hours as needed for Wheezing or Shortness of Breath. 18 g 0     amLODIPine (NORVASC) 10 MG tablet Take 1 tablet (10 mg total) by mouth once daily. 90 tablet 0     aspirin 81 MG Chew Take 1 tablet (81 mg total) by mouth once daily.  0 Taking    atorvastatin (LIPITOR) 40 MG tablet Take 1 tablet (40 mg total) by mouth once daily. 90 tablet 0     carvedilol (COREG) 25 MG tablet Take 1 tablet (25 mg total) by mouth 2 (two) times daily with meals. 180 tablet 0     guaifenesin/phenylephrine HCl (MUCINEX COLD ORAL) Take 5 mLs by mouth daily as needed (cold and congestion).       hydroCHLOROthiazide (HYDRODIURIL) 25 MG tablet  Take 1 tablet (25 mg total) by mouth once daily. 90 tablet 2     ipratropium (ATROVENT HFA) 17 mcg/actuation inhaler Inhale 2 puffs into the lungs every 6 (six) hours. Rescue 1 Inhaler 2     lisinopril (PRINIVIL,ZESTRIL) 40 MG tablet Take 1 tablet (40 mg total) by mouth once daily. 90 tablet 0     sulfamethoxazole-trimethoprim 800-160mg (BACTRIM DS) 800-160 mg Tab Take 1 tablet by mouth 2 (two) times daily. for 10 days 20 tablet 0        Objective:     Vital Signs (Most Recent):  Temp: 98.7 °F (37.1 °C) (09/19/18 0343)  Pulse: 68 (09/19/18 0343)  Resp: 18 (09/19/18 0343)  BP: 124/73 (09/19/18 0343)  SpO2: (!) 90 % (09/19/18 0343) Vital Signs (24h Range):  Temp:  [97.2 °F (36.2 °C)-98.7 °F (37.1 °C)] 98.7 °F (37.1 °C)  Pulse:  [67-77] 68  Resp:  [18] 18  SpO2:  [90 %-99 %] 90 %  BP: (122-135)/(69-89) 124/73     Weight: 82.9 kg (182 lb 10.4 oz) (09/18/18 0357)  Body mass index is 25.47 kg/m².    Physical Exam   Constitutional: He is oriented to person, place, and time. He appears well-developed.   HENT:   Head: Normocephalic and atraumatic.   Eyes: No scleral icterus.   Neck: Neck supple.   Cardiovascular: Normal rate. Exam reveals no gallop and no friction rub.   Abdominal: Soft. Bowel sounds are normal. He exhibits no distension and no mass. There is no tenderness. There is no rebound and no guarding. No hernia.   Musculoskeletal: He exhibits no edema or tenderness.   Neurological: He is alert and oriented to person, place, and time.   Skin: Skin is warm.   Psychiatric: He has a normal mood and affect. His behavior is normal.   Vitals reviewed.      MELD-Na score: 7 at 9/16/2018  4:44 AM  MELD score: 7 at 9/16/2018  4:44 AM  Calculated from:  Serum Creatinine: 0.8 mg/dL (Rounded to 1 mg/dL) at 9/16/2018  4:44 AM  Serum Sodium: 142 mmol/L (Rounded to 137 mmol/L) at 9/16/2018  4:44 AM  Total Bilirubin: 0.4 mg/dL (Rounded to 1 mg/dL) at 9/16/2018  4:44 AM  INR(ratio): 1.1 at 9/14/2018 10:31 AM  Age: 62 years    BP  "124/73   Pulse 68   Temp 98.7 °F (37.1 °C)   Resp 18   Ht 5' 11" (1.803 m)   Wt 82.9 kg (182 lb 10.4 oz)   SpO2 (!) 90%   BMI 25.47 kg/m²    Lab Results   Component Value Date    WBC 9.17 09/19/2018    HGB 7.4 (L) 09/19/2018    HCT 23.3 (L) 09/19/2018    MCV 98 09/19/2018    PLT 23 (LL) 09/19/2018     Lab Results   Component Value Date    INR 1.1 09/14/2018     Lab Results   Component Value Date     09/19/2018    K 3.9 09/19/2018    CREATININE 0.8 09/19/2018     Lab Results   Component Value Date    ALBUMIN 2.9 (L) 09/19/2018    ALT 53 (H) 09/19/2018    AST 31 09/19/2018    ALKPHOS 72 09/19/2018    BILITOT 0.4 09/19/2018     No results found for: AFP  No results found for: LIPASE, AMYLASE  No results found for: TACROLIMUS    Imaging:  Reviewed and as noted in HPI.         Assessment/Plan:     Hepatitis B core antibody positive    Kevin Crews is a 62 y.o. 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     Recommend:  Obtain liver US with dopplers  AFP  Check Hep B DNA, Hep B e antigen and Hep B e antibody.   Prior to chemotherapy, he will need treatment of hepatitis B with Lamuvudine and will need to continue at least 6-12 months after completion of chemotherapy.  Follow up in hepatology clinic after discharge  Recommend screening/vaccination for his household.            Thank you for your consult. I will follow-up with patient. Please contact us if you have any additional questions.    Joie Jiménez MD  Hepatology  [facility]  "

## 2018-09-19 NOTE — PROGRESS NOTES
Spoke with Bety at [facility] who confirms that pt. Approved for PICC line supplies. Liaison from [facility] to meet with pt. And do teaching of line care and deliver supplies to pts. Room. Pt. Aware of the plan and in agreement. Will follow.

## 2018-09-19 NOTE — ASSESSMENT & PLAN NOTE
- Peripheral smear with 70% blasts  - Hep B core Ab +; DNA viral load pending; hepatology consulted  - G6PD WNL, HIV negative, TTE HFpEF and (EF 55-60%).   - BMBX path report, NGS, FLT3, FISH, and cytogenetics pending  - Flow cytometry: The blast gate is increased with cell expression of CD13, CD7(dim), CD8(dim), and CD34. Cytoplasmic CD3, CD33(18%), and MPO(15.6%)  are negative. Blast  83.3%.  - Continue allopurinol for TLS PPx, Continue PPX cipro, acyclovir, fluconazole  - BMB showed non M3 AML  - Evaluated by dentist and underwent CT maxillofacial and recommendations was to proceed with tooth extensive tooth extraction for advanced periodontal disease and generalized advanced active carious decay of remaining teeth in the maxillary arch.

## 2018-09-19 NOTE — TELEPHONE ENCOUNTER
After Visit Summary   2/16/2017    Teresita Hutton    MRN: [id number]           Patient Information     Date Of Birth          1985        Visit Information        Provider Department      2/16/2017 8:00 AM Nikki Castro MD [facility]        Today's Diagnoses     Encounter for supervision of other normal pregnancy in second trimester        Rh negative status in sears pregnancy in second trimester           Follow-ups after your visit        Your next 10 appointments already scheduled     Mar 16, 2017  8:00 AM CDT   ESTABL[facility] PRENATAL with Nikki Castro MD   [facility]    [address]  [place] [id number] [phone]              Who to contact     If you have questions or need follow up information about today's clinic visit or your schedule please contact [facility] directly at [phone].  Normal or non-critical lab and imaging results will be communicated to you by MyChart, letter or phone within 4 business days after the clinic has received the results. If you do not hear from us within 7 days, please contact the clinic through MyChart or phone. If you have a critical or abnormal lab result, we will notify you by phone as soon as possible.  Submit refill requests through [employer] or call your pharmacy and they will forward the refill request to us. Please allow 3 business days for your refill to be completed.          Additional Information About Your Visit        MyChart Information     [employer] gives you secure access to your electronic health record. If you see a primary care provider, you can also send messages to your care team and make appointments. If you have questions, please call your primary care clinic.  If you do not have a primary care provider, please call [phone] and they will assist you.        Care EveryWhere ID     This is your Care EveryWhere ID. This could be  ----- Message from Laurence Cohen RN sent at 9/19/2018  8:38 AM CDT -----  Contact: Laurence   Can you please schedule Mr Crews for clinic collect labs (CBC,CMP,Mg,Phos,uric acid,LDH, PT/PTT, fibrinogen, T&S) tomorrow afternoon and a chair for platelets.    Please schedule him for the same labs with a chair for blood/platelets on 9/24, 9/27.      9/24- Clinic collect  Labs, schedule appointment with Dr Parekh at 11:30.  Ok  To over ride per her.  Chair for blood.    9/27- Clinic collect labs with chair  10/1- Clinic collect labs, hospital readmit appt with Dr Parekh.    Please submit for a bed request on 10/1.      Thanks   Thanks       Admit done.   used by other organizations to access your [facility] medical records  [id number]        Your Vitals Were     Pulse Temperature Last Period Pulse Oximetry BMI (Body Mass Index)       84 98.4  F (36.9  C) (Temporal) 09/03/2016 (Exact Date) 16% 34.72 kg/m2        Blood Pressure from Last 3 Encounters:   02/16/17 108/66   01/19/17 112/66   12/22/16 102/60    Weight from Last 3 Encounters:   02/16/17 196 lb (88.9 kg)   01/19/17 186 lb (84.4 kg)   12/22/16 184 lb (83.5 kg)              We Performed the Following     ABO/Rh type and screen     Anti Treponema     Glucose tolerance gest screen 1 hour     OB hemoglobin        Primary Care Provider Office Phone # Fax #    Nikki Aileen Castro [phone]       [facility]  [address] [facility] [address]        Thank you!     Thank you for choosing [facility]  for your care. Our goal is always to provide you with excellent care. Hearing back from our patients is one way we can continue to improve our services. Please take a few minutes to complete the written survey that you may receive in the mail after your visit with us. Thank you!             Your Updated Medication List - Protect others around you: Learn how to safely use, store and throw away your medicines at www.disposemymeds.org.          This list is accurate as of: 2/16/17  9:47 AM.  Always use your most recent med list.                   Brand Name Dispense Instructions for use    PRENATAL 1 PO      Take 1 tablet by mouth daily

## 2018-09-19 NOTE — PROGRESS NOTES
[facility]  Hematology  Bone Marrow Transplant  Progress Note    Patient Name: Kevin Crews  Admission Date: 9/13/2018  Hospital Length of Stay: 6 days  Code Status: Full Code    Subjective:     Interval History:  Underwent CT maxillofacial and showed disease in the right side of his teeth with no abscess. Evaluated by dentist and recommendation to undergo an extensive tooth extraction for advanced periodontal disease and generalized advanced active carious decay of remaining teeth in the maxillary arch. Denies complain of chest pain, shortness of breath or change in his bowel movement.     Objective:     Vital Signs (Most Recent):  Temp: 98.7 °F (37.1 °C) (09/19/18 0343)  Pulse: 68 (09/19/18 0343)  Resp: 18 (09/19/18 0343)  BP: 124/73 (09/19/18 0343)  SpO2: (!) 90 % (09/19/18 0343) Vital Signs (24h Range):  Temp:  [97.2 °F (36.2 °C)-98.7 °F (37.1 °C)] 98.7 °F (37.1 °C)  Pulse:  [67-77] 68  Resp:  [18] 18  SpO2:  [90 %-99 %] 90 %  BP: (122-135)/(69-89) 124/73     Weight: 82.9 kg (182 lb 10.4 oz)  Body mass index is 25.47 kg/m².  Body surface area is 2.04 meters squared.    ECOG SCORE         ECOG Performance Status Score:  1 Restricted in physically strenuous activity but ambulatory and able to carry out work of a light or sedentary nature, e.g., light house work, office work .    Intake/Output - Last 3 Shifts       09/17 0700 - 09/18 0659 09/18 0700 - 09/19 0659 09/19 0700 - 09/20 0659    P.O. 840 1080     I.V. (mL/kg)       Total Intake(mL/kg) 840 (10.1) 1080 (13)     Urine (mL/kg/hr) 3600 (1.8) 1900 (1)     Stool  0     Total Output 3600 1900     Net -2760 -820            Stool Occurrence  2 x           Physical Exam   Constitutional: He appears well-developed and well-nourished.   HENT:   Head: Normocephalic and atraumatic.   Poor dentition, no mucositis   Eyes: EOM are normal. Pupils are equal, round, and reactive to light. No scleral icterus.   Neck: Neck supple.   Cardiovascular: Normal  rate and regular rhythm.   Pulmonary/Chest: Effort normal and breath sounds normal. No respiratory distress. He has no wheezes.   Abdominal: Soft. He exhibits no distension. There is no tenderness.   Musculoskeletal: Normal range of motion. He exhibits no edema.   Lymphadenopathy:     He has no cervical adenopathy.   Neurological: He is alert.   Skin: Skin is warm and dry.   Psychiatric: He has a normal mood and affect. His behavior is normal.       Significant Labs:   CBC:   Recent Labs   Lab  09/18/18   0400  09/19/18   0400   WBC  8.10  9.17   HGB  8.0*  7.4*   HCT  24.1*  23.3*   PLT  26*  23*   , CMP:   Recent Labs   Lab  09/18/18   0400  09/19/18   0400   NA  140  140   K  3.9  3.9   CL  106  106   CO2  29  28   GLU  107  128*   BUN  11  10   CREATININE  0.9  0.8   CALCIUM  9.2  9.1   PROT  7.0  6.9   ALBUMIN  2.9*  2.9*   BILITOT  0.6  0.4   ALKPHOS  74  72   AST  31  31   ALT  58*  53*   ANIONGAP  5*  6*   EGFRNONAA  >60.0  >60.0     Diagnostic Results:  I have reviewed all pertinent imaging results/findings within the past 24 hours.    Assessment/Plan:     * Acute myeloid leukemia not having achieved remission    - Peripheral smear with 70% blasts  - Hep B core Ab +; DNA viral load pending; hepatology consulted  - G6PD WNL, HIV negative, TTE HFpEF and (EF 55-60%).   - BMBX path report, NGS, FLT3, FISH, and cytogenetics pending  - Flow cytometry: The blast gate is increased with cell expression of CD13, CD7(dim), CD8(dim), and CD34. Cytoplasmic CD3, CD33(18%), and MPO(15.6%)  are negative. Blast  83.3%.  - Continue allopurinol for TLS PPx, Continue PPX cipro, acyclovir, fluconazole  - BMB showed non M3 AML  - Evaluated by dentist and underwent CT maxillofacial and recommendations was to proceed with tooth extensive tooth extraction for advanced periodontal disease and generalized advanced active carious decay of remaining teeth in the maxillary arch.  - Plan to discharge him and follow up back for elective  admission in 7-10 days after teeth extraction   - Transfuse Plt at 3:00 am on 9/20/2018 and repeat CBC in the morning then going to his Dental work in the morning and repeate Plt Transfusion his dental work up (9/20 afternoon in the infusion center)        Hepatitis B core antibody positive    - DNA viral load pending  - Hepatology consulted per protocol   - Possible starting on Lamivudine up to 6 months after chemotherapy.         Poor dentition    - Underwent CT maxillofacial and showed disease in the right side of his teeth with no abscess.   - Evaluated by dentist and recommendation to undergo an extensive tooth extraction for advanced periodontal disease and generalized advanced active carious decay of remaining teeth in the maxillary arch.  - Plan for teeth extraction on 9/20         Solitary nodule of right lobe of thyroid    - Finding in the Cheat CTA, Enlargement and heterogeneity of the right thyroid lobe.  - No acute intervention at his point, will defer to other time after his leukemia plan started.         Other hyperlipidemia    - Hold home statin, plan to resume on discharge         Other pancytopenia    - Transfuse for Hb<7 or plt<10  - secondary to AML  - continue PPX acyclovir, cipro, fluconazole        History of CVA (cerebrovascular accident)    - Hold home ASA, in the setting of Thrombocytopenia         Tobacco dependence    - Quit smoking 3 weeks ago  - 40 pack year smoking history, nicotine patch placed         Essential hypertension    - Holding home Lisinopril and HCTZ, Coreg (on hold for being bradycardic).   - Continue Amlodipine while inpatient         COPD (chronic obstructive pulmonary disease)    - Duonebs Q4H PRN   - Resume Home inhalers (albuterol, atrovent, breo)  - PRN O2 for saturation <88%  - CTA chest negative for PE             VTE Risk Mitigation (From admission, onward)        Ordered     IP VTE LOW RISK PATIENT  Once      09/13/18 2351     Place THERESE hose  Until discontinued       09/13/18 5691          Disposition: pending     Papito Simpson MD  Bone Marrow Transplant  [facility]

## 2018-09-19 NOTE — ASSESSMENT & PLAN NOTE
- Underwent CT maxillofacial and showed disease in the right side of his teeth with no abscess.   - Evaluated by dentist and recommendation to undergo an extensive tooth extraction for advanced periodontal disease and generalized advanced active carious decay of remaining teeth in the maxillary arch.  - Plan for teeth extraction on 9/20

## 2018-09-19 NOTE — PROGRESS NOTES
Consult received to arrange for PICC line supplies. Referral for supplies made to [facility] ([phone]). Spoke with Koffi at [facility], all necessary information given and orders faxed to the office ([phone]). Supplies will be delivered to pt. Prior to dc home. Pt. Aware of the above. Will follow.

## 2018-09-19 NOTE — PLAN OF CARE
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]  Yes     Priority: 1 - High    Poor dentition [K08.9]  Yes    Hepatitis B core antibody positive [R76.8]  Yes    Solitary nodule of right lobe of thyroid [E04.1]  Yes    Other hyperlipidemia [E78.4]  Yes    Other pancytopenia [D61.818]  Yes    Essential hypertension [I10]  Yes    COPD (chronic obstructive pulmonary disease) [J44.9]  Yes    History of CVA (cerebrovascular accident) [Z86.73]  Not Applicable    Tobacco dependence [F17.200]  Yes      Resolved Hospital Problems    Diagnosis Date Resolved POA    Elevated liver enzymes [R74.8] 09/19/2018 Yes    Neutropenia [D70.9] 09/18/2018 Yes    Normocytic anemia [D64.9] 09/18/2018 Yes    RUDI (acute kidney injury) [N17.9] 09/14/2018 Yes    Thrombocytopenia [D69.6] 09/18/2018 Yes    Acute hypoxemic respiratory failure [J96.01] 09/15/2018 Yes    Community acquired pneumonia of left lower lobe of lung [J18.1] 09/19/2018 Yes    Acute renal failure [N17.9] 09/15/2018 Yes       Future Appointments   Date Time Provider Department Center   9/20/2018  1:30 PM [facility]   9/20/2018  2:30 PM [facility]   9/24/2018 10:30 AM INJECTION[facility]   9/24/2018 11:30 AM Iram Parekh MD [facility]   9/24/2018  1:00 PM [facility]   9/27/2018  1:00 PM INJECTION, [facility]   9/27/2018  2:00 PM [facility]   10/1/2018  2:30 PM INJE[facility]   10/1/2018  3:30 PM  Re Rojas NP [facility] [place]   10/2/2018 10:00 AM Gilberto Hinojosa MD [facility] [place]   10/8/2018 10:00 AM Conor Rincon MD PhD [facility] C[facility] [place]   10/10/2018 10:00 AM Juju Banda MD [facility] [place]           I have seen and examined this patient face to face today. My clinical findings that support the need for the home health skilled services and home bound status are the following:  Weakness/numbness causing balance and gait disturbance due to Malignancy/Cancer making it taxing to leave home.    Allergies:Review of patient's allergies indicates:  No Known Allergies    Diet: regular diet    Activities: activity as tolerated    Nursing:   SN to complete comprehensive assessment including routine vital signs. Instruct on disease process and s/s of complications to report to MD. Review/verify medication list sent home with the patient at time of discharge  and instruct patient/caregiver as needed. Frequency may be adjusted depending on start of care date.    Notify MD if SBP > 160 or < 90; DBP > 90 or < 50; HR > 120 or < 50; Temp > 101    MISCELLANEOUS CARE:  Home Infusion Therapy:   SN to perform Infusion Therapy/Central Line Care.  Review Central Line Care & Central Line Flush with patient.    Scrub the Hub: Prior to accessing the line, always perform a 30 second alcohol scrub  Each lumen of the central line is to be flushed at least daily with 10 mL Normal Saline and 3 mL Heparin flush (10 units/mL)  Skilled Nurse (SN) may draw blood from IV access  Blood Draw Procedure:   - Aspirate at least 5 mL of blood   - Discard   - Obtain specimen   - Change injection cap   - Flush with 20 mL Normal Saline followed by a                 3-5 mL Heparin flush (10 units/mL)  Central :   - Sterile dressing changes are done weekly and as needed.   - Use chlor-hexadine scrub to cleanse site, apply Biopatch to insertion site,       apply securement device dressing   - Injection  caps are changed weekly and after EVERY lab draw.   - If sterile gauze is under dressing to control oozing,                 dressing change must be performed every 24 hours until gauze is not needed.    Medications: Review discharge medications with patient and family and provide education.      Current Discharge Medication List      START taking these medications    Details   acyclovir (ZOVIRAX) 400 MG tablet Take 1 tablet (400 mg total) by mouth 2 (two) times daily.  Qty: 180 tablet, Refills: 3      allopurinol (ZYLOPRIM) 300 MG tablet Take 1 tablet (300 mg total) by mouth once daily. for 14 days  Qty: 14 tablet, Refills: 0    Associated Diagnoses: Acute leukemia      ciprofloxacin HCl (CIPRO) 500 MG tablet Take 1 tablet (500 mg total) by mouth every 12 (twelve) hours. for 14 days  Qty: 28 tablet, Refills: 0      fluconazole (DIFLUCAN) 200 MG Tab Take 2 tablets (400 mg total) by mouth once daily. for 14 days  Qty: 28 tablet, Refills: 0    Associated Diagnoses: Acute leukemia not having achieved remission      lamiVUDine (EPIVIR) 100 MG Tab Take 1 tablet (100 mg total) by mouth once daily.  Qty: 90 tablet, Refills: 0      nicotine (NICODERM CQ) 14 mg/24 hr Place 1 patch onto the skin once daily.  Qty: 10 patch, Refills: 0         CONTINUE these medications which have CHANGED    Details   carvedilol (COREG) 25 MG tablet Take 0.5 tablets (12.5 mg total) by mouth 2 (two) times daily with meals.  Qty: 180 tablet, Refills: 0    Associated Diagnoses: Congestive heart failure, unspecified HF chronicity, unspecified heart failure type      ipratropium (ATROVENT HFA) 17 mcg/actuation inhaler Inhale 2 puffs into the lungs 2 (two) times daily. Rescue  Qty: 1 Inhaler, Refills: 2    Associated Diagnoses: Chronic obstructive pulmonary disease, unspecified COPD type         CONTINUE these medications which have NOT CHANGED    Details   albuterol (PROVENTIL HFA) 90 mcg/actuation inhaler Inhale 1-2 puffs into the lungs every 4  (four) hours as needed for Wheezing or Shortness of Breath.  Qty: 18 g, Refills: 0    Associated Diagnoses: Chronic obstructive pulmonary disease, unspecified COPD type      amLODIPine (NORVASC) 10 MG tablet Take 1 tablet (10 mg total) by mouth once daily.  Qty: 90 tablet, Refills: 0    Associated Diagnoses: Essential hypertension      atorvastatin (LIPITOR) 40 MG tablet Take 1 tablet (40 mg total) by mouth once daily.  Qty: 90 tablet, Refills: 0    Associated Diagnoses: Hyperlipidemia, unspecified hyperlipidemia type      guaifenesin/phenylephrine HCl (MUCINEX COLD ORAL) Take 5 mLs by mouth daily as needed (cold and congestion).         STOP taking these medications       aspirin 81 MG Chew Comments:   Reason for Stopping:         hydroCHLOROthiazide (HYDRODIURIL) 25 MG tablet Comments:   Reason for Stopping:         lisinopril (PRINIVIL,ZESTRIL) 40 MG tablet Comments:   Reason for Stopping:         sulfamethoxazole-trimethoprim 800-160mg (BACTRIM DS) 800-160 mg Tab Comments:   Reason for Stopping:         fluticasone-vilanterol (BREO) 100-25 mcg/dose diskus inhaler Comments:   Reason for Stopping:               I certify that this patient is confined to his home and needs intermittent skilled nursing care.

## 2018-09-19 NOTE — PLAN OF CARE
Problem: Patient Care Overview  Goal: Plan of Care Review  Outcome: Ongoing (interventions implemented as appropriate)  Mr. Crews is AAO x 4. NAD. VSS. Mr. Crews denied episodes of pain during this shift. ANALY Peters DDS rounded on patient during this shift and explained his recommendation regarding dental care. Patient stated he would discuss transportation issues with case management. No acute issues overnight. Call bell within reach.

## 2018-09-19 NOTE — PLAN OF CARE
Problem: Patient Care Overview  Goal: Plan of Care Review  Outcome: Ongoing (interventions implemented as appropriate)  Side rails up x2; call bell in place; bed in lowest, locked position; skid proof socks on; no evidence of skin breakdown; care plan explained to patient; pt remains free of injury. Pt tolerated in am, NPO for U/S of liver, ambulates, voids, BM today, pt denies any pain, n/v or diarrhea. At time pt is SOB with activity, 02 sats @ 94% on RA. Pt received picc line care and teach on how to flush. Also received instruction from care point infusion service. Pt received 1 unit PLTs today without incident. New order for blood transfusion, Dr Simpson stated okay to given unit of blood after U/S of liver completed. VSS and afebrile. Pt to be given platelet transfusion in earlier am, and is to be d/c home in earlier am. Pt has dentist appt scheduled for 8 am. VSS and afebrile.

## 2018-09-19 NOTE — PLAN OF CARE
MDR's with Dr Parekh.  Patient has confirmed AML.  Due to the patient's poor jail a CT was performed and Dr Peters (dentist) was consulted.  Due to the high risk of infection a decision was made to delay induction to allow for the patient's upper teeth to be pulled.  Planning to d/c the patient early tomorrow morning.  The patient will have platelets tomorrow am prior to d/c.  He has an appt with Dr Peters in his [place] clinic for 8am tomorrow morning for the teeth extractions.  CM sent a message to the oncology clinic to schedule labs/chair for platelets tomorrow afternoon.  In addition f/u and twice weekly labs with a chair for transfusions.  Readmit is planned for 10/1 if the patient has adequately healed.  CM reviewed all f/u and the d/c plan with the patient.  The patient verbalized understanding and is in agreement with the plan of care.  The patient stated that his son with be available tomorrow to provide transport to his appointments.  PICC will remain in place in anticipation for upcoming induction.  MD to place HH orders for line care supplies.  Dressing changes will be performed in clinic.  SW arranging line care needs.      Future Appointments   Date Time Provider Department Center   9/20/2018  1:30 PM INJECTION[facility]   9/20/2018  2:30 PM [facility]   9/24/2018 10:30 AM INJECTION[facility] [facility]   9/24/2018 11:30 AM Iram Parekh MD [facility] HEM ONC [place]   9/24/2018  1:00 PM [facility] [place] [facility]   9/27/2018  1:00 PM INJECTION, [facility]   9/27/2018  2:00 PM [facility] [place] [facility]   10/1/2018  2:30 PM INJECTION, [facility] [facility]   10/1/2018  3:30 PM Re Rojas NP [facility] BM [facility] [facility]   10/2/2018 10:00 AM Gilberto Hinojosa MD [facility] [place]   10/8/2018 10:00 AM Conor Rincon MD PhD [facility] CARDIO [place]   10/10/2018 10:00  AM Juju Banda MD [facility] [place]        09/19/18 1246   Final Note   Assessment Type Final Discharge Note   Discharge Disposition Home   What phone number can be called within the next 1-3 days to see how you are doing after discharge? ([phone])   Hospital Follow Up  Appt(s) scheduled? Yes   Discharge plans and expectations educations in teach back method with documentation complete? Yes

## 2018-09-19 NOTE — ASSESSMENT & PLAN NOTE
Kevin Crews is a 62 y.o. 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     Recommend:  Obtain liver US with dopplers  AFP  Check Hep B DNA, Hep B e antigen and Hep B e antibody.   Prior to chemotherapy, he will need treatment of hepatitis B with Lamuvudine and will need to continue at least 6-12 months after completion of chemotherapy.  Follow up in hepatology clinic after discharge  Recommend screening/vaccination for his household.

## 2018-09-20 NOTE — PLAN OF CARE
For Patient Discharge:    The tooth extraction will happen today September 20th at 8am. The patient is to meet Dr. Peters at his office, [address] [place] [address].

## 2018-09-20 NOTE — DISCHARGE SUMMARY
[facility]  Hematology  Bone Marrow Transplant  Discharge Summary      Patient Name: Kevin Crews  MRN: [id number]  Admission Date: 9/13/2018  Hospital Length of Stay: 7 days  Discharge Date and Time:  09/20/2018 7:53 AM  Attending Physician: Iram Parekh MD   Discharging Provider: Papito Simpson MD  Primary Care Provider: Primary Doctor No    HPI: Kevin Crews is a 62-year-old -American male with COPD, HTN, CVA in 2014 (residual left-sided deficit), HTN, 40 pack year smoking history, prostate cancer s/p prostatectomy (no radiation or chemo), who presented to the hospital due to pancytopenia. The patient states he went to PCP office 3 days ago for medication refills, was given requisition to check blood work, and yesterday with those resulted, he was told to come to the ER. He endorses recently 15 lbs weight loss, fever/chills, night sweats, dyspnea. Some occasional gum bleeding. Denies any other bleeding/bruising, chest pain, abdominal pain, nausea/vomiting/diarrhea.     The patient lives with his 33-year-old son. He is retired. He quit smoking 3 weeks ago. He drinks socially a few times a month, up to 6-pack of beer. He is a retired . Smokes marijuana, denies any other drug use. Denies any family history of malignancy. On admission (9/13), CBC wit WBC count of 5.4 (63% lymophocytosis, 7% monocytosis), Hb 8.2, and plt 30.  Mild RUDI Cr 1.3 (baseline ~0.7). . Ferritin 2238. Peripheral smear shows 70% blasts. Patient to be transferred from medicine service to BMT service.    * No surgery found *     Hospital Course: Admitted for concern for leukemia and symptoms of being fatigue, weakness and decrease exercise capacity. Hem Onc consulted and he underwent BMB 9/14 biopsy which showed Findings are consistent with non-M3 acute myeloid leukemia. PICC line initially refused then placed it on 9/17/2018 for plan of induction chemotherapy. Upon evalaution prior induction, he  was found to have Hep B core Ab +. Obtaining DNA viral load. Hepatology consulted. They recommended Lamivudine to be stare td now and continue on it after his chemotherapy concluded to minimize the risk of reactivation of his Hep B. He has very poor dentition and high risk to have infected tooth, underwent CT maxillofacial and showed disease in the right side of his teeth with no abscess. Evaluated by dentist and recommendation to undergo an extensive tooth extraction for advanced periodontal disease and generalized advanced active carious decay of remaining teeth in the maxillary arch. Dentist consulted and recommended to have an extensive teeth extraction including 13 teeth to be removed (on 09/20/2018 with Dr. Peters at his office, [address] [place] [address]) and allow up to 10-12 days of healing prior induction with chemotherapy. Appointment for follow up and platelet transfusion including post Dental extraction was arranged and frequent CBC and lab checks for this week and the next one were arranged as well. Plan to have Mr. Kevin Crews back on 10/1/2018 for induction. Discharged with PICC line in place and supplies provided.     Consults (From admission, onward)        Status Ordering Provider     Inpatient consult to Hematology/Oncology  Once     Provider:  (Not yet assigned)    Completed ERASMO ARGUETA     Inpatient consult to Hepatology  Once     Provider:  (Not yet assigned)    Completed ISSAC DOYLE     [facility]  Once     Provider:  (Not yet assigned)    Completed THELMA JANE     [facility]  Once     Provider:  (Not yet assigned)    Completed DANISH PATIÑO          Vitals:    09/20/18 0340   BP: 127/76   Pulse: 64   Resp: 18   Temp: 97.7 °F (36.5 °C)       Physical Exam   Constitutional: He is oriented to person, place, and time and well-developed, well-nourished, and in no distress. No distress.   HENT:   Head: Normocephalic and atraumatic.    Right Ear: External ear normal.   Left Ear: External ear normal.   Mouth/Throat: He does not have dentures. No oral lesions. Abnormal dentition. Dental caries present. No dental abscesses.       Multiple poor dentition and dental caries    Eyes: Pupils are equal, round, and reactive to light. Right eye exhibits no discharge. Left eye exhibits no discharge.   Neck: Normal range of motion. Neck supple. No thyromegaly present.   Cardiovascular: Normal rate and regular rhythm. Exam reveals no friction rub.   No murmur heard.  Pulmonary/Chest: Effort normal and breath sounds normal. No respiratory distress. He has no wheezes.   Musculoskeletal: Normal range of motion. He exhibits no edema or deformity.   Right arm PICC line    Neurological: He is alert and oriented to person, place, and time.   Skin: He is not diaphoretic.     Significant Diagnostic Studies:   Labs:   CMP   Recent Labs   Lab  09/19/18   0400 09/20/18 0445   NA  140  138   K  3.9  4.1   CL  106  105   CO2  28  27   GLU  128*  100   BUN  10  18   CREATININE  0.8  0.9   CALCIUM  9.1  9.2   PROT  6.9  7.2   ALBUMIN  2.9*  3.1*   BILITOT  0.4  0.4   ALKPHOS  72  73   AST  31  32   ALT  53*  53*   ANIONGAP  6*  6*   ESTGFRAFRICA  >60.0  >60.0   EGFRNONAA  >60.0  >60.0   , CBC   Recent Labs   Lab  09/19/18   0400 09/20/18 0445   WBC  9.17  9.93   HGB  7.4*  8.0*   HCT  23.3*  24.6*   PLT  23*  79*    and All labs within the past 24 hours have been reviewed  Specimen (12h ago, onward)    None          Pending Diagnostic Studies:     Procedure Component Value Units Date/Time    HLA High Res Sequence Based Typing [[id number]] Collected:  09/17/18 0014    Order Status:  Sent Lab Status:  In process Updated:  09/17/18 0839    Specimen:  Blood     HLA Stem Cell Recipient Workup [[id number]] Collected:  09/16/18 0444    Order Status:  Sent Lab Status:  In process Updated:  09/17/18 0839    Specimen:  Blood     Narrative:       Collection has been rescheduled by  [facility] at 09/15/2018 23:22 Reason:   draw in am per nurse Jorden    Hepatitis B Viral DNA, Quantitative [[id number]] Collected:  09/19/18 1441    Order Status:  Sent Lab Status:  In process Updated:  09/19/18 1525    Specimen:  Blood     Hepatitis B e antibody [[id number]] Collected:  09/19/18 1441    Order Status:  Sent Lab Status:  In process Updated:  09/19/18 1525    Specimen:  Blood     Hepatitis B e antigen [[id number]] Collected:  09/19/18 1441    Order Status:  Sent Lab Status:  In process Updated:  09/19/18 1525    Specimen:  Blood         Final Active Diagnoses:    Diagnosis Date Noted POA    PRINCIPAL PROBLEM:  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] 09/14/2018 Yes    Poor dentition [K08.9] 09/18/2018 Yes    Hepatitis B core antibody positive [R76.8] 09/18/2018 Yes    Solitary nodule of right lobe of thyroid [E04.1] 09/15/2018 Yes    Other hyperlipidemia [E78.4] 09/14/2018 Yes    Other pancytopenia [D61.818] 09/13/2018 Yes    Essential hypertension [I10] 05/11/2018 Yes    COPD (chronic obstructive pulmonary disease) [J44.9] 05/11/2018 Yes    History of CVA (cerebrovascular accident) [Z86.73] 05/11/2018 Not Applicable    Tobacco dependence [F17.200] 05/11/2018 Yes      Problems Resolved During this Admission:    Diagnosis Date Noted Date Resolved POA    Elevated liver enzymes [R74.8] 09/14/2018 09/19/2018 Yes    Neutropenia [D70.9] 09/14/2018 09/18/2018 Yes    Normocytic anemia [D64.9] 09/14/2018 09/18/2018 Yes    RUDI (acute kidney injury) [N17.9] 09/13/2018 09/14/2018 Yes    Thrombocytopenia [D69.6] 09/13/2018 09/18/2018 Yes    Acute hypoxemic respiratory failure [J96.01] 05/11/2018 09/15/2018 Yes    Community acquired pneumonia of left lower lobe of lung [J18.1] 05/11/2018 09/19/2018 Yes    Acute renal failure [N17.9] 05/11/2018 09/15/2018 Yes      Discharged Condition: stable    Disposition: Home or Self Care    Follow Up:    Patient Instructions:      CBC auto  differential   Standing Status: Standing Number of Occurrences: 10 Standing Exp. Date: 09/19/19     Comprehensive metabolic panel   Standing Status: Standing Number of Occurrences: 10 Standing Exp. Date: 09/19/19     Magnesium   Standing Status: Standing Number of Occurrences: 10 Standing Exp. Date: 09/19/19     Phosphorus   Standing Status: Standing Number of Occurrences: 10 Standing Exp. Date: 09/19/19     Uric acid   Standing Status: Standing Number of Occurrences: 10 Standing Exp. Date: 09/19/19     Lactate dehydrogenase   Standing Status: Standing Number of Occurrences: 10 Standing Exp. Date: 09/19/19     Protime-INR   Standing Status: Standing Number of Occurrences: 10 Standing Exp. Date: 09/19/19     APTT   Standing Status: Standing Number of Occurrences: 10 Standing Exp. Date: 09/19/19     FIBRINOGEN   Standing Status: Standing Number of Occurrences: 10 Standing Exp. Date: 09/19/19     Type & Screen   Standing Status: Standing Number of Occurrences: 10 Standing Exp. Date: 09/19/19     Medications:  Reconciled Home Medications:      Medication List      START taking these medications    acyclovir 400 MG tablet  Commonly known as:  ZOVIRAX  Take 1 tablet (400 mg total) by mouth 2 (two) times daily.     albuterol 90 mcg/actuation inhaler  Commonly known as:  PROVENTIL HFA  Inhale 1-2 puffs into the lungs every 4 (four) hours as needed for Wheezing or Shortness of Breath.     allopurinol 300 MG tablet  Commonly known as:  ZYLOPRIM  Take 1 tablet (300 mg total) by mouth once daily. for 14 days     atorvastatin 40 MG tablet  Commonly known as:  LIPITOR  Take 1 tablet (40 mg total) by mouth once daily.     carvedilol 12.5 MG tablet  Commonly known as:  COREG  Take 1 tablet (12.5 mg total) by mouth 2 (two) times daily with meals.     ciprofloxacin HCl 500 MG tablet  Commonly known as:  CIPRO  Take 1 tablet (500 mg total) by mouth every 12 (twelve) hours. for 14 days     fluconazole 200 MG Tab  Commonly known as:   DIFLUCAN  Take 2 tablets (400 mg total) by mouth once daily. for 14 days     ipratropium 17 mcg/actuation inhaler  Commonly known as:  ATROVENT HFA  Inhale 2 puffs into the lungs 2 (two) times daily. Rescue     lamiVUDine 100 MG Tab  Commonly known as:  EPIVIR  Take 1 tablet (100 mg total) by mouth once daily.     nicotine 14 mg/24 hr  Commonly known as:  NICODERM CQ  Place 1 patch onto the skin once daily.        CONTINUE taking these medications    amLODIPine 10 MG tablet  Commonly known as:  NORVASC  Take 1 tablet (10 mg total) by mouth once daily.     MUCINEX COLD ORAL  Take 5 mLs by mouth daily as needed (cold and congestion).        STOP taking these medications    aspirin 81 MG Chew     hydroCHLOROthiazide 25 MG tablet  Commonly known as:  HYDRODIURIL     lisinopril 40 MG tablet  Commonly known as:  PRINIVIL,ZESTRIL     sulfamethoxazole-trimethoprim 800-160mg 800-160 mg Tab  Commonly known as:  BACTRIM DS            Paipto Simpson MD  Bone Marrow Transplant  [facility]

## 2018-09-20 NOTE — PLAN OF CARE
Problem: Patient Care Overview  Goal: Plan of Care Review  Outcome: Ongoing (interventions implemented as appropriate)  Pt tolerated 1 unit platelets without complication. PICC line hep locked. RTC Monday for repeat labs. VSS; NAD. Discharging via wheelchair with family at side.

## 2018-09-20 NOTE — NURSING
Pt arrived for labs from PICC.  Purple lumen flushes easily with good blood return, labs sent.  Pt now going to lunch and will RTC for platelets later.  Discharged in wheelchair with family

## 2018-09-20 NOTE — PLAN OF CARE
Problem: Patient Care Overview  Goal: Plan of Care Review  Outcome: Ongoing (interventions implemented as appropriate)  Afebrile. Free from falls or injury. No complaints of pain. One unit of PRBC's and one unit of platelets given this shift without any complications. Pt to be discharged for dental appointment this morning at 8am. Will be discharged with PICC in place. Bed locked in lowest position, non skid socks on, call light within reach. Pt instructed to call if any assistance is needed. Vitals stable. Will cont to [facility] pt.

## 2018-09-20 NOTE — PT/OT/SLP DISCHARGE
Physical Therapy Discharge Summary    Name: Kevin Crews  MRN: [id number]   Principal Problem: Acute myeloid leukemia not having achieved remission     Patient Discharged from acute Physical Therapy on 18.  Please refer to prior PT noted date on 20 for functional status.     Assessment:     Patient has not met goals.    Objective:     GOALS:   Multidisciplinary Problems     Physical Therapy Goals        Problem: Physical Therapy Goal    Goal Priority Disciplines Outcome Goal Variances Interventions   Physical Therapy Goal     PT, PT/OT Unable to achieve outcome(s) by discharge     Description:  Goals to be met by: 18      Patient will increase functional independence with mobility by performin. Supine to sit with Modified [place]  2. Sit to stand transfer with Modified [place]  3. Gait  x 200 feet with Modified [place] using Single-point Cane .   4. Ascend/descend 2 stair with no Handrails Supervision using Single-point Cane .   5. Standing Lower extremity  exercise program x 20 reps per handout, with supervision                      Reasons for Discontinuation of Therapy Services  Transfer to alternate level of care.      Plan:     Patient Discharged to: Home no PT services needed.    Paula Hurley, PT  2018

## 2018-09-20 NOTE — PLAN OF CARE
Problem: Physical Therapy Goal  Goal: Physical Therapy Goal  Goals to be met by: 18      Patient will increase functional independence with mobility by performin. Supine to sit with Modified [place]  2. Sit to stand transfer with Modified [place]  3. Gait  x 200 feet with Modified [place] using Single-point Cane .   4. Ascend/descend 2 stair with no Handrails Supervision using Single-point Cane .   5. Standing Lower extremity  exercise program x 20 reps per handout, with supervision     Outcome: Unable to achieve outcome(s) by discharge  Pt did not reach goals due to hospital d/c.

## 2018-09-20 NOTE — PLAN OF CARE
CM provided patient with a copy of his labs to bring to Dr Peters.  Address of [facility] given to patient.  Awaiting son's arrival to drive him to the appointment.

## 2018-09-21 NOTE — NURSING
Pt here for PICC flush and education, pt has no new complaints or concerns at present; reviewed procedure for flushing PICC line daily, pt reports receiving prior education before d/c from hospital, also received home supplies yesterday; PICC flushed per protocol, pt tolerated well; pt instructed to contact MD for any needs or concerns; pt declined printed AVS, verbalized understanding of all discussed and when to report next
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
## 2018-09-24 NOTE — PLAN OF CARE
Problem: Fluid Volume Deficit (Adult)  Goal: Comfort/Well Being  Patient will demonstrate the desired outcomes by discharge/transition of care.  Outcome: Ongoing (interventions implemented as appropriate)  Pt here for 1LNS, IV antibiotics and lab cultures x2, accompanied by daughter-in-law, complaints of feeling weak/tired, reports mouth improving r/t seven teeth extracted last week; discussed treatment plan for today, all questions answered and pt agrees to proceed

## 2018-09-24 NOTE — NURSING
1100- Patient arrived on the unit in a wheelchair for lab draw and dressing change.  Patient reports no issues, denies any needs at this time.  Samples collected from picc line and dressing change performed.  Both lumens heparin locked.  Patient discharged in wheelchair, headed to MD appointment and will return today for infusion.

## 2018-09-24 NOTE — TELEPHONE ENCOUNTER
----- Message from Scot Hoskins sent at 9/24/2018 11:30 AM CDT -----  Contact: Hem/Onc Lab   Will like a call in regards to re draw on pt's lab    Contact::[phone]

## 2018-09-24 NOTE — PLAN OF CARE
Problem: Patient Care Overview  Goal: Plan of Care Review  Outcome: Ongoing (interventions implemented as appropriate)  Infusions completed, labs drawn per orders, pt tolerated well; pt instructed to increase daily hydration; discussed infection prevention, when to contact MD, when to report to ER; pt instructed that blood cultures take several days for results; printed AVS given to and reviewed with pt, pt verbalized understanding of all discussed and when to report next

## 2018-09-24 NOTE — NURSING
1419  Pt to continue and complete home oral antibiotics per MD Station; discussed same with pt and caregiver, both verbalized understanding

## 2018-09-26 NOTE — TELEPHONE ENCOUNTER
----- Message from Sara Gaffney sent at 9/26/2018 12:38 PM CDT -----  Contact: [facility]/ [facility]/ [phone]  Rep called in requesting patient's treatment plan or 30 day office note.     Please call or fax to:    Fax# [phone]

## 2018-09-27 NOTE — PLAN OF CARE
Problem: Patient Care Overview  Goal: Discharge Needs Assessment  Outcome: Ongoing (interventions implemented as appropriate)  Tolerated plt transfusion well, vitals remained stable, PICC flushed hep locked capped, leaves clinic ambulatory with cane avs given no questions at this time pt instructed to contact MD office with any questions or concerns

## 2018-09-30 NOTE — ED TRIAGE NOTES
Pt started having a fever on Friday. Pt currently on PCN post dental work. Pt is supposed to start chemo on Monday but the dentist said he had to wait. Pt has sore throat and is more painful with swallowing.

## 2018-09-30 NOTE — ED PROVIDER NOTES
Encounter Date: 9/30/2018       History     Chief Complaint   Patient presents with    Fever     starting chemo tomorrow. Onset fever 2 days ago.  Sore throat.      62-year-old male with a recent diagnosis of AML not yet initiated on chemotherapy; COPD on PRN O2 at home, HTN, heart attack, stroke presents to the ED for evaluation of fever.  Patient reports a fever with T-max of 102° for the past 2 days.  Patient reports a sore throat and painful swallowing, rhinorrhea, mild nasal congestion, and a cough productive of white sputum.  He also reports a mild posterior headache.  He reports improvement in his headache and fever with Tylenol.  Patient denies chest pain, shortness of breath, abdominal pain, nausea, vomiting, diarrhea.          Review of patient's allergies indicates:   Allergen Reactions    Effient [prasugrel] Rash     Past Medical History:   Diagnosis Date    COPD (chronic obstructive pulmonary disease)     Heart attack 2010    Hypertension     Prostate cancer     Stroke      Past Surgical History:   Procedure Laterality Date    CORONARY ANGIOPLASTY WITH STENT PLACEMENT      EYE SURGERY      PROSTATE SURGERY       History reviewed. No pertinent family history.  Social History     Tobacco Use    Smoking status: Current Every Day Smoker     Packs/day: 1.00     Years: 40.00     Pack years: 40.00     Types: Cigarettes    Smokeless tobacco: Never Used   Substance Use Topics    Alcohol use: Yes     Comment: RARELY    Drug use: Yes     Types: Cocaine     Comment: HAVEN'T USED IN 2 WEEKS     Review of Systems   Constitutional: Positive for fever.   HENT: Positive for congestion, rhinorrhea and sore throat.    Respiratory: Positive for cough. Negative for shortness of breath.    Cardiovascular: Negative for chest pain.   Gastrointestinal: Negative for nausea.   Genitourinary: Negative for dysuria.   Musculoskeletal: Negative for back pain.   Skin: Negative for rash.   Neurological: Positive for  headaches. Negative for weakness.   Hematological: Does not bruise/bleed easily.       Physical Exam     Initial Vitals [09/30/18 0724]   BP Pulse Resp Temp SpO2   122/78 98 16 100.3 °F (37.9 °C) (!) 94 %      MAP       --         Physical Exam    Nursing note and vitals reviewed.  Constitutional: He appears well-developed and well-nourished.  Non-toxic appearance. He does not appear ill. No distress.   HENT:   Head: Normocephalic and atraumatic.   Nose: No rhinorrhea. Right sinus exhibits no maxillary sinus tenderness and no frontal sinus tenderness. Left sinus exhibits no maxillary sinus tenderness and no frontal sinus tenderness.   Mouth/Throat: Posterior oropharyngeal erythema (mild) present. No oropharyngeal exudate or tonsillar abscesses.   Neck: Normal range of motion. Neck supple.   Cardiovascular: Normal rate and regular rhythm. Exam reveals no gallop, no distant heart sounds and no friction rub.    No murmur heard.  Pulmonary/Chest: Effort normal and breath sounds normal. No accessory muscle usage. No tachypnea. No respiratory distress. He has no decreased breath sounds. He has no wheezes. He has no rhonchi. He has no rales.   Scattered wheezes noted in the right middle and lower lobes.   Abdominal: Soft. Normal appearance. He exhibits no distension. There is no tenderness.   Musculoskeletal: Normal range of motion.   Neurological: He is alert.   Skin: Skin is warm and dry. No rash noted. No pallor.   Psychiatric: He has a normal mood and affect. His behavior is normal.         ED Course   Procedures  Labs Reviewed   CBC W/ AUTO DIFFERENTIAL - Abnormal; Notable for the following components:       Result Value    RBC 2.28 (*)     Hemoglobin 7.3 (*)     Hematocrit 22.6 (*)     MCV 99 (*)     MCH 32.0 (*)     RDW 18.4 (*)     Platelets 34 (*)     nRBC 1 (*)     Gran% 1.0 (*)     Blasts 63.0 (*)     Platelet Estimate Decreased (*)     All other components within normal limits    Narrative:      PLT  critical  result(s) called and verbal readback obtained from JOSE ANGEL MONIQUE, 09/30/2018 09:21   COMPREHENSIVE METABOLIC PANEL - Abnormal; Notable for the following components:    Glucose 122 (*)     Albumin 3.1 (*)     ALT 60 (*)     All other components within normal limits   THROAT SCREEN, RAPID   CULTURE, STREP A,  THROAT   INFLUENZA A AND B ANTIGEN          Imaging Results          X-Ray Chest PA And Lateral (Final result)  Result time 09/30/18 08:43:45    Final result by Anton Flynn III, MD (09/30/18 08:43:45)                 Impression:      See above    No acute process seen.      Electronically signed by: Anton Flynn MD  Date:    09/30/2018  Time:    08:43             Narrative:    EXAMINATION:  XR CHEST PA AND LATERAL    CLINICAL HISTORY:  Fever, unspecified    FINDINGS:  Two views: Central line mid SVC.  Heart size is normal.  Lungs are clear.  Bones showed DJD.                                 Medical Decision Making:   History:   Old Medical Records: I decided to obtain old medical records.  Differential Diagnosis:   My differential diagnosis includes but is not limited to:  Pneumonia, strep pharyngitis, viral pharyngitis, URI, influenza  Clinical Tests:   Lab Tests: Ordered and Reviewed  Radiological Study: Ordered and Reviewed       APC / Resident Notes:   62-year-old male due to start chemotherapy tomorrow for AML presents for evaluation of fever.  Retook temp on my exam, 100.5.  Patient is nontoxic appearing and in no acute distress. Physical exam notable for wheezes in the right middle and lower lobes.  Initial O2 sat 94% on room air.  Patient placed on 2 L. No posterior oropharyngeal exudate or evidence of peritonsillar abscess.  Abdomen soft and nontender.     Rapid strep is negative. Patient has been on penicillin for dental infection prophylaxis prior to starting chemo.  Influenza negative. Chest x-ray negative for acute pathology.  Normal WBC count.  There is thrombocytopenia with platelet  count of 34.  Blasts noted on smear.  H/H is 7.3/22.6, with no significant change from patient's previous.  I discussed this patient with BMT.  They feel that based on patient's unremarkable workup today that he is safe for discharge and to continue with starting chemo tomorrow.  Patient agreeable plan.  I have reviewed the patient's records and discussed this case with my supervising physician.           Attending Attestation:     Physician Attestation Statement for NP/PA:   I discussed this assessment and plan of this patient with the NP/PA, but I did not personally examine the patient. The face to face encounter was performed by the NP/PA.                     Clinical Impression:   The primary encounter diagnosis was Acute febrile illness. Diagnoses of Fever and Acute URI were also pertinent to this visit.      Disposition:   Disposition: Discharged  Condition: Stable                        Felicia Royal PA-C  09/30/18 0954       Clyde Lanier MD  10/01/18 1220

## 2018-09-30 NOTE — DISCHARGE INSTRUCTIONS
Drink plenty of fluids.  Rest.  Take Tylenol every 4-6 hours to reduce pain and fever.  Gargle with warm salt water.  You may use medications such as Chloraseptic throat spray for throat pain.    Present to the infusion clinic tomorrow for chemotherapy.    Return to the ER immediately for any new or worsening symptoms.

## 2018-09-30 NOTE — ED NOTES
Dc papers given and explained to patient. All questions answered at this time. Pt left ED via wheelchair with family members. VSS and NAD noted.

## 2018-10-01 NOTE — Clinical Note
1. Start insurance auth for [employer] and ask for expedited approval2. Labs on mon/thurs with an infusion chair after3. See me 9:30am on 10/11

## 2018-10-01 NOTE — PROGRESS NOTES
Hematology and Medical Oncology   Follow Up Note     10/01/2018    Primary Oncologic Diagnosis: Acute Myeloid Leukemia    History of Present Ilness:   Kevin Crews (Kevin) is a pleasant 62 y.o.male with COPD, HTN, CVA in 2014 (residual left-sided deficit), HTN, 40 pack year smoking history, prostate cancer s/p prostatectomy (no radiation or chemo), who presented to the hospital in September due to pancytopenia.     Oncology History:   --9/13/18 On admission, CBC wit WBC count of 5.4 (63% lymophocytosis, 7% monocytosis), Hb 8.2, and plt 30.  Mild RUDI Cr 1.3 (baseline ~0.7). . Ferritin 2238. Peripheral smear shows 70% blasts.   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  --Upon evalaution prior induction, he was found to have Hep B core Ab +. DNA viral load is <10. Hepatology consulted.  --Due to concern of decayed dentition he underwent CT maxillofacial and showed disease in the right side of his teeth with no abscess. Evaluated by dentist and recommendation to undergo an extensive tooth extraction for advanced periodontal disease and generalized advanced active carious decay of remaining teeth in the maxillary arch. This took place on 09/20/2018 with Dr. Peters, plan is to allow 10-12 days of healing prior induction with chemotherapy      Interval History:   Mr. Crews was not cleared by the dental office for proceeding with chemotherapy. They have requested an additional 14 days to allow further infection to clear. He had a fever of 102, unsustained last night and was evaluated in the emergency room. Over all he has been feeling well.  Over the weekend he moved out of the motel and in with his son.     There continues to be significant amount of family drama since his hospital discharge. Both the estranged wife and son have agreed to be his care giver, but Mr. Crews has left numerous peoples homes after staying with them for a short  "amount of time. Until recently he has been a life long cocaine user.     His mouth is healing okay. He endorses taking the antibiotics as prescribed by the dentist. He is using salt water gargles with good results. He is coughing up regular sputum and using gauze to "soak up the puss".     In the last several days he has experienced worsening congection. Coughing up clear/white sputum.    He is seriously thinking about returning to [place] to live with his wife and seek treatment at [facility].     Past Medical History:   Past Medical History:   Diagnosis Date    COPD (chronic obstructive pulmonary disease)     Heart attack 2010    Hypertension     Prostate cancer     Stroke        Current Medications:   Current Outpatient Medications   Medication Sig    acyclovir (ZOVIRAX) 400 MG tablet Take 1 tablet (400 mg total) by mouth 2 (two) times daily.    albuterol (PROVENTIL HFA) 90 mcg/actuation inhaler Inhale 1-2 puffs into the lungs every 4 (four) hours as needed for Wheezing or Shortness of Breath.    allopurinol (ZYLOPRIM) 300 MG tablet Take 1 tablet (300 mg total) by mouth once daily. for 14 days    amLODIPine (NORVASC) 10 MG tablet Take 1 tablet (10 mg total) by mouth once daily.    atorvastatin (LIPITOR) 40 MG tablet Take 1 tablet (40 mg total) by mouth once daily.    carvedilol (COREG) 12.5 MG tablet Take 1 tablet (12.5 mg total) by mouth 2 (two) times daily with meals.    guaifenesin/phenylephrine HCl (MUCINEX COLD ORAL) Take 5 mLs by mouth daily as needed (cold and congestion).    ipratropium (ATROVENT HFA) 17 mcg/actuation inhaler Inhale 2 puffs into the lungs 2 (two) times daily. Rescue    lamiVUDine (EPIVIR) 100 MG Tab Take 1 tablet (100 mg total) by mouth once daily.    oxyCODONE (ROXICODONE) 5 MG immediate release tablet Take 1 tablet (5 mg total) by mouth every 4 (four) hours as needed for Pain.    penicillin v potassium (VEETID) 500 MG tablet Take one tablet by mouth four times daily    " nicotine (NICODERM CQ) 14 mg/24 hr Place 1 patch onto the skin once daily.     No current facility-administered medications for this visit.      ALLERGIES:   Review of patient's allergies indicates:  No Known Allergies    Review of Systems:     Review of Systems   Constitutional: Positive for appetite change. Negative for chills, diaphoresis, fatigue, fever and unexpected weight change.   HENT:   Positive for mouth sores. Negative for hearing loss, nosebleeds, sore throat, trouble swallowing and voice change.    Eyes: Negative for eye problems and icterus.   Respiratory: Positive for shortness of breath. Negative for chest tightness, cough, hemoptysis and wheezing.    Cardiovascular: Negative for chest pain, leg swelling and palpitations.   Gastrointestinal: Negative for abdominal distention, abdominal pain, blood in stool, diarrhea, nausea and vomiting.   Endocrine: Negative for hot flashes.   Genitourinary: Negative for bladder incontinence, difficulty urinating, dysuria and hematuria.    Musculoskeletal: Negative for arthralgias, back pain, flank pain, gait problem, myalgias, neck pain and neck stiffness.   Skin: Negative for itching, rash and wound.   Neurological: Positive for headaches. Negative for dizziness, extremity weakness, gait problem, numbness, seizures and speech difficulty.   Hematological: Negative for adenopathy. Does not bruise/bleed easily.   Psychiatric/Behavioral: Negative for confusion, depression and sleep disturbance. The patient is not nervous/anxious.         Physical Exam:     Vitals:    10/01/18 1338   BP: 109/69   Pulse: 94   Resp: 19   Temp: 98.4 °F (36.9 °C)     Physical Exam   Constitutional: He is oriented to person, place, and time. He appears well-developed and well-nourished. No distress.   HENT:   Head: Normocephalic and atraumatic.   Mouth/Throat: Oropharynx is clear and moist. No oropharyngeal exudate.   Most teeth are missing. No signs of bleeding or gingival irriatation    Eyes: Conjunctivae and EOM are normal. Pupils are equal, round, and reactive to light.   Neck: Normal range of motion. Neck supple. No JVD present. No tracheal deviation present. No thyromegaly present.   Cardiovascular: Normal rate, regular rhythm and normal heart sounds. Exam reveals no friction rub.   No murmur heard.  Pulmonary/Chest: Effort normal and breath sounds normal. No stridor. No respiratory distress. He has no wheezes. He has no rales. He exhibits no tenderness.   Abdominal: Soft. Bowel sounds are normal. He exhibits no distension. There is no tenderness. There is no rebound and no guarding.   Musculoskeletal: Normal range of motion. He exhibits no edema, tenderness or deformity.   Lymphadenopathy:     He has no axillary adenopathy.   Neurological: He is alert and oriented to person, place, and time. He displays normal reflexes. No cranial nerve deficit or sensory deficit. He exhibits normal muscle tone. Coordination normal.   Skin: Skin is warm and dry. Capillary refill takes less than 2 seconds. No rash noted. He is not diaphoretic. No erythema. No pallor.   Psychiatric: He has a normal mood and affect. His behavior is normal. Judgment and thought content normal.       ECOG Performance Status: (foot note - ECOG PS provided by Eastern Cooperative Oncology Group) 1 - Symptomatic but completely ambulatory    Karnofsky Performance Score:  80%- Normal Activity with Effort: Some Symptoms of Disease    Labs:   Lab Results   Component Value Date    WBC 9.16 09/30/2018    HGB 7.3 (L) 09/30/2018    HCT 22.6 (L) 09/30/2018    PLT 34 (LL) 09/30/2018    CHOL 91 (L) 09/13/2018    TRIG 128 09/13/2018    HDL 15 (L) 09/13/2018    ALT 60 (H) 09/30/2018    AST 36 09/30/2018     09/30/2018    K 3.7 09/30/2018     09/30/2018    CREATININE 0.9 09/30/2018    BUN 9 09/30/2018    CO2 24 09/30/2018    TSH 1.857 09/13/2018    INR 1.1 09/27/2018    HGBA1C 5.7 (H) 09/14/2018         Imaging: Previous images have  been reviewed   Assessment and Plan:     Mr. Crews is a 62 year old male with AML.    Acute Myeloid Leukemia  --FLT-3 + with normal cytogenetics  --Induction therapy was delayed due to extremely bad dentition that would pose marked infeciton risk  --Picc line pulled today in clinic  --Teeth were pulled on September 20th -- did not receive dental clearance last week. They are requesting an addition 2 week therapy delay  --Plan in twice weekly labs and transfusional support while he remains outpatient.  --At this point given the complex social situations and poorly healing dentition he is not a candidate for aggressive therapy. 7+3 induction would come with an unacceptably high risk of mortality in this scenario.   --Given FLT-3 status a strong consideration was given to 10 day Decitabine therapy, but with the ongoing active infection this is not safe  --There is [facility] phase II data supporting Sorafenib + Vidaza. We will start the insurance approval process tomorrow.     Dental Carries  --7 upper teeth removed by Dr. Peters office [address] [place] [address]  --Placed on treatment dose penicillin  --Will extend therapy with Augmentin   --Remaining 6 teeth can be removed following therapy recovery    Low Grade Fever  --Temperature of 100 while in clinic last week  --No overt signs of infections, blood cultures were negativeand give Cefepime x 1 in clinic today    CVA  --Continue to monitor     Hep B Core Ab +  --Seen by hepatology  --Continue Lamivudine     Poor social support  --He has lived with his wife, younger brother, and son all within a short time frame without a peramanent address of his own  --Daughter in law would be the primary day to day care taker  --The patient is debating if he should return to [place] for his care    Tobacco Dependence  --WiIl prescribe nicotine pathes    30 minutes were spent face to face with the patient and his  family to discuss the disease, natural history, treatment options and  survival statistics. I have provided the patient with an opportunity to ask questions and have all questions answered to his satisfaction.       he will return to clinic in 1 week for evaluation, but knows to call in the interim if symptoms change or should a problem arise.        Iram Parekh MD  Hematology and Medical Oncology  Bone Marrow Transplant  [facility]

## 2018-10-03 PROBLEM — T78.3XXA ANGIO-EDEMA: Status: ACTIVE | Noted: 2018-01-01

## 2018-10-03 NOTE — PROVIDER PROGRESS NOTES - EMERGENCY DEPT.
Encounter Date: 10/3/2018    ED Physician Progress Notes       SCRIBE NOTE: I, Lelia Avalos, am scribing for, and in the presence of,  Dr. Lanier.  Physician Statement: I, Dr. Lanier, personally performed the services described in this documentation as scribed by Lelia Avalos in my presence, and it is both accurate and complete.     Physician Note:   10:10 AM - Patient was evaluated here because of the significant swelling of the left upper and lower face. The initial concern was the antibiotic given after a recent dental procedure, however, it is learned that he has been taking lisinopril. Patient will be placed into observation to be monitored and is stable upon placement in the hospital.     10:12 AM - Discussed and consulted case with the hospitalist, who will admit the patient.

## 2018-10-03 NOTE — ED NOTES
"Pt resting in bed, awake and alert. No acute distress noted. Respirations even and unlabored. Denies any SOB or difficulty swallowing but does report "i feel like my lips are becoming more swollen." Right upper lip does appear swollen without any respiratory distress. Dr. Kaye made aware, no new orders received. Pt informed to notify staff if SOB, difficulty breathing, difficulty swallowing, or noises with breathing begin- verbalizes understanding. Family at bedside. Side rails up x2. Updated on POC- Will continue to monitor.   "

## 2018-10-03 NOTE — NURSING
Admit note: Patient arrived from ED via wheelchair with transporter. Received report from ASIA Davila. Wife at bedside. Admission completed. Patient awake and alert. Patient oriented x 4. No distress noted. No pain at this time. Tele monitor present. IV patent, hep lock. Anatoliy hose applied. Call light within reach. Will continue to monitor.

## 2018-10-03 NOTE — ED NOTES
Pt sitting up in stretcher with spouse at bedside. Swellingpresent to left side of pt's face and left lip. Pt denies difficulty breathing and shortness of breath. NAD. Will continue top monitor

## 2018-10-03 NOTE — ED NOTES
Pt noted to be sleeping in bed, awakens to voice. No acute distress noted. Respirations even and unlabored. Noted on continuous cardiac monitor, SR. Denies any pain, SOB, difficulty breathing or swallowing. + swelling noted to entire lower lip, left sided upper lip, and left side of face. No new complaints voiced. Updated on POC- continue to monitor swelling- verbalizes understanding. Family at bedside. Side rails up x2. Will continue to monitor.

## 2018-10-03 NOTE — ED TRIAGE NOTES
Kevin Crews, a 62 y.o. male presents to the ED bed 2    Triage note:  Chief Complaint   Patient presents with    Angioedema     Pt received two doses of augmentin yesterday for tooth infection (last dose was 1830) last night and patient went to sleep. Just PTA, pt woke up and was asking wife for food and she noticed swelling to right cheek/lip. He denied SOB but he stated his throat felt different.       Review of patient's allergies indicates:   Allergen Reactions    Effient [prasugrel] Rash     Past Medical History:   Diagnosis Date    COPD (chronic obstructive pulmonary disease)     Heart attack 2010    Hypertension     Leukemia     AML    Prostate cancer     Stroke

## 2018-10-03 NOTE — ED NOTES
Pt placed on 2L NC for low 90's oxygen sat. No respiratory distress noted. Will continue to monitor.

## 2018-10-03 NOTE — ED NOTES
Pt transported to OBS via wheelchair in stable condition with escort. AAox3. All personal belongings with patient.

## 2018-10-03 NOTE — H&P
Hospital Medicine  History and Physical Exam    Patient Name; Kevin Crews  MRN: [id number]  Team: Networked reference to record PCT  Shayy Harley MD  Admit Date: 10/3/2018  GLENNA 10/5/2018  Principal Problem:  Angio-edema   Patient information was obtained from patient, past medical records and ER records.   Primary care Physician: Juju Banda MD  Code status: Full Code    HPI: 62 y.o. male presented to the ER with past medical history of COPD on home O2, tobacco abuse, hypertension, MI, stroke in 2014, prostate cancer, and recent diagnosis of Acute Myeloid Leukemia.  He was in his usual state of poor health until the acute onset of localized left lower facial swelling progressing to left-sided upper and lowe lips beginning 12 hours prior to admission with gradually worsening course. Pertinent associated symptoms include recent dental extractions, antibiotic therapy for periodontal disease, and long standing use of lisinopril.    Past Medical History: Patient has a past medical history of COPD (chronic obstructive pulmonary disease), Heart attack (2010), Hypertension, Leukemia, Prostate cancer, and Stroke.    Past Surgical History: Patient has a past surgical history that includes Coronary angioplasty with stent; Prostate surgery; and Eye surgery.    Social History: Patient reports that he has been smoking cigarettes.  He has a 40.00 pack-year smoking history. he has never used smokeless tobacco. He reports that he drinks alcohol. He reports that he uses drugs. Drug: Cocaine.    Family History:  Reviewed; no pertinent family history    Medications:   No current facility-administered medications on file prior to encounter.      Current Outpatient Medications on File Prior to Encounter   Medication Sig Dispense Refill    lisinopril (PRINIVIL,ZESTRIL) 20 MG tablet Take 40 mg by mouth once daily.       acyclovir (ZOVIRAX) 400 MG tablet Take 1 tablet (400 mg total) by mouth 2 (two) times daily. 180 tablet 3     albuterol (PROVENTIL HFA) 90 mcg/actuation inhaler Inhale 1-2 puffs into the lungs every 4 (four) hours as needed for Wheezing or Shortness of Breath. 18 g 0    allopurinol (ZYLOPRIM) 300 MG tablet Take 1 tablet (300 mg total) by mouth once daily. for 14 days 14 tablet 0    amLODIPine (NORVASC) 10 MG tablet Take 1 tablet (10 mg total) by mouth once daily. 90 tablet 0    amoxicillin-clavulanate 875-125mg (AUGMENTIN) 875-125 mg per tablet Take 1 tablet by mouth 2 (two) times daily. for 7 days 14 tablet 0    atorvastatin (LIPITOR) 40 MG tablet Take 1 tablet (40 mg total) by mouth once daily. 90 tablet 0    carvedilol (COREG) 12.5 MG tablet Take 1 tablet (12.5 mg total) by mouth 2 (two) times daily with meals. 180 tablet 0    guaifenesin/phenylephrine HCl (MUCINEX COLD ORAL) Take 5 mLs by mouth daily as needed (cold and congestion).      ipratropium (ATROVENT HFA) 17 mcg/actuation inhaler Inhale 2 puffs into the lungs 2 (two) times daily. Rescue 12.9 g 2    lamiVUDine (EPIVIR) 100 MG Tab Take 1 tablet (100 mg total) by mouth once daily. 90 tablet 0    nicotine (NICODERM CQ) 14 mg/24 hr Place 1 patch onto the skin once daily. 14 patch 0    nicotine (NICODERM CQ) 14 mg/24 hr Place 1 patch onto the skin once daily. 21 patch 3    oxyCODONE (ROXICODONE) 5 MG immediate release tablet Take 1 tablet (5 mg total) by mouth every 4 (four) hours as needed for Pain. 30 tablet 0    penicillin v potassium (VEETID) 500 MG tablet Take one tablet by mouth four times daily 28 tablet 0       Allergies: Patient is allergic to effient [prasugrel].    Review of Systems   Constitutional: Negative.    HENT: Negative.    Eyes: Negative.    Respiratory: Negative.  Negative for cough and shortness of breath.    Cardiovascular: Negative.    Gastrointestinal: Negative.    Genitourinary: Negative.    Musculoskeletal: Negative.    Skin: Negative.    Neurological: Negative.  Negative for weakness.       Physical Exam:  Temp:  [97.8 °F  (36.6 °C)]   Pulse:  [67-82]   Resp:  [16-20]   BP: (107-143)/(57-81)   SpO2:  [92 %-100 %]   Body mass index is 25.38 kg/m².     Physical Exam   Constitutional:  Non-toxic appearance.   HENT:   Head: Normocephalic.   Mouth/Throat: Abnormal dentition. Dental caries present. No uvula swelling.   Left facial swelling   Eyes: Conjunctivae and lids are normal. Pupils are equal.   Neck: Neck supple.   Cardiovascular: S1 normal and S2 normal.   Pulmonary/Chest: Effort normal and breath sounds normal.   Abdominal: Soft. Normal appearance and bowel sounds are normal. There is no tenderness.   Musculoskeletal: He exhibits no edema.   Neurological: He is alert. He is not disoriented.   Skin: He is not diaphoretic. No cyanosis.   Psychiatric: Mood and affect normal.       Significant Labs and Imaging: Reviewed    Recent Labs   Lab  09/27/18   1237  09/30/18   0807  10/03/18   1102   WBC  8.51  9.16  5.88   HGB  7.5*  7.3*  7.2*   HCT  22.5*  22.6*  21.7*   PLT  18*  34*  18*     Recent Labs   Lab  09/27/18   1237  09/30/18   0807  10/03/18   1102  10/03/18   1139   NA  139  137  136   --    K  3.9  3.7  4.2   --    CL  103  101  101   --    CO2  28  24  24   --    BUN  11  9  11   --    CREATININE  0.8  0.9  0.8   --    GLU  103  122*  157*   --    CALCIUM  9.3  9.5  9.3   --    MG  2.1   --   2.1   --    PHOS  3.8   --    --    --    ALKPHOS  73  80  89   --    ALT  60*  60*  64*   --    AST  45*  36  39   --    ALBUMIN  3.2*  3.1*  2.9*   --    PROT  7.5  7.7  7.7   --    BILITOT  0.5  0.9  0.5   --    INR  1.1   --    --   1.1     A1C:   Recent Labs   Lab  09/14/18   0022   HGBA1C  5.7*     TSH:   Recent Labs   Lab  09/13/18   1014   TSH  1.857       Baselines:  Hemoglobin   Date Value Ref Range Status   10/03/2018 7.2 (L) 14.0 - 18.0 g/dL Final   09/30/2018 7.3 (L) 14.0 - 18.0 g/dL Final   09/27/2018 7.5 (L) 14.0 - 18.0 g/dL Final   09/24/2018 8.6 (L) 14.0 - 18.0 g/dL Final   09/20/2018 8.5 (L) 14.0 - 18.0 g/dL Final      Creatinine   Date Value Ref Range Status   10/03/2018 0.8 0.5 - 1.4 mg/dL Final   09/30/2018 0.9 0.5 - 1.4 mg/dL Final   09/27/2018 0.8 0.5 - 1.4 mg/dL Final   09/24/2018 1.5 (H) 0.5 - 1.4 mg/dL Final   09/20/2018 0.9 0.5 - 1.4 mg/dL Final     Significant Diagnostic Studies:  Echocardiogram: 2D echo with color flow doppler:   Results for orders placed or performed during the hospital encounter of 09/13/18   2D echo with color flow doppler   Result Value Ref Range    EF 55 55 - 65    Mitral Valve Regurgitation TRIVIAL     Est. PA Systolic Pressure 18.37     Tricuspid Valve Regurgitation TRIVIAL        Inpatient Medications prescribed for management of current Problems:   Scheduled Meds:    amLODIPine  10 mg Oral Daily    atorvastatin  40 mg Oral Daily    carvedilol  12.5 mg Oral BID WM    clindamycin  300 mg Oral Q6H    diphenhydrAMINE  25 mg Intravenous Q6H    ipratropium  2 puff Inhalation BID    lamiVUDine  100 mg Oral Daily    nicotine  1 patch Transdermal Daily    predniSONE  25 mg Oral BID     Continuous Infusions:   As Needed: acetaminophen, albuterol, HYDROcodone-acetaminophen, ondansetron, ondansetron, sodium chloride 0.9%    Active Hospital Problems    Diagnosis  POA    *Angio-edema [T78.3XXA]  Yes    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]  Yes    COPD (chronic obstructive pulmonary disease) [J44.9]  Yes    Essential hypertension [I10]  Yes    Tobacco dependence [F17.200]  Yes    History of CVA (cerebrovascular accident) [Z86.73]  Not Applicable    Coronary artery disease involving native coronary artery of native heart without angina pectoris [I25.10]  Yes      Resolved Hospital Problems   No resolved problems to display.       Overview:   Patient is a 62 y.o. male with significant past medical history of AML presented to hospital for angioedema.     Angio-edema  Treated with Solumedrol, H2 blocker, Benadryl in ED. Continuing treatment with Benadryl and  prednisone.     COPD (chronic obstructive pulmonary disease) with chronic respiratory failure  Tobacco dependence  Continuing home medications.     Essential hypertension  Coronary artery disease involving native coronary artery of native heart without angina pectoris  Continuing home medications.     Acute myeloid leukemia not having achieved remission, pancytopenia  Patient plans to start therapy here or at [facility] after dental infection clears.      DVT Prophylaxis: TEDs/SCDs  Anticoagulants   Medication Route Frequency       Discharge plan and follow up  Home or Self Care      Provider  Shyay Harley MD  Department of Hospital Medicine

## 2018-10-03 NOTE — ED PROVIDER NOTES
Encounter Date: 10/3/2018       History     Chief Complaint   Patient presents with    Angioedema     61 yo male with leukemia presents to the ED for evaluation of facial swelling. Patient reports 2 weeks ago he had multiple dental extractions and has been on amoxicillin.  He reports starting Augmentin yesterday 1st dose at 10:00 a.m. and 2nd dose last night at 10:00 p.m. secondary to dental infection.  Patient is a so slated to reinitiate chemotherapy for leukemia which is going to be started after dental infection clears.  Around 1:30 a.m. he reports onset of facial swelling that has gotten progressively worse.  Swelling started on the left side of the face around the cheeks, and has progressed to include the upper and lower lip.  He denies any trouble breathing or swallowing.  He denies any posterior oral pharyngeal swelling.             Review of patient's allergies indicates:   Allergen Reactions    Effient [prasugrel] Rash     Past Medical History:   Diagnosis Date    COPD (chronic obstructive pulmonary disease)     Heart attack 2010    Hypertension     Leukemia     AML    Prostate cancer     Stroke      Past Surgical History:   Procedure Laterality Date    CORONARY ANGIOPLASTY WITH STENT PLACEMENT      EYE SURGERY      PROSTATE SURGERY       No family history on file.  Social History     Tobacco Use    Smoking status: Current Every Day Smoker     Packs/day: 1.00     Years: 40.00     Pack years: 40.00     Types: Cigarettes    Smokeless tobacco: Never Used   Substance Use Topics    Alcohol use: Yes     Comment: RARELY    Drug use: Yes     Types: Cocaine     Comment: HAVEN'T USED IN 2 WEEKS     Review of Systems   Constitutional: Negative for fever.   HENT: Positive for facial swelling. Negative for sore throat.    Respiratory: Negative for shortness of breath.    Cardiovascular: Negative for chest pain.   Gastrointestinal: Negative for nausea.   Genitourinary: Negative for dysuria.    Musculoskeletal: Negative for back pain.   Skin: Negative for rash.   Neurological: Negative for weakness.   Hematological: Does not bruise/bleed easily.       Physical Exam     Initial Vitals [10/03/18 0519]   BP Pulse Resp Temp SpO2   118/76 70 18 97.8 °F (36.6 °C) 95 %      MAP       --         Physical Exam    Constitutional: Vital signs are normal. He appears well-developed and well-nourished. He is not diaphoretic. No distress.   HENT:   Head: Atraumatic.   Right Ear: External ear normal.   Left Ear: External ear normal.   Nose: Nose normal.   Mouth/Throat: Oropharynx is clear and moist. No oropharyngeal exudate.   Left-sided facial swelling  Lip swelling on left upper and lower lip    Posterior oropharynx appears clear,   There is swelling of the inside of the cheek on the left,      Eyes: Conjunctivae are normal.   Neck: Neck supple.   Cardiovascular: Normal rate and regular rhythm. Exam reveals no gallop and no friction rub.    No murmur heard.  Pulmonary/Chest: No respiratory distress. He has no wheezes. He has no rhonchi. He has no rales. He exhibits no tenderness.   Lungs are clear  No stridor     Abdominal: Soft. Normal appearance and bowel sounds are normal.   Musculoskeletal: Normal range of motion.   Neurological: He is alert and oriented to person, place, and time.   Skin: Skin is warm and intact.   Psychiatric: He has a normal mood and affect. His speech is normal and behavior is normal. Cognition and memory are normal.         ED Course   Procedures  Labs Reviewed - No data to display       Imaging Results    None          Medical Decision Making:   ED Management:  62-year-old male with angioedema,   No airway compromise at this time.  Symptoms started at 1:30 a.m., patient took 50 mg of Benadryl between 130 and 3:30 a.m., there has been no improvement in symptoms.   Plan:  Discussed the possibility of intubation with the patient who agrees if that is needed.   Currently patient is protecting  his airway, there is no stridor, his posterior oropharynx does not appear to be edematous, he is not complaining of trouble swallowing or breathing.     I will give Pepcid, Solu-Medrol, fluids, epinephrine and closely monitor    Patient did endorsed that he ate cereal and milk shortly before symptoms started but nothing new that he has not had before.  He did start taking Augmentin yesterday but had been taking penicillin for the past 2 weeks without any problems.    Pt was signed out to 6am ED team pending re-eval of facial swelling.                       Clinical Impression:   The encounter diagnosis was Angioedema, initial encounter.      Disposition:   Disposition: Admitted  Condition: Stable                        Ramon Meyer PA-C  10/03/18 2637

## 2018-10-04 PROBLEM — T78.3XXA ANGIO-EDEMA: Status: RESOLVED | Noted: 2018-01-01 | Resolved: 2018-01-01

## 2018-10-04 NOTE — MEDICAL/APP STUDENT
Hospital Medicine  Progress Note    Patient Name: Kevin Crews  MRN: [id number]  Team: [facility] D Shayy Harley MD  Admit Date: 10/3/2018  GLENNA 10/5/2018  Code status: Full Code    Principal Problem:  Angio-edema    Interval history:  Swelling has resolved.    Review of Systems   Constitutional: Negative for fever.   Respiratory: Negative for shortness of breath.        Physical Exam:  Temp:  [98.2 °F (36.8 °C)-98.9 °F (37.2 °C)]   Pulse:  [64-96]   Resp:  [17-20]   BP: (111-142)/(66-87)   SpO2:  [90 %-100 %]      Temp: 98.3 °F (36.8 °C) (10/04/18 0757)  Pulse: 69 (10/04/18 0849)  Resp: 20 (10/04/18 0849)  BP: 123/78 (10/04/18 0757)  SpO2: 98 % (10/04/18 0757)    Intake/Output Summary (Last 24 hours) at 10/4/2018 1026  Last data filed at 10/3/2018 1700  Gross per 24 hour   Intake --   Output 200 ml   Net -200 ml     Weight: 79.3 kg (174 lb 13.2 oz)  Body mass index is 24.38 kg/m².    Physical Exam   HENT:   Mouth/Throat: Abnormal dentition. Dental caries present.   Cardiovascular: S1 normal and S2 normal.   Pulmonary/Chest: Effort normal and breath sounds normal.   Abdominal: Soft. Normal appearance.   Musculoskeletal: He exhibits no edema.       Significant Labs:  Recent Labs   Lab  09/27/18   1237  09/30/18   0807  10/03/18   1102   WBC  8.51  9.16  5.88   HGB  7.5*  7.3*  7.2*   HCT  22.5*  22.6*  21.7*   PLT  18*  34*  18*     Recent Labs   Lab  09/27/18   1237  09/30/18   0807  10/03/18   1102  10/03/18   1139   NA  139  137  136   --    K  3.9  3.7  4.2   --    CL  103  101  101   --    CO2  28  24  24   --    BUN  11  9  11   --    CREATININE  0.8  0.9  0.8   --    GLU  103  122*  157*   --    CALCIUM  9.3  9.5  9.3   --    MG  2.1   --   2.1   --    PHOS  3.8   --    --    --    ALKPHOS  73  80  89   --    ALT  60*  60*  64*   --    AST  45*  36  39   --    ALBUMIN  3.2*  3.1*  2.9*   --    PROT  7.5  7.7  7.7   --    BILITOT  0.5  0.9  0.5   --    INR  1.1   --    --   1.1     A1C:   Recent Labs   Lab   09/14/18   0022   HGBA1C  5.7*       TSH:   Recent Labs   Lab  09/13/18   1014   TSH  1.857       Inpatient Medications prescribed for management of current Problems:   Scheduled Meds:    amLODIPine  10 mg Oral Daily    atorvastatin  40 mg Oral Daily    carvedilol  12.5 mg Oral BID WM    clindamycin  300 mg Oral Q6H    diphenhydrAMINE  25 mg Intravenous Q6H    ipratropium  2 puff Inhalation BID    lamiVUDine  100 mg Oral Daily    nicotine  1 patch Transdermal Daily    predniSONE  25 mg Oral BID     Continuous Infusions:   As Needed: acetaminophen, albuterol, HYDROcodone-acetaminophen, ondansetron, ondansetron, sodium chloride 0.9%    Active Hospital Problems    Diagnosis  POA    *Angio-edema [T78.3XXA]  Yes    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]  Yes    COPD (chronic obstructive pulmonary disease) [J44.9]  Yes    Essential hypertension [I10]  Yes    Tobacco dependence [F17.200]  Yes    History of CVA (cerebrovascular accident) [Z86.73]  Not Applicable    Coronary artery disease involving native coronary artery of native heart without angina pectoris [I25.10]  Yes      Resolved Hospital Problems   No resolved problems to display.       Overview:  Patient is a 62 y.o. male with significant past medical history of AML presented to hospital for angioedema following recent PO intake of Augmentin for dental infection with extraction. In the ED, he was managed with Solumedrol, H2 blocker & diphenhydramine. Clindamycin was started to treat ongoing dental infection.     Assessment and Plan for Problems addressed today:    Angio-edema  · Treated with Solumedrol, H2 blocker, Benadryl in ED. Continuing treatment with Benadryl and prednisone. Continued treatment for dental infection with clindamycin.     COPD (chronic obstructive pulmonary disease) with chronic respiratory failure  Tobacco dependence  · Continuing home medication: ipratropium inhaler, PRN albuterol inhaler. Nicotine  patch ordered.      Essential hypertension  Coronary artery disease involving native coronary artery of native heart without angina pectoris  · Continuing home medications: amlodipine, atorvastatin, carvedilol.     Acute myeloid leukemia not having achieved remission, pancytopenia  · Patient plans to start therapy here or at [facility] after dental infection clears.    Diet: Diet Cardiac [facility]; Dysphagia (Soft)  DVT Prophylaxis: THERESE/SCDs  Anticoagulants   Medication Route Frequency     L/D/A:  PIV    Discharge plan and follow up  Home or Self Care      Provider  Shayy Harley MD  [facility] D   Department of Hospital Medicine    Scribe Attestation: I personally scribed for Shayy Harley MD on 10/04/2018 at 10:49 AM. Electronically signed by maria de jesus Ga on 10/04/2018 at 10:49 AM.

## 2018-10-04 NOTE — PLAN OF CARE
Problem: Patient Care Overview  Goal: Plan of Care Review  Outcome: Ongoing (interventions implemented as appropriate)  Pt's VSS, NAD noted. Pt is sinus rhythm on cardiac monitor. Bed locked in lowest position, side rails upx2, call bell within reach, nonskid socks/teds on pt, pt refused scds.

## 2018-10-04 NOTE — DISCHARGE SUMMARY
Discharge Summary  Hospital Medicine    Patient Name: Kevin Crews  MRN: [id number]  Attending Provider on Discharge: Shayy Harley MD  Hospital Medicine Team: [facility] [place] [facility] MED D  Date of Admission:  10/3/2018     Date of Discharge:  10/4/2018 12:11 PM  Code status: Full Code    Active Hospital Problems    Diagnosis  POA    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]  Yes    COPD (chronic obstructive pulmonary disease) [J44.9]  Yes    Essential hypertension [I10]  Yes    Tobacco dependence [F17.200]  Yes    History of CVA (cerebrovascular accident) [Z86.73]  Not Applicable    Coronary artery disease involving native coronary artery of native heart without angina pectoris [I25.10]  Yes      Resolved Hospital Problems    Diagnosis Date Resolved POA    *Angio-edema [T78.3XXA] 10/04/2018 Yes        HPI: 62 y.o. male with past medical history of COPD on home O2, tobacco abuse, hypertension, MI, stroke in 2014, prostate cancer, and recent diagnosis of Acute Myeloid Leukemia presented with the acute onset of localized left lower facial swelling progressing to left-sided upper and lowe lips beginning 12 hours prior to admission. Pertinent associated symptoms include recent dental extractions, antibiotic therapy for periodontal disease with amoxicillin and Augmentin, and long standing use of lisinopril.    Hospital Course:  Patient with significant past medical history of AML presented to hospital for angioedema following recent treatment with Augmentin for dental infection with extraction. In the ED, he was managed with Solumedrol, H2 blocker & diphenhydramine. Clindamycin was started to treat ongoing dental infection.     Angioedema  · Treated with Solumedrol, H2 blocker, Benadryl in ED. Continued treatment with Benadryl and prednisone. Plan to continue for 3 - 4 days. Consider lisinopril the culprit. Continued treatment for dental infection with clindamycin.     COPD (chronic obstructive pulmonary  disease) with chronic respiratory failure  Tobacco dependence  · Continued home medication: ipratropium inhaler, PRN albuterol inhaler. Nicotine patch ordered.      Essential hypertension  Coronary artery disease involving native coronary artery of native heart without angina pectoris  · Continuing home medications: amlodipine, atorvastatin, carvedilol.     Acute myeloid leukemia not having achieved remission, pancytopenia  · Patient plans to start therapy here or at [facility] after dental infection clears.    Review of patient's allergies indicates:   Allergen Reactions    Lisinopril Swelling     Angioedema    Effient [prasugrel] Rash     Recent Labs   Lab  09/30/18   0807  10/03/18   1102   WBC  9.16  5.88   HGB  7.3*  7.2*   HCT  22.6*  21.7*   PLT  34*  18*     Recent Labs   Lab  09/27/18   1237  09/30/18   0807  10/03/18   1102   NA  139  137  136   K  3.9  3.7  4.2   CL  103  101  101   CO2  28  24  24   BUN  11  9  11   CREATININE  0.8  0.9  0.8   GLU  103  122*  157*   CALCIUM  9.3  9.5  9.3   MG  2.1   --   2.1   PHOS  3.8   --    --      Recent Labs   Lab  09/27/18   1237  09/30/18   0807  10/03/18   1102  10/03/18   1139   ALKPHOS  73  80  89   --    ALT  60*  60*  64*   --    AST  45*  36  39   --    ALBUMIN  3.2*  3.1*  2.9*   --    PROT  7.5  7.7  7.7   --    BILITOT  0.5  0.9  0.5   --    INR  1.1   --    --   1.1      Procedures: none    Consultants: none    Medications:  Reconciled Home Medications:      Medication List      START taking these medications    clindamycin 300 MG capsule  Commonly known as:  CLEOCIN  Take 1 capsule (300 mg total) by mouth every 6 (six) hours. for 7 days     diphenhydrAMINE 25 mg capsule  Commonly known as:  BENADRYL  Take 1 each (25 mg total) by mouth every 8 (eight) hours. for 3 days     predniSONE 20 MG tablet  Commonly known as:  DELTASONE  Take 1 tablet (20 mg total) by mouth 2 (two) times daily. for 4 days        CHANGE how you take these medications     nicotine 14 mg/24 hr  Commonly known as:  NICODERM CQ  Place 1 patch onto the skin once daily.  What changed:  Another medication with the same name was removed. Continue taking this medication, and follow the directions you see here.        CONTINUE taking these medications    acyclovir 400 MG tablet  Commonly known as:  ZOVIRAX  Take 1 tablet (400 mg total) by mouth 2 (two) times daily.     albuterol 90 mcg/actuation inhaler  Commonly known as:  PROVENTIL HFA  Inhale 1-2 puffs into the lungs every 4 (four) hours as needed for Wheezing or Shortness of Breath.     allopurinol 300 MG tablet  Commonly known as:  ZYLOPRIM  Take 1 tablet (300 mg total) by mouth once daily. for 14 days     amLODIPine 10 MG tablet  Commonly known as:  NORVASC  Take 1 tablet (10 mg total) by mouth once daily.     atorvastatin 40 MG tablet  Commonly known as:  LIPITOR  Take 1 tablet (40 mg total) by mouth once daily.     carvedilol 12.5 MG tablet  Commonly known as:  COREG  Take 1 tablet (12.5 mg total) by mouth 2 (two) times daily with meals.     ipratropium 17 mcg/actuation inhaler  Commonly known as:  ATROVENT HFA  Inhale 2 puffs into the lungs 2 (two) times daily. Rescue     lamiVUDine 100 MG Tab  Commonly known as:  EPIVIR  Take 1 tablet (100 mg total) by mouth once daily.     oxyCODONE 5 MG immediate release tablet  Commonly known as:  ROXICODONE  Take 1 tablet (5 mg total) by mouth every 4 (four) hours as needed for Pain.        STOP taking these medications    amoxicillin-clavulanate 875-125mg 875-125 mg per tablet  Commonly known as:  AUGMENTIN     lisinopril 20 MG tablet  Commonly known as:  PRINIVIL,ZESTRIL     MUCINEX COLD ORAL     penicillin v potassium 500 MG tablet  Commonly known as:  VEETID        ASK your doctor about these medications    SORAfenib 200 mg tablet  Commonly known as:  NEXAVAR  Take 2 tablets (400 mg total) by mouth 2 (two) times daily.            Discharge Instructions:  Discharge Procedure Orders   Diet  Cardiac     Notify your health care provider if you experience any of the following:  temperature >100.4     Notify your health care provider if you experience any of the following:  persistent nausea and vomiting or diarrhea     Notify your health care provider if you experience any of the following:  persistent dizziness, light-headedness, or visual disturbances     Notify your health care provider if you experience any of the following:  difficulty breathing or increased cough     Activity as tolerated       Discharge Condition: stable    Disposition: Home or Self Care    Indwelling Lines/Drains at time of discharge: none    Tests pending at the time of discharge: none      Time spent on the discharge of the patient including review of hospital course with the patient, reviewing discharge medications and arranging follow-up care: 45 minutes.    Discharge examination Patient was seen and examined on 10/4/2018 and determined to be suitable for discharge.    Discharge plan and follow up:  Follow-up Information     Iram Parekh MD. Schedule an appointment as soon as possible for a visit in 1 week.    Specialties:  Hematology and Oncology, Hematology, Oncology  Why:  For discharge from hospital follow up, As previously planned  Contact information:  [address]  [facility] [address]  [phone]                 Future Appointments   Date Time Provider Department Center   10/8/2018 10:00 AM Conor Rincon MD PhD [facility] CARDIO [place]   10/8/2018  1:50 PM LAB, [facility] CANCER [facility] LAB [place]   10/10/2018 10:00 AM Juju Banda MD [facility]CTR [place]   10/11/2018  8:20 AM LAB, [facility] CANCER [facility] LAB [place]   10/11/2018  9:30 AM Iram Parekh MD [facility] HEM ONC [place]       Provider  Shayy Harley MD  Department of Hospital Medicine  [facility]

## 2018-10-04 NOTE — LETTER
[facility] - Hematology/Oncology  [address] [place] [address]  Phone: [phone]  Fax: [phone]    10/9/2018       [employer] [facility]      Fax: [phone]    Regarding:  Kevin Crews   ID:   [id number]  :  1955  PA#: [id number]    Greetings:    I have recently requested authorization to treat my patient, Kevin Crews, with Nexavar (sorafenib) for the treatment of his untreated acute myeloid leukemia (AML).  Unfortunately, the approval of Nexavar has been denied because plan requires a diagnosis of relapsed or refractory disease and use with other medicines (Vidaza or Dacogen).     Mr. 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 Due to concern of decayed dentition he underwent maxillofacial CT, which showed disease in the right side of his teeth with no abscess. He was evaluated by a dentist who recommended an extensive tooth extraction for advanced periodontal disease and generalized advanced active carious decay of remaining teeth in the maxillary arch. This took place on 2018. Induction therapy was delayed due to extremely bad dentition that would pose marked infection risk. He is not a candidate for aggressive therapy, as 7+3 induction with cytarabine and an anthracycline would come with an unacceptably high risk of mortality in this scenario. I would like to initiate him on therapy with Nexavar and Vidaza (azacitidine).     In a recent phase 2 clinical trial conducted by [facility], azacytidine plus sorafenib demonstrated efficacy and tolerability in elderly patient with untreated FLT3 mutated AML. Newly diagnosed patients were treated with azacitidine 75 mg/m2 daily for 7 days and sorafenib 400 mg twice daily. Eligible patients were positive for FLT3-ITD mutation, age  ?60 years, and had adequate organ function. The overall response rate was 78%. Patients received a median of 3 treatment cycles. Median OS for all patients was 8.3 months and 9.2 months in treatment responders. The regimen was well-tolerated in elderly patients. Ive attached the article to this letter for your reference.    At this time, Mr. Crews and I ask that you reconsider and authorize the use of Nexavar in combination with Vidaza.  Although I appreciate the significant cost of the drug, the treatment is medically necessary to prevent further progression, prolonging his life. Mr. Crews. I eagerly await your response.      Yours truly,        Iram Parekh MD

## 2018-10-04 NOTE — PROGRESS NOTES
Pt discharged and ambulated off unit with spouse. Pt's VSS, NAD noted. IV removed with catheter intact. Discharge instructions reviewed and pt verbalized understanding.

## 2018-10-05 NOTE — TELEPHONE ENCOUNTER
----- Message from Taina Rolon sent at 10/5/2018  3:10 PM CDT -----  Contact: Pt wife   Pt wife calling with questions regarding start date for medication        Ada call back number [phone]      Patient's wife said that she will let us know when she get Nexavar. [facility] already called her to .

## 2018-10-08 NOTE — LETTER
October 8, 2018      Juju Banda MD  [address]  [address]  [facility] [address]           [facility] - Cardiology  [address]   [place] [address]  Phone: [phone]  Fax: [phone]          Patient: Kevin Crews   MR Number: [id number]   YOB: 1955   Date of Visit: 10/8/2018       Dear Dr. Juju Bnada:    Thank you for referring Kevin Crews to me for evaluation. Attached you will find relevant portions of my assessment and plan of care.    If you have questions, please do not hesitate to call me. I look forward to following Kevin Crews along with you.    Sincerely,    Conor Rincon MD PhD    Enclosure  CC:  No Recipients    If you would like to receive this communication electronically, please contact [email or web address] or [phone] to request more information on [employer] Link access.    For providers and/or their staff who would like to refer a patient to [facility], please contact us through our one-stop-shop provider referral line, [facility], at [phone].    If you feel you have received this communication in error or would no longer like to receive these types of communications, please e-mail [email or web address]

## 2018-10-08 NOTE — PATIENT INSTRUCTIONS
Assessment/Plan:  Kevin Crews is a 62 y.o. male with a past medical history of CAD s/p PCI/LINDSAY (2 stents), HTN, CVA 1/2-15, AML (diagnosed 4 weeks ago), prostate cancer s/p surgery in 2007, who presents for an initial appointment.    1. CAD s/p PCI/LINDSAY- No angina currently.  Continue ASA, statin, beta blocker, calcium channel blocker.  Baseline Echo as above.      2. HTN- Blood pressure low today.  Recheck of 110/60 on my exam.  Pt instructed to keep log of blood pressure/heart rate and bring in next visit for review.      3. AML- Likely will undergo chemo.  Baseline echo as above with normal left ventricular systolic function (EF 55-60%).        4. HLD- Continue atorvastatin 40 mg daily.      Obtain outside records  Follow up in 6 months

## 2018-10-08 NOTE — PROGRESS NOTES
[facility]    CC: Establish Care/HTN    Patient ID: Kevin Crews is a 62 y.o. male with a past medical history of CAD s/p PCI/LINDSAY (2 stents), HTN, CVA 1/2-15, AML (diagnosed 4 weeks ago), prostate cancer s/p surgery in 2007, who presents for an initial appointment.  Pertinent history/events are as follows:     -Pt reports having 2 stents placed in [place] at [facility] in 2010 (anatomy unknown).     -Pt kindly referred by Dr. Juju Banda to establish cardiac care.    HPI:  Mr. Crews reports no chest pain, SOB, or LE edema.  States blood pressure this morning 123/74 before taking any antihypertensive medications.  Formerly smoked 1 pack a day for 40 years.  Quit 1 month ago.  EKG from 9/30/2018 shows normal sinus rhythm; LVH with repolarization abnormality.    Past Medical History:   Diagnosis Date    COPD (chronic obstructive pulmonary disease)     Heart attack 2010    Hypertension     Leukemia     AML    Prostate cancer     Stroke      Past Surgical History:   Procedure Laterality Date    CORONARY ANGIOPLASTY WITH STENT PLACEMENT      EYE SURGERY      PROSTATE SURGERY       Social History     Socioeconomic History    Marital status:      Spouse name: Not on file    Number of children: Not on file    Years of education: Not on file    Highest education level: Not on file   Social Needs    Financial resource strain: Not on file    Food insecurity - worry: Not on file    Food insecurity - inability: Not on file    Transportation needs - medical: Not on file    Transportation needs - non-medical: Not on file   Occupational History    Not on file   Tobacco Use    Smoking status: Current Every Day Smoker     Packs/day: 1.00     Years: 40.00     Pack years: 40.00     Types: Cigarettes    Smokeless tobacco: Never Used   Substance and Sexual Activity    Alcohol use: Yes     Comment: RARELY    Drug use: Yes     Types: Cocaine     Comment: HAVEN'T USED IN 2 WEEKS     Sexual activity: Not on file   Other Topics Concern    Not on file   Social History Narrative    Not on file     No family history on file.    Review of patient's allergies indicates:   Allergen Reactions    Lisinopril Swelling     Angioedema    Effient [prasugrel] Rash          Medication List           Accurate as of 10/8/18 10:29 AM. If you have any questions, ask your nurse or doctor.               CONTINUE taking these medications    acyclovir 400 MG tablet  Commonly known as:  ZOVIRAX  Take 1 tablet (400 mg total) by mouth 2 (two) times daily.     albuterol 90 mcg/actuation inhaler  Commonly known as:  PROVENTIL HFA  Inhale 1-2 puffs into the lungs every 4 (four) hours as needed for Wheezing or Shortness of Breath.     amLODIPine 10 MG tablet  Commonly known as:  NORVASC  Take 1 tablet (10 mg total) by mouth once daily.     atorvastatin 40 MG tablet  Commonly known as:  LIPITOR  Take 1 tablet (40 mg total) by mouth once daily.     carvedilol 12.5 MG tablet  Commonly known as:  COREG  Take 1 tablet (12.5 mg total) by mouth 2 (two) times daily with meals.     clindamycin 300 MG capsule  Commonly known as:  CLEOCIN  Take 1 capsule (300 mg total) by mouth every 6 (six) hours. for 7 days     ipratropium 17 mcg/actuation inhaler  Commonly known as:  ATROVENT HFA  Inhale 2 puffs into the lungs 2 (two) times daily. Rescue     lamiVUDine 100 MG Tab  Commonly known as:  EPIVIR  Take 1 tablet (100 mg total) by mouth once daily.     nicotine 14 mg/24 hr  Commonly known as:  NICODERM CQ  Place 1 patch onto the skin once daily.     oxyCODONE 5 MG immediate release tablet  Commonly known as:  ROXICODONE  Take 1 tablet (5 mg total) by mouth every 4 (four) hours as needed for Pain.     predniSONE 20 MG tablet  Commonly known as:  DELTASONE  Take 1 tablet (20 mg total) by mouth 2 (two) times daily. for 4 days     SORAfenib 200 mg tablet  Commonly known as:  NEXAVAR  Take 2 tablets (400 mg total) by mouth 2 (two) times  daily.            Review of Systems  Constitution: Denies chills, fever, and sweats.  HENT: Denies headaches or blurry vision.  Cardiovascular: Denies chest pain or irregular heart beat.  Respiratory: Denies cough or shortness of breath.  Gastrointestinal: Denies abdominal pain, nausea, or vomiting.  Musculoskeletal: Denies muscle cramps.  Neurological: Denies dizziness or focal weakness.  Psychiatric/Behavioral: Normal mental status.  Hematologic/Lymphatic: Denies bleeding problem or easy bruising/bleeding.  Skin: Denies rash or suspicious lesions    Physical Examination  There were no vitals taken for this visit.    Constitutional: No acute distress, conversant  HEENT: Sclera anicteric, Pupils equal, round and reactive to light, extraocular motions intact, Oropharynx clear  Neck: No JVD, no carotid bruits  Cardiovascular: regular rate and rhythm, no murmur, rubs or gallops, normal S1/S2  Pulmonary: Clear to auscultation bilaterally  Abdominal: Abdomen soft, nontender, nondistended, positive bowel sounds  Extremities: No lower extremity edema,   Pulses:  Carotid pulses are 2+ on the right side, and 2+ on the left side.  Radial pulses are 2+ on the right side, and 2+ on the left side.   Femoral pulses are 2+ on the right side, and 2+ on the left side.  Skin: No ecchymosis, erythema, or ulcers  Psych: Alert and oriented x 3, appropriate affect  Neuro: CNII-XII intact, no focal deficits    Labs:  Most Recent Data  CBC:   Lab Results   Component Value Date    WBC 5.88 10/03/2018    HGB 7.2 (L) 10/03/2018    HCT 21.7 (L) 10/03/2018    PLT 18 (LL) 10/03/2018    MCV 99 (H) 10/03/2018    RDW 18.5 (H) 10/03/2018     BMP:   Lab Results   Component Value Date     10/03/2018    K 4.2 10/03/2018     10/03/2018    CO2 24 10/03/2018    BUN 11 10/03/2018    CREATININE 0.8 10/03/2018     (H) 10/03/2018    CALCIUM 9.3 10/03/2018    MG 2.1 10/03/2018    PHOS 3.8 09/27/2018     LFTS;   Lab Results   Component Value  Date    PROT 7.7 10/03/2018    ALBUMIN 2.9 (L) 10/03/2018    BILITOT 0.5 10/03/2018    AST 39 10/03/2018    ALKPHOS 89 10/03/2018    ALT 64 (H) 10/03/2018     COAGS:   Lab Results   Component Value Date    INR 1.1 10/03/2018     FLP:   Lab Results   Component Value Date    CHOL 91 (L) 09/13/2018    HDL 15 (L) 09/13/2018    LDLCALC 50.4 (L) 09/13/2018    TRIG 128 09/13/2018    CHOLHDL 16.5 (L) 09/13/2018     CARDIAC: No results found for: TROPONINI, CKMB, BNP      EKG 9/30/2018:  Normal sinus rhythm  LVH with repolarization abnormality    Echo 9/14/2018:  CONCLUSIONS     1 - Moderate left atrial enlargement.     2 - Eccentric hypertrophy.     3 - Normal left ventricular systolic function (EF 55-60%).     4 - No wall motion abnormalities.     5 - Indeterminate LV diastolic function.     6 - Right ventricle is upper limit of normal in size with normal systolic function.     7 - The estimated PA systolic pressure is 18 mmHg.     Assessment/Plan:  Kevin Crews is a 62 y.o. male with a past medical history of CAD s/p PCI/LINDSAY (2 stents), HTN, CVA 1/2-15, AML (diagnosed 4 weeks ago), prostate cancer s/p surgery in 2007, who presents for an initial appointment.    1. CAD s/p PCI/LINDSAY- No angina currently.  Continue ASA, statin, beta blocker, calcium channel blocker.  Baseline Echo as above.      2. HTN- Blood pressure low today.  Recheck of 110/60 on my exam.  Pt instructed to keep log of blood pressure/heart rate and bring in next visit for review.      3. AML- Likely will undergo chemo.  Baseline echo as above with normal left ventricular systolic function (EF 55-60%).        4. HLD- Continue atorvastatin 40 mg daily.      Obtain outside records  Follow up in 6 months     Total duration of face to face visit time 30 minutes.  Total time spent counseling greater than fifty percent of total visit time.  Counseling included discussion regarding imaging findings, diagnosis, possibilities, treatment options, risks and  benefits.  The patient had many questions regarding the options and long-term effects.    Conor Rincon MD, PhD  Interventional Cardiology

## 2018-10-08 NOTE — PLAN OF CARE
Problem: Patient Care Overview  Goal: Discharge Needs Assessment  Outcome: Ongoing (interventions implemented as appropriate)  Tolerated 1u plts well vitals remained stable PIV d/c'd per pt choice upon completion. Will rtn on 10/9/18 @11am for 1u PRBC prepare released. Pt leaves clinic in w/c accompanied by wife in stable condition NAD noted.

## 2018-10-09 PROBLEM — E87.20 LACTIC ACIDOSIS: Status: ACTIVE | Noted: 2018-01-01

## 2018-10-09 PROBLEM — G93.41 ENCEPHALOPATHY, METABOLIC: Status: ACTIVE | Noted: 2018-01-01

## 2018-10-09 PROBLEM — D62 ACUTE BLOOD LOSS ANEMIA: Status: ACTIVE | Noted: 2018-01-01

## 2018-10-09 PROBLEM — I46.9 CARDIAC ARREST: Status: ACTIVE | Noted: 2018-01-01

## 2018-10-09 PROBLEM — R74.01 TRANSAMINITIS: Status: ACTIVE | Noted: 2018-01-01

## 2018-10-09 NOTE — ASSESSMENT & PLAN NOTE
- noted current smoker, albuterol inhaler and atrovent on MAR, but no PFTs  - CT with convincing bilateral apical blebs, L > R suggestive of underlying pathology consistent with potential COPD   - currently on 2L NC, stated on home O2, need to clarify  - duo-nebs PRN

## 2018-10-09 NOTE — CONSULTS
Please see H&P dated 10/9/18 by Dr. Doherty.    BLANQUITA Short,  [facility]  Critical Care Medicine  Pager [phone]

## 2018-10-09 NOTE — ASSESSMENT & PLAN NOTE
- noted encephalopathy on presentation  - likely septic encephalopathy and hypotension contributing to picture  - CT head unremarkable for acute intracranial abnormality  - overall, noted improvement in mentation after initiation of vasopressors and antibiotics, likely septic in nature  - continue to assess for source of sepsis, vasopressors for MAP > 65, continue broad spectrum antibiotics

## 2018-10-09 NOTE — ED TRIAGE NOTES
Kevin Crews, a 62 y.o. male presents to the ED arrived via  EMS for hypotension and AMS.  Pt reports taking his percocet and received narcan per EMS with positive response    Triage note:  Chief Complaint   Patient presents with    Hypotension    Altered Mental Status     Review of patient's allergies indicates:   Allergen Reactions    Lisinopril Swelling     Angioedema    Effient [prasugrel] Rash     Past Medical History:   Diagnosis Date    COPD (chronic obstructive pulmonary disease)     Heart attack 2010    Hypertension     Leukemia     AML    Prostate cancer     Stroke

## 2018-10-09 NOTE — ASSESSMENT & PLAN NOTE
- Cr of 2.6 on admission with baseline 1.2, improved with IVF and vasopressors to 2.3  - possibly pre-renal from shock with hypoperfusion versus intrinsic ATN from hypoperfusion  - will need to further monitor kidney function, continue Is and Os  - avoid nephrotoxic medications  - renally dose medications as needed

## 2018-10-09 NOTE — ASSESSMENT & PLAN NOTE
- CAD s/p PCI x 2 in 2008  - no anginal symptoms previously, troponin negative on first draw, suspect hypotension unrelated   - however, 2D echo with new inferolateral wall hypokinesis as well as global hypokinesis, possibly related to sepsis  - will continue to assess, hold home beta blocker at this time

## 2018-10-09 NOTE — ASSESSMENT & PLAN NOTE
- recently started on hepatitis B treatment  - continue to hold for now pending improvement of mental status

## 2018-10-09 NOTE — ASSESSMENT & PLAN NOTE
- persistently elevated transaminases in the 50s/60s  - likely related to underlying chronic hepatitis B, otherwise no worsening given episode of hypotension  - continue to trend at this time, can treat hepatitis B at later time once clinically improved

## 2018-10-09 NOTE — ASSESSMENT & PLAN NOTE
- recent diagnosis of non M3 AML, FLT3 positive with normal cytogenetics  - supposed to initiate therapy at this time, pending approval  - BMT consulted, appreciate assistance

## 2018-10-09 NOTE — PROCEDURES
"Kevin Crews is a 62 y.o. male patient.    Temp: 97.4 °F (36.3 °C) (10/09/18 1002)  Pulse: 98 (10/09/18 1030)  Resp: (!) 26 (10/09/18 1030)  BP: (!) 101/54 (10/09/18 1030)  SpO2: 95 % (10/09/18 1030)  Weight: 82.1 kg (181 lb) (10/09/18 0710)  Height: 5' 11" (180.3 cm) (10/09/18 0710)       Central Line  Date/Time: 10/9/2018 8:45 AM  Performed by: Tyler Doherty MD  Consent Done: Yes  Time out: Immediately prior to procedure a "time out" was called to verify the correct patient, procedure, equipment, support staff and site/side marked as required.  Indications: med administration and vascular access  Anesthesia: local infiltration    Anesthesia:  Local Anesthetic: lidocaine 1% without epinephrine  Anesthetic total: 5 mL  Preparation: skin prepped with ChloraPrep  Skin prep agent dried: skin prep agent completely dried prior to procedure  Sterile barriers: all five maximum sterile barriers used - cap, mask, sterile gown, sterile gloves, and large sterile sheet  Hand hygiene: hand hygiene performed prior to central venous catheter insertion  Location details: right internal jugular  Catheter type: trialysis  Catheter size: 7 Fr  Catheter Length: 15cm    Ultrasound guidance: yes  Vessel Caliber: large, patent, compressibility normal  Needle advanced into vessel with real time Ultrasound guidance.  Guidewire confirmed in vessel.  Sterile sheath used.  Manometry: Yes  Number of attempts: 1  Assessment: placement verified by x-ray  Complications: none  Estimated blood loss (mL): 4  Specimens: No  Implants: No  Post-procedure: line sutured,  chlorhexidine patch,  sterile dressing applied and blood return through all ports  Complications: No  Comments: Patient tolerated procedure without immediate complications.  CXR confirmed line placement and no pneumothorax.            Tyler Doherty  10/9/2018  "

## 2018-10-09 NOTE — ASSESSMENT & PLAN NOTE
- known baseline L deficits, otherwise patient alert and following commands  - hold statin for now until patient with improvement in mental status

## 2018-10-09 NOTE — NURSING
Notified Dr. Doherty pt withdraw LLE to pain. No withdrawal from pain on other extremities. R pupil blown, fixed. L pupil 2, reactive. Pt too unstable to transport to CT at this time WCTM and update team

## 2018-10-09 NOTE — ED PROVIDER NOTES
Encounter Date: 10/9/2018    SCRIBE #1 NOTE: I, Facundo Main, am scribing for, and in the presence of,  Dr. Fitzpatrick. I have scribed the following portions of the note -       History     Chief Complaint   Patient presents with    Hypotension    Altered Mental Status     Time patient was seen by the provider: 7:15 AM      The patient is a 62 y.o. male with co-morbidities including: HTN, MI, stroke, COPD, leukemia who presents to the ED with a complaint of AMS today. Pt's wife reports that he has been ill appearing since yesterday w/ associated generalized weakness and fatigue. Last night he started running a fever of 101. This morning his breathing became labored and he became more confused. She also reports that his BP has been running low for the past day. She reports that he was supposed to get both blood and platelet transfusion yesterday but she reports that he was able to get the platelets but not the blood because there was no type and screen obtained. Of note, pt's cbc yesterday revealed a wbc of 6, H&H of 6.5/19.5, and platelet count of 9.               Review of patient's allergies indicates:   Allergen Reactions    Lisinopril Swelling     Angioedema    Effient [prasugrel] Rash     Past Medical History:   Diagnosis Date    COPD (chronic obstructive pulmonary disease)     Heart attack 2010    Hypertension     Leukemia     AML    Prostate cancer     Stroke      Past Surgical History:   Procedure Laterality Date    CORONARY ANGIOPLASTY WITH STENT PLACEMENT      EYE SURGERY      PROSTATE SURGERY       History reviewed. No pertinent family history.  Social History     Tobacco Use    Smoking status: Current Every Day Smoker     Packs/day: 1.00     Years: 40.00     Pack years: 40.00     Types: Cigarettes    Smokeless tobacco: Never Used   Substance Use Topics    Alcohol use: Yes     Comment: RARELY    Drug use: Yes     Types: Cocaine     Comment: HAVEN'T USED IN 2 WEEKS     Review of Systems    Constitutional: Positive for fatigue and fever (101).   HENT: Negative for sore throat.    Eyes: Negative for visual disturbance.   Respiratory: Positive for shortness of breath.    Cardiovascular: Negative for chest pain.        Hypotension     Gastrointestinal: Negative for nausea and vomiting.   Genitourinary: Negative for dysuria.   Musculoskeletal: Negative for back pain.   Skin: Negative for rash.   Neurological: Positive for weakness. Negative for headaches.       Physical Exam     Initial Vitals   BP Pulse Resp Temp SpO2   10/09/18 0710 10/09/18 0710 10/09/18 0710 10/09/18 0710 10/09/18 0712   103/79 (!) 126 (!) 26 98.5 °F (36.9 °C) 100 %      MAP       --                Physical Exam    Nursing note and vitals reviewed.  Constitutional: He appears well-developed and well-nourished. He appears lethargic. He is not diaphoretic. He appears ill. He appears distressed.   HENT:   Head: Normocephalic and atraumatic.   Mouth/Throat: Oropharynx is clear and moist.   Eyes: EOM are normal.   R pupil is fixed and dilated with R ptosis which is old. L pupil is 4 mm and reactive   Neck: Normal range of motion. Neck supple.   Cardiovascular: Normal rate and regular rhythm.   Pulmonary/Chest: He is in respiratory distress.   Diminished breath sounds bilaterally, accessory muscle use   Abdominal: Soft. He exhibits no distension. There is no tenderness. There is no guarding.   Musculoskeletal: Normal range of motion. He exhibits no edema.   Neurological: He appears lethargic.   Pt able to follow commands, confused, not oriented to place or time   Skin: Skin is warm and dry.   Petechiae noted to R axilla         ED Course   Procedures  Labs Reviewed   CBC W/ AUTO DIFFERENTIAL - Abnormal; Notable for the following components:       Result Value    WBC 22.10 (*)     RBC 1.79 (*)     Hemoglobin 5.8 (*)     Hematocrit 17.6 (*)     MCH 32.4 (*)     RDW 18.6 (*)     Platelets 37 (*)     nRBC 1 (*)     Gran% 0.0 (*)     Lymph%  17.0 (*)     Blasts 76.0 (*)     Platelet Estimate Decreased (*)     All other components within normal limits    Narrative:       plt critical result(s) called and verbal readback obtained from jagdish yo rn, 10/09/2018 09:01     H&H critical result(s) called and verbal readback obtained from   JAGDISH YO RN, 10/09/2018 08:03   COMPREHENSIVE METABOLIC PANEL - Abnormal; Notable for the following components:    Sodium 130 (*)     Chloride 93 (*)     CO2 19 (*)     BUN, Bld 45 (*)     Creatinine 2.6 (*)     Calcium 8.0 (*)     Albumin 2.6 (*)     AST 55 (*)     ALT 70 (*)     Anion Gap 18 (*)     eGFR if  29.7 (*)     eGFR if non  25.7 (*)     All other components within normal limits   LACTIC ACID, PLASMA - Abnormal; Notable for the following components:    Lactate (Lactic Acid) 7.0 (*)     All other components within normal limits   URINALYSIS, REFLEX TO URINE CULTURE - Abnormal; Notable for the following components:    Appearance, UA Cloudy (*)     Protein, UA 3+ (*)     All other components within normal limits    Narrative:     Preferred Collection Type->Urine, Clean Catch   PROTIME-INR - Abnormal; Notable for the following components:    Prothrombin Time 12.7 (*)     INR 1.3 (*)     All other components within normal limits   PROCALCITONIN - Abnormal; Notable for the following components:    Procalcitonin 21.71 (*)     All other components within normal limits   FIBRINOGEN - Abnormal; Notable for the following components:    Fibrinogen 377 (*)     All other components within normal limits   HAPTOGLOBIN - Abnormal; Notable for the following components:    Haptoglobin 306 (*)     All other components within normal limits   LACTATE DEHYDROGENASE - Abnormal; Notable for the following components:     (*)     All other components within normal limits   URINALYSIS MICROSCOPIC - Abnormal; Notable for the following components:    Bacteria, UA Many (*)     Hyaline Casts, UA 2 (*)      All other components within normal limits    Narrative:     Preferred Collection Type->Urine, Clean Catch   LACTIC ACID, PLASMA - Abnormal; Notable for the following components:    Lactate (Lactic Acid) 5.3 (*)     All other components within normal limits   COMPREHENSIVE METABOLIC PANEL - Abnormal; Notable for the following components:    Sodium 131 (*)     CO2 17 (*)     Glucose 145 (*)     BUN, Bld 45 (*)     Creatinine 2.3 (*)     Calcium 6.9 (*)     Total Protein 5.4 (*)     Albumin 2.1 (*)     Total Bilirubin 1.2 (*)     AST 51 (*)     ALT 67 (*)     eGFR if  34.4 (*)     eGFR if non  29.8 (*)     All other components within normal limits    Narrative:     ADD-ON ORDERS ONLY:->Redraw test if specimen not available   CBC W/ AUTO DIFFERENTIAL - Abnormal; Notable for the following components:    WBC 16.06 (*)     RBC 2.07 (*)     Hemoglobin 6.5 (*)     Hematocrit 19.6 (*)     MCH 31.4 (*)     RDW 18.3 (*)     Platelets 27 (*)     nRBC 1 (*)     Gran% 0.0 (*)     Lymph% 7.0 (*)     Blasts 85.0 (*)     Platelet Estimate Decreased (*)     All other components within normal limits    Narrative:     ADD-ON ORDERS ONLY:->Redraw test if specimen not available  PLT critical result(s) called and verbal readback obtained from Jagdish Valenzuela RN, 10/09/2018 14:33    H&H critical result(s) called and verbal readback obtained from JAGDISH VALENZUELA RN, 10/09/2018 13:07   UREA NITROGEN, URINE, RANDOM - Abnormal; Notable for the following components:    Urine Urea Nitrogen, Random 116 (*)     All other components within normal limits    Narrative:     Preferred Collection Type->Urine, Clean Catch  add on [id number]-urea nitrogen; [id number]-sodium urine per Dr. Lara  10/09/2018  10:29    ISTAT PROCEDURE - Abnormal; Notable for the following components:    POC PO2 34 (*)     POC HCO3 23.8 (*)     POC SATURATED O2 69 (*)     All other components within normal limits   ISTAT PROCEDURE - Abnormal;  Notable for the following components:    POC BUN 39 (*)     POC Creatinine 2.7 (*)     POC Sodium 130 (*)     POC Chloride 91 (*)     POC Ionized Calcium 0.93 (*)     POC Hematocrit 19 (*)     All other components within normal limits   ISTAT PROCEDURE - Abnormal; Notable for the following components:    POC PCO2 24.4 (*)     POC PO2 62 (*)     POC HCO3 16.5 (*)     POC SATURATED O2 93 (*)     POC TCO2 17 (*)     All other components within normal limits   POCT GLUCOSE - Abnormal; Notable for the following components:    POCT Glucose 155 (*)     All other components within normal limits   CULTURE, URINE   MAGNESIUM   PHOSPHORUS   TROPONIN I   DRUG SCREEN PANEL, URINE EMERGENCY    Narrative:     Preferred Collection Type->Urine, Clean Catch   PHOSPHORUS   SODIUM, URINE, RANDOM   UREA NITROGEN, URINE, RANDOM   MAGNESIUM    Narrative:     ADD-ON ORDERS ONLY:->Redraw test if specimen not available   TROPONIN I    Narrative:     ADD-ON ORDERS ONLY:->Redraw test if specimen not available   CREATININE, URINE, RANDOM   SODIUM, URINE, RANDOM    Narrative:     Preferred Collection Type->Urine, Clean Catch  add on [id number]-urea nitrogen; [id number]-sodium urine per Dr. Lara  10/09/2018  10:29    RETICULOCYTES   RETICULOCYTES    Narrative:     add on [id number] per Dr. Doherty  10/09/2018  11:53    TYPE & SCREEN   POCT GLUCOSE MONITORING CONTINUOUS   ISTAT CHEM8   POCT GLUCOSE MONITORING CONTINUOUS   PREPARE RBC SOFT   PREPARE RBC SOFT   PREPARE PLATELETS (DOSE) SOFT     EKG Readings: (Independently Interpreted)   Rhythm: Normal Sinus Rhythm.   t wave inversions in inferior and lateral leads that are mostly old.       Imaging Results          CT Abdomen Pelvis  Without Contrast (Final result)     Abnormal  Result time 10/09/18 12:36:35   Procedure changed from CT Chest Abdoment Pelvis Without Contrast (XPD)     Final result by Valentin Catherine Jr., MD (10/09/18 12:36:35)                 Impression:      Multifocal  subsegmental opacities throughout the lungs as well as focal consolidation in the left lower lobe concerning for infectious process.  Recommend further evaluation and follow-up to document resolution.    Significant periodontal disease including marked erosive change of the oral maxilla with minimal residual bone  the oral cavity from the maxillary sinuses.  No focal abscess or inflammation noting limited evaluation secondary to lack of contrast.  Significant sinus disease as above    Bilateral cervical fullness concerning for lymphadenopathy.    Soft tissue haziness around the distal aorta/proximal iliac arteries which may relate to motion, however inflammatory change cannot be completely excluded.    Additional findings as above.    This report was flagged in Epic as abnormal.    Electronically signed by resident: Yogesh Salmeron  Date:    10/09/2018  Time:    11:55    Electronically signed by: Valentin Catherine MD  Date:    10/09/2018  Time:    12:36             Narrative:    EXAMINATION:  CT NECK CHEST WITHOUT CONTRAST (XPD); CT ABDOMEN PELVIS WITHOUT CONTRAST    CLINICAL HISTORY:  eval for abcess h/o dental abscess; septic shock;; Sepsis;unclear source;    TECHNIQUE:  Low-dose axial images obtained through the neck, chest, abdomen, and pelvis without the use of intravenous or oral contrast.  Abdominal examination mildly degraded by patient motion artifact.    COMPARISON:  Doppler liver ultrasound 09/19/2018; CTA chest 09/14/2018    FINDINGS:  NECK    Orbits, paranasal sinuses, and skull base: There is significant opacification of the maxillary sinuses, as well as mucosal thickening in the sphenoid sinuses and patchy opacification of the ethmoidal air cells.  There is postoperative change at the right maxilla.  Visualized portions of the orbits and skull base appear within normal limits.    Nasopharynx: Within normal limits.    Suprahyoid neck: There are multiple missing teeth with significant erosive  change throughout the associated maxilla and minimal residual bone between  the oral cavity from the maxillary sinuses.  No convincing evidence of abscess or significant surrounding soft tissue inflammation on this noncontrast study.    Infrahyoid neck: Normal larynx, hypopharynx, and supraglottis.    Thyroid: Asymmetric appearance with the right lobe slightly larger when compared to the left.    Lymph nodes: There is symmetric fullness in the region of the bilateral level 2A cervical chain lymph nodes concerning for significant adenopathy.    Other findings: None.    CHEST    Heart: Measures at the upper limits of normal.  No significant pericardial fluid.  There is coronary artery atherosclerotic calcification.    Mediastinum/Axillary/Hilum: Multiple normal sized axillary lymph nodes.  Mediastinum/hilum demonstrate no abnormal masses or lymphadenopathy.    Lungs: There are multifocal subsegmental opacities throughout the lungs as well as consolidation in the superior left lower lobe.  There is paraseptal emphysema as well as bullous disease in the bilateral apices.    Chest Wall: Small low-density density subcutaneous nodule possibly representing a sebaceous cyst.    ABDOMEN/PELVIS    Stomach: Normal.    Liver: Normal in size.  Homogeneous in attenuation.  No focal lesion.    Gallbladder: Nondistended.  No calcified gallstones.    Bile Ducts: No evidence of dilated ducts.    Pancreas: No mass or peripancreatic fat stranding.    Spleen: Normal in size without focal lesion.    Adrenals: Normal.    Bowel/Mesentery: No evidence of bowel obstruction or inflammation.  Moderate amount of stool scattered throughout the colon.  The appendix is within normal limits.  No significant mesenteric edema or adenopathy.  No free air or ascites.    Urinary Tract: Kidneys demonstrate normal morphology.  No nephrolithiasis or evidence of obstructive uropathy. There is air in the bladder which is decompressed around a Saez  catheter.  Air is likely secondary to Saez catheterization.    Retroperitoneum:  No significant adenopathy.    Pelvis:  No pelvic masses, adenopathy, or free fluid.    Abdominal wall:  Normal.    COMPREHENSIVE    Vasculature: The aorta demonstrates significant atherosclerotic calcification predominantly involving the infrarenal abdominal aorta and extending through the iliac arteries.  There is ectasias of the infrarenal abdominal aorta measuring up to 2.8 cm.  There is small amount of soft tissue haziness around the distal aorta/proximal iliac arteries which may relate to motion artifact, however inflammatory change/vasculitis cannot be completely excluded.  Right-sided central venous catheter with tip terminating in the distal SVC.    Bones: No acute fracture or bony destructive process. There is mild degenerative joint disease.                               CT Neck Chest Without Contrast (XPD) (Final result)     Abnormal  Result time 10/09/18 12:36:35    Final result by Valentin Catherine Jr., MD (10/09/18 12:36:35)                 Impression:      Multifocal subsegmental opacities throughout the lungs as well as focal consolidation in the left lower lobe concerning for infectious process.  Recommend further evaluation and follow-up to document resolution.    Significant periodontal disease including marked erosive change of the oral maxilla with minimal residual bone  the oral cavity from the maxillary sinuses.  No focal abscess or inflammation noting limited evaluation secondary to lack of contrast.  Significant sinus disease as above    Bilateral cervical fullness concerning for lymphadenopathy.    Soft tissue haziness around the distal aorta/proximal iliac arteries which may relate to motion, however inflammatory change cannot be completely excluded.    Additional findings as above.    This report was flagged in Epic as abnormal.    Electronically signed by resident: Yogesh  Jaron  Date:    10/09/2018  Time:    11:55    Electronically signed by: Valentin Catherine MD  Date:    10/09/2018  Time:    12:36             Narrative:    EXAMINATION:  CT NECK CHEST WITHOUT CONTRAST (XPD); CT ABDOMEN PELVIS WITHOUT CONTRAST    CLINICAL HISTORY:  eval for abcess h/o dental abscess; septic shock;; Sepsis;unclear source;    TECHNIQUE:  Low-dose axial images obtained through the neck, chest, abdomen, and pelvis without the use of intravenous or oral contrast.  Abdominal examination mildly degraded by patient motion artifact.    COMPARISON:  Doppler liver ultrasound 09/19/2018; CTA chest 09/14/2018    FINDINGS:  NECK    Orbits, paranasal sinuses, and skull base: There is significant opacification of the maxillary sinuses, as well as mucosal thickening in the sphenoid sinuses and patchy opacification of the ethmoidal air cells.  There is postoperative change at the right maxilla.  Visualized portions of the orbits and skull base appear within normal limits.    Nasopharynx: Within normal limits.    Suprahyoid neck: There are multiple missing teeth with significant erosive change throughout the associated maxilla and minimal residual bone between  the oral cavity from the maxillary sinuses.  No convincing evidence of abscess or significant surrounding soft tissue inflammation on this noncontrast study.    Infrahyoid neck: Normal larynx, hypopharynx, and supraglottis.    Thyroid: Asymmetric appearance with the right lobe slightly larger when compared to the left.    Lymph nodes: There is symmetric fullness in the region of the bilateral level 2A cervical chain lymph nodes concerning for significant adenopathy.    Other findings: None.    CHEST    Heart: Measures at the upper limits of normal.  No significant pericardial fluid.  There is coronary artery atherosclerotic calcification.    Mediastinum/Axillary/Hilum: Multiple normal sized axillary lymph nodes.  Mediastinum/hilum demonstrate no abnormal  masses or lymphadenopathy.    Lungs: There are multifocal subsegmental opacities throughout the lungs as well as consolidation in the superior left lower lobe.  There is paraseptal emphysema as well as bullous disease in the bilateral apices.    Chest Wall: Small low-density density subcutaneous nodule possibly representing a sebaceous cyst.    ABDOMEN/PELVIS    Stomach: Normal.    Liver: Normal in size.  Homogeneous in attenuation.  No focal lesion.    Gallbladder: Nondistended.  No calcified gallstones.    Bile Ducts: No evidence of dilated ducts.    Pancreas: No mass or peripancreatic fat stranding.    Spleen: Normal in size without focal lesion.    Adrenals: Normal.    Bowel/Mesentery: No evidence of bowel obstruction or inflammation.  Moderate amount of stool scattered throughout the colon.  The appendix is within normal limits.  No significant mesenteric edema or adenopathy.  No free air or ascites.    Urinary Tract: Kidneys demonstrate normal morphology.  No nephrolithiasis or evidence of obstructive uropathy. There is air in the bladder which is decompressed around a Saez catheter.  Air is likely secondary to Saez catheterization.    Retroperitoneum:  No significant adenopathy.    Pelvis:  No pelvic masses, adenopathy, or free fluid.    Abdominal wall:  Normal.    COMPREHENSIVE    Vasculature: The aorta demonstrates significant atherosclerotic calcification predominantly involving the infrarenal abdominal aorta and extending through the iliac arteries.  There is ectasias of the infrarenal abdominal aorta measuring up to 2.8 cm.  There is small amount of soft tissue haziness around the distal aorta/proximal iliac arteries which may relate to motion artifact, however inflammatory change/vasculitis cannot be completely excluded.  Right-sided central venous catheter with tip terminating in the distal SVC.    Bones: No acute fracture or bony destructive process. There is mild degenerative joint disease.                                CT Head Without Contrast (Final result)  Result time 10/09/18 11:12:02    Final result by Murtaza Ritchie DO (10/09/18 11:12:02)                 Impression:      Significant motion limited examination as detailed above without definite acute intracranial hemorrhage or hydrocephalus.  Clinical correlation and further evaluation with repeat attempts for imaging when patient better able to tolerate scanning as warranted.    Probable remote fracture deformities lamina papyracea with moderate patchy opacities in the visualized paranasal sinuses      Electronically signed by: Murtaza Ritchie DO  Date:    10/09/2018  Time:    11:12             Narrative:    EXAMINATION:  CT HEAD WITHOUT CONTRAST    CLINICAL HISTORY:  Confusion/delirium, altered LOC, unexplained;    TECHNIQUE:  Multiple sequential 5 mm axial images of the head without contrast.  Coronal and sagittal reformatted imaging from the axial acquisition.    COMPARISON:  None    FINDINGS:  Study is limited by severe patient motion artifact.  There is no definite acute intracranial hemorrhage within limits of the study.  Ventricles normal without hydrocephalus.  There is ill-defined decreased attenuation within the supratentorial white matter most pronounced in the parietal lobes which may be underlying chronic ischemic change.  There is no midline shift or significant mass effect.    Remote fracture deformities of bilateral lamina papyracea.    There is moderate opacities in the maxillary antra bilaterally with remote hardware within the right anterior maxillary wall.  Please note evaluation for subtle subarachnoid hemorrhage is limited by motion.  No definite sulcal effacement.  Gray-white matter distinction somewhat limited by motion..                               X-Ray Chest AP Portable (Final result)  Result time 10/09/18 09:54:45    Final result by Shashank Vasquez MD (10/09/18 09:54:45)                 Impression:      See  above      Electronically signed by: Shashank Vasquez MD  Date:    10/09/2018  Time:    09:54             Narrative:    EXAMINATION:  XR CHEST AP PORTABLE    CLINICAL HISTORY:  Encounter for adjustment and management of vascular access device    TECHNIQUE:  Single frontal view of the chest was performed.    COMPARISON:  10/09/2018 07:30 3 hr    FINDINGS:  Right jugular line is been inserted with its tip in superior vena cava.  No pneumothorax is noted.  No other significant change.                               X-Ray Chest AP Portable (Final result)  Result time 10/09/18 08:08:20    Final result by Shashank Vasquez MD (10/09/18 08:08:20)                 Impression:      See above      Electronically signed by: Shashank Vasquez MD  Date:    10/09/2018  Time:    08:08             Narrative:    EXAMINATION:  XR CHEST AP PORTABLE    CLINICAL HISTORY:  Sepsis;    TECHNIQUE:  Single frontal view of the chest was performed.    COMPARISON:  09/30/2018    FINDINGS:  Cardiac size is enlarged.  Larger than was on previous films lungs are clear.                                 Medical Decision Making:   History:   Old Medical Records: I decided to obtain old medical records.  Initial Assessment:   61 yo m, untreated AML 2/2 dental issues/comorbidities/social factors, now here hypotensive with AMS    Suspect septic shock as fever last night  No clear localizing infectious sx - recent dental work (and on augmentin) though no obvious dental infection on exam    Large IVs placed on arrival  Aggressive IVF resuscitation  Vanc/cefepime (am avoiding PCN for now given angioedema last week, ? 2/2 ACE vs augmentin)  2 U PRBC ordered  ICU consult            Clinical Tests:   Lab Tests: Ordered and Reviewed  Radiological Study: Ordered and Reviewed  Medical Tests: Ordered and Reviewed  ED Management:  8:57 AM  ICU team at bedside  S/P 2 L NS, pt persistently hypotensive  Peripheral levophed ordered, ICU team placing central line  2 U  PRBC being infused  Still awake, following commands, answering questions appropriately    9:58 AM  6 Hours Sepsis Perfusion Exam   Provider Attestation  I attest, a sepsis perfusion exam was performed within 6 hours of Septic Shock presentation, following fluid resuscitation.              Scribe Attestation:   Scribe #1: I performed the above scribed service and the documentation accurately describes the services I performed. I attest to the accuracy of the note.    Attending Attestation:         Attending Critical Care:   Critical Care Times:   ==============================================================  · Total Critical Care Time - exclusive of procedural time: 40 minutes.  ==============================================================  Critical care was necessary to treat or prevent imminent or life-threatening deterioration of the following conditions: sepsis and hypotension.   Critical care was time spent personally by me on the following activities: obtaining history from patient or relative, examination of patient, review of x-rays / CT sent with the patient, review of old charts, ordering lab, x-rays, and/or EKG, development of treatment plan with patient or relative, ordering and performing treatments and interventions, evaluation of patient's response to treatment, discussion with consultants and re-evaluation of patient's conition.       I, Dr. Iram Fitzpatrick, personally performed the services described in this documentation. All medical record entries made by the scribe were at my direction and in my presence.  I have reviewed the chart and agree that the record reflects my personal performance and is accurate and complete. Iram Fitzpatrick MD.  8:41 AM 10/10/2018               Clinical Impression:   The primary encounter diagnosis was Septic shock. Diagnoses of Hypotension, Acute blood loss anemia, Encounter for central line placement, Shock, Acute metabolic encephalopathy, Acute myeloid leukemia not  having achieved remission, RUDI (acute kidney injury), Cardiac arrest, and Hyperkalemia were also pertinent to this visit.                             Iram Fitzpatrick MD  10/10/18 [id number]

## 2018-10-09 NOTE — PROCEDURES
"Kevin Crews is a 62 y.o. male patient.    Temp: 97.4 °F (36.3 °C) (10/09/18 1002)  Pulse: 99 (10/09/18 1013)  Resp: (!) 24 (10/09/18 1013)  BP: (!) 97/51 (10/09/18 1010)  SpO2: 96 % (10/09/18 1013)  Weight: 82.1 kg (181 lb) (10/09/18 0710)  Height: 5' 11" (180.3 cm) (10/09/18 0710)       Arterial Line  Date/Time: 10/9/2018 10:19 AM  Location procedure was performed: [facility] EMERGENCY DEPARTMENT  Performed by: Cami Wesley NP  Authorized by: Cami Wesley NP   Assisting provider: Gracy Palomares NP  Pre-op Diagnosis: shock  Post-operative diagnosis: shock  Consent Done: Yes  Consent: Written consent obtained.  Risks and benefits: risks, benefits and alternatives were discussed  Consent given by: spouse  Patient understanding: patient states understanding of the procedure being performed  Patient consent: the patient's understanding of the procedure matches consent given  Procedure consent: procedure consent matches procedure scheduled  Relevant documents: relevant documents present and verified  Patient identity confirmed: name, MRN and   Time out: Immediately prior to procedure a "time out" was called to verify the correct patient, procedure, equipment, support staff and site/side marked as required.  Preparation: Patient was prepped and draped in the usual sterile fashion.  Indications: hemodynamic monitoring  Location: left radial    Anesthesia:  Local Anesthetic: lidocaine 1% without epinephrine  Anesthetic total: 1 mL  Patient sedated: no  Needle gauge: 20  Seldinger technique: Seldinger technique used  Number of attempts: 1  Complications: No  Estimated blood loss (mL): 1  Post-procedure: line sutured and dressing applied  Post-procedure CMS: normal  Patient tolerance: Patient tolerated the procedure well with no immediate complications          Cami Wesley  10/9/2018  "

## 2018-10-09 NOTE — TELEPHONE ENCOUNTER
Urgent appeal faxed to:   [employer] - Grievances & Appeals Department      Fax: [phone]

## 2018-10-09 NOTE — ASSESSMENT & PLAN NOTE
- patient with noted poor dentition with numerous dental caries, overall possible source for sepsis  - CT head/neck with missing teeth and minimal bone  oral cavity from maxillary sinuses, but no convincing evidence of an abscess or significant tissue inflammation  - overall, continue broad spectrum antibiotics at this time given septic shock

## 2018-10-09 NOTE — ED NOTES
ICU MD Scott at bedside for central line insertion, pt positioned correctly for procedure, US machine at bedside.

## 2018-10-09 NOTE — ASSESSMENT & PLAN NOTE
- noted anemia of 5.8 on admission, 6 day prior  - likely due to hypoproliferation from underlying AML  - reticulocyte count ordered for assessment  - will need definitive treatment of AML for further improvement  - Transfuse for hemoglobin < 7

## 2018-10-09 NOTE — ED NOTES
Arterial line and central line consents signed by patient's wife, MD, and witness - left at bedside

## 2018-10-09 NOTE — SUBJECTIVE & OBJECTIVE
Oncology Treatment Plan:   OP AZACITADINE 7-DAY (SUB-Q)    Medications:  Continuous Infusions:   norepinephrine bitartrate-D5W 0.6 mcg/kg/min (10/09/18 1347)     Scheduled Meds:   ceFEPime (MAXIPIME) IVPB  2 g Intravenous Q12H    hydrocortisone sodium succinate  100 mg Intravenous Q8H    metronidazole  500 mg Intravenous Q8H    sodium chloride 0.9%  3 mL Intravenous Q8H    [START ON 10/10/2018] vancomycin (VANCOCIN) IVPB  1,000 mg Intravenous Q24H     PRN Meds:sodium chloride, sodium chloride, sodium chloride, sodium chloride, dextrose 50%, glucagon (human recombinant)     Review of patient's allergies indicates:   Allergen Reactions    Lisinopril Swelling     Angioedema    Effient [prasugrel] Rash        Past Medical History:   Diagnosis Date    COPD (chronic obstructive pulmonary disease)     Heart attack 2010    Hypertension     Leukemia     AML    Prostate cancer     Stroke      Past Surgical History:   Procedure Laterality Date    CORONARY ANGIOPLASTY WITH STENT PLACEMENT      EYE SURGERY      PROSTATE SURGERY       Family History     None        Tobacco Use    Smoking status: Current Every Day Smoker     Packs/day: 1.00     Years: 40.00     Pack years: 40.00     Types: Cigarettes    Smokeless tobacco: Never Used   Substance and Sexual Activity    Alcohol use: Yes     Comment: RARELY    Drug use: Yes     Types: Cocaine     Comment: HAVEN'T USED IN 2 WEEKS    Sexual activity: Not on file       Review of Systems   Constitutional: Positive for chills, fatigue and fever.   HENT: Positive for nosebleeds.    Respiratory: Positive for shortness of breath.    Cardiovascular: Positive for chest pain. Negative for leg swelling.   Gastrointestinal: Negative for abdominal distention, abdominal pain, constipation, diarrhea, nausea and vomiting.   Genitourinary: Negative for dysuria.   Neurological: Positive for weakness. Negative for dizziness and light-headedness.   Hematological: Bruises/bleeds  easily.   Psychiatric/Behavioral: Negative for confusion.     Objective:     Vital Signs (Most Recent):  Temp: 97.6 °F (36.4 °C) (10/09/18 1330)  Pulse: 101 (10/09/18 1330)  Resp: (!) 24 (10/09/18 1330)  BP: (!) 91/56 (10/09/18 1330)  SpO2: 95 % (10/09/18 1330) Vital Signs (24h Range):  Temp:  [97.4 °F (36.3 °C)-98.6 °F (37 °C)] 97.6 °F (36.4 °C)  Pulse:  [] 101  Resp:  [16-66] 24  SpO2:  [83 %-100 %] 95 %  BP: ()/(43-79) 91/56  Arterial Line BP: ()/(40-65) 82/56     Weight: 82.1 kg (181 lb)  Body mass index is 25.24 kg/m².  Body surface area is 2.03 meters squared.      Intake/Output Summary (Last 24 hours) at 10/9/2018 1404  Last data filed at 10/9/2018 1300  Gross per 24 hour   Intake 4751 ml   Output 365 ml   Net 4386 ml       Physical Exam   Constitutional: He appears well-developed. He appears distressed.   HENT:   Head: Normocephalic.   Eyes: Pupils are equal, round, and reactive to light. No scleral icterus.   Neck: Normal range of motion.   Cardiovascular: Regular rhythm and normal heart sounds. Exam reveals no gallop and no friction rub.   No murmur heard.  tachycardic   Pulmonary/Chest: Effort normal and breath sounds normal. No respiratory distress. He has no wheezes. He has no rales.   Abdominal: Soft. Bowel sounds are normal. He exhibits no distension and no mass. There is no tenderness. There is no guarding.   Musculoskeletal: He exhibits no edema.   Neurological: He is alert.   Skin: Skin is warm and dry.       Significant Labs:   CBC:   Recent Labs   Lab  10/08/18   1346  10/09/18   0712  10/09/18   0814  10/09/18   1211   WBC  6.48  22.10*   --   16.06*   HGB  6.5*  5.8*   --   6.5*   HCT  19.5*  17.6*  19*  19.6*   PLT  9*  37*   --    --    , CMP:   Recent Labs   Lab  10/08/18   1346  10/09/18   0712  10/09/18   1211   NA  131*  130*  131*   K  3.9  3.8  3.7   CL  95  93*  99   CO2  22*  19*  17*   GLU  151*  103  145*   BUN  38*  45*  45*   CREATININE  1.2  2.6*  2.3*   CALCIUM   8.1*  8.0*  6.9*   PROT  7.2  6.4  5.4*   ALBUMIN  2.9*  2.6*  2.1*   BILITOT  0.7  1.0  1.2*   ALKPHOS  75  95  82   AST  26  55*  51*   ALT  65*  70*  67*   ANIONGAP  14  18*  15   EGFRNONAA  >60.0  25.7*  29.8*    and Coagulation:   Recent Labs   Lab  10/09/18   0712   INR  1.3*       Diagnostic Results:  I have reviewed all pertinent imaging results/findings within the past 24 hours.     CT non contrast abdomen pelvis and neck 10/9/18  Multifocal subsegmental opacities throughout the lungs as well as focal consolidation in the left lower lobe concerning for infectious process.  Recommend further evaluation and follow-up to document resolution.    Significant periodontal disease including marked erosive change of the oral maxilla with minimal residual bone  the oral cavity from the maxillary sinuses.  No focal abscess or inflammation noting limited evaluation secondary to lack of contrast.  Significant sinus disease as above    Bilateral cervical fullness concerning for lymphadenopathy.    Soft tissue haziness around the distal aorta/proximal iliac arteries which may relate to motion, however inflammatory change cannot be completely excluded.

## 2018-10-09 NOTE — HPI
63 yo man with AML, FLT3+, normal cytogenetics, h/o CVA, COPD, HTN, CVA in 2014 (residual left-sided deficit), HTN, 40 pack year smoking history, prostate cancer s/p prostatectomy (no radiation or chemo), who presents to the ED with x2 days of generalized weakness, fatigue, and fevers x2 days.  He was found to be pancytopenic and hypotensive in the ED.  BP was hypotensive and refractory to IV fluid resuscitation. Initial procalcitonin was 21 and lactic acid was 7. ICU was consulted and placed an a-line and central line.  His blood pressure improved after he was started on pressors.  His overall clinical picture concerning for septic shock so he was also started on empiric antibiotics.  CT non contrast revealed possible source: multifocal infiltrate in his lungs suggestive of pneumonia as the source.  Differentials include known dental caries and advanced periodontal disease.    Associated symptoms include intermittent epistaxis this past week.  Denies hemoptysis, hematemesis, hematochezia, melena, or hematuria.    Currently still has generalized weakness but denies shortness of breath.  Denies dizziness or lightheadedness.  Has substernal chest discomfort.  No known sick contacts.    BMT consulted for his known diagnosis of AML.

## 2018-10-09 NOTE — SIGNIFICANT EVENT
Critical Care Medicine Significant Event/Goals of Care Discussion    Patient moved from ED to ICU bed.  During transfer from transport bed to ICU bed, patient was noted to become unresponsive.  Patient was not following commands.  When asked to assist in transferring from one bed to another, patient rolled over to ICU bed.  Cardiac leads disconnected at this time for transfer.  Given lack of response, pulses were checked, and patient was found not to have a pulse.  ACLS was initiated and physicians were notified to come to bedside.  Patient received 4 rounds of high quality CPR along with epinephrine 1 mg IV once.  ROSC obtained.  Patient subsequently intubated for airway management.      Updated family at bedside, who were initially present during the initial code.  Discussed the current clinical course.  Noted currently that we are still are treating sepsis and septic shock.  Overall, discussed that the patient is in critical condition and he is requiring three vasopressors to maintain a blood pressure.  Additionally, noted that we are on the maximum dose of vasopressors and any further escalation may not be possible.  Discussed that compressions were used to help bring his pulse back, he is currently more sick than he was prior and compressions may not be helpful for a potential event after.  Family at this time noted that they do not want further compressions or cardioversion.      Discussed current care.  Noted that we are re-checking labs to see if there are any significant changes.  Informed that we are also increasing the amount of coverage to treat for a potential infection.  Son inquired if family can bring further members to bedside, noted that they can.    All questions answered at this time.      Tyler Doherty MD  Internal Medicine PGY-3  [phone]

## 2018-10-09 NOTE — ASSESSMENT & PLAN NOTE
- hold home statin for now given mental status, can restart once improved  - mild transaminitis not likely from underlying statin use

## 2018-10-09 NOTE — ASSESSMENT & PLAN NOTE
- hypotension, lactic acidosis of 7 on admission  - improving to 5 with improved organ perfusion  - likely related to septic shock, continue broad spectrum antibiotics, vasopressors as needed with MAP goal of 65  - trend lactic acids until normalized

## 2018-10-09 NOTE — HPI
Kevin Crews is a 62 y.o. gentleman with recently diagnosed AML (FLT3 positive), COPD on home O2 (no PFTs on file), active hepatitis B on treatment, CAD s/p PCI x 2 (2008), essential HTN, history of prostate CA s/p resection, history of TIA, and HLD who presents to [facility] for altered mental status and hypotension.  History is provided primarily by his wife who is at bedside.  She states he was usual state of health until yesterday, where he was more lethargic.  He went to see his cardiologist who noted that he had low blood pressures with SBP in the 70s,  But rechecked at 110/60.  At that time, he had labs drawn with anemia and thrombocytopenia, received 1 unit of platelets.  He was instructed to drink more water and follow up at a later time, which he tried to do when he went home.  However, he noted continued lethargy and started to become more confused along with new onset fever of 101 taken at home, which prompted his family to bring him to [facility] ED for further evaluation.  Here, he was noted to be hypotensive with SBP in the 60s, where he was given 30 ccs/kg of crystalloids.  Due to persistent hypotension, he was then started on peripheral levophed.  His antibiotic coverage initially consisted of vancomycin and cefepime.  Critical Care was consulted due to hypotension concerning for septic shock.      Recently diagnosed with AML after noted to have concerning blood after a blood donation.  Follows with [facility] hematology, where he was ruled not a candidate for 10 day Decitabine therapy due to significant dental caries, but was opted for Sorafenib + Vidaza.  Patient has not taken medication yet due to insurance approval.  He has been on antibiotics for removal of dental caries, of which he was currently on augmentin.  Recently admitted to [facility] for angioedema, where lisinopril was discontinued.  Noted to have CAD with PCI x 2 in 2008 in [place].  Remote history of Prostate CA s/p resection.  Wife notes significant weight  loss since he had moved to [place] early in 2018, but denies any appetite changes or nightsweats.  Does note patient has a chronic cough with mild productive sputum, no significant change from baseline.

## 2018-10-09 NOTE — SIGNIFICANT EVENT
Pt arrived via stretcher on cardiac monitor, levophed gtt infusing and 2L nasal cannula. Pt moaning and appears dyspneic. Told patient we were about to pull him over to ICU bed, pt preceded to try to roll onto bed. Informed patient we would help him and he continued to try to roll. Got patient on ICU bed, supine. Eyes rolled up, appeared to be agonal breathing. Not responding to voice. Checked for pulse, unable to find. Compressions immediately began. RNs at bedside. eICU triggered. MD notified by nearby RN- team then arrived to bedside.     Wife Lacy and son Kevin Meraz at bedside and witnessed event. Family walked out and  was notified to comfort them during this time. MD also spoke with family.

## 2018-10-09 NOTE — ASSESSMENT & PLAN NOTE
- s/p 30 ccs/kg IV crystalloid IV resuscitation, currently on levophed for vasopressor support  - differential includes distributive/septic shock versus hemorrhagic  - 4/4 SIRS criteria and 3/3 qSOFA concerning for septic shock  - while patient is anemic, no evidence of acute blood loss, and anemia can be better explained by underlying AML with significant blast load  - blood cultures x 2 ordered, pending  - initial concern for worsening source from dental/oropharynx/sinusses, but CT imaging without convincing evidence.  Noted cervical lymphadenopathy, unsure if reactive to possible underlying issue present there   - urine with 3 WBC and many bacteria, possibly infectious source given underlying immunosuppression.  CT imaging without any noted obstruction   - CT chest with bilateral patchy infiltrates, possible infectious etiology, no known episode of nausea or possible aspiration event, not profoundly hypoxic   - CT abdomen without any evidence of colitis or gallbladder abnormality  - overall, continue broad spectrum antibiotics with vancomycin, cefepime, and flagyl  - transfuse pRBC with goal hemoglobin of 7  - stress dose steroids ordered   - titrate vasopressors as capable, may need vasopressin   - CBC, vitals per unit routine  - BMT consulted, holding fungal coverage for now

## 2018-10-09 NOTE — H&P
[facility]  Critical Care Medicine  History & Physical    Patient Name: Kevin Crews  MRN: [id number]  Admission Date: 10/9/2018  Hospital Length of Stay: 0 days  Code Status: Full Code  Attending Physician: Ross Lara*   Primary Care Provider: Juju Banda MD   Principal Problem: <principal problem not specified>    Subjective:     HPI:  Kevin Crews is a 62 y.o. gentleman with recently diagnosed AML (FLT3 positive), COPD on home O2 (no PFTs on file), active hepatitis B on treatment, CAD s/p PCI x 2 (2008), essential HTN, history of prostate CA s/p resection, history of TIA, and HLD who presents to [facility] for altered mental status and hypotension.  History is provided primarily by his wife who is at bedside.  She states he was usual state of health until yesterday, where he was more lethargic.  He went to see his cardiologist who noted that he had low blood pressures with SBP in the 70s,  But rechecked at 110/60.  At that time, he had labs drawn who also noted anemia and thrombocytopenia, where he was going to get blood and platelets, but he was only able to receive 1 unit of platelets.  He was instructed to drink more water and follow up at a later time, which he tried to do when he went home.  However, he noted continued lethargy and started to become more confused along with new onset fever of 101 taken at home, which prompted his family to bring him to [facility] ED for further evaluation.  Here, he was noted to be hypotensive with SBP in the 60s, where he was given 30 ccs/kg of crystalloids.  Due to persistent hypotension, he was then started on peripheral levophed.  His antibiotic coverage initially consisted of vancomycin and cefepime.  Critical Care was consulted due to hypotension concerning for septic shock.      Recently diagnosed with AML after noted to have concerning blood after a blood donation.  Follows with [facility] hematology, where he was ruled not a candidate for 10  36.4 day Decitabine therapy due to significant dental caries, but was opted for Sorafenib + Vidaza.  Patient has not taken medication yet due to insurance approval.  He has been on antibiotics for removal of dental caries, of which he was currently on augmentin.  Recently admitted to [facility] for angioedema, where lisinopril was discontinued.  Noted to have CAD with PCI x 2 in 2008 in [place].  Remote history of Prostate CA s/p resection.  Wife notes significant weight loss since he had moved to [place] early in 2018, but denies any appetite changes or nightsweats.  Does note patient has a chronic cough with mild productive sputum, no significant change from baseline.      Hospital/ICU Course:  No notes on file     Past Medical History:   Diagnosis Date    COPD (chronic obstructive pulmonary disease)     Heart attack 2010    Hypertension     Leukemia     AML    Prostate cancer     Stroke        Past Surgical History:   Procedure Laterality Date    CORONARY ANGIOPLASTY WITH STENT PLACEMENT      EYE SURGERY      PROSTATE SURGERY         Review of patient's allergies indicates:   Allergen Reactions    Lisinopril Swelling     Angioedema    Effient [prasugrel] Rash       Family History     None        Tobacco Use    Smoking status: Current Every Day Smoker     Packs/day: 1.00     Years: 40.00     Pack years: 40.00     Types: Cigarettes    Smokeless tobacco: Never Used   Substance and Sexual Activity    Alcohol use: Yes     Comment: RARELY    Drug use: Yes     Types: Cocaine     Comment: HAVEN'T USED IN 2 WEEKS    Sexual activity: Not on file      Review of Systems   Unable to perform ROS: Acuity of condition     Objective:     Vital Signs (Most Recent):  Temp: 97.6 °F (36.4 °C) (10/09/18 1330)  Pulse: 101 (10/09/18 1330)  Resp: (!) 24 (10/09/18 1330)  BP: (!) 91/56 (10/09/18 1330)  SpO2: 95 % (10/09/18 1330) Vital Signs (24h Range):  Temp:  [97.4 °F (36.3 °C)-98.6 °F (37 °C)] 97.6 °F (36.4 °C)  Pulse:  []  101  Resp:  [16-66] 24  SpO2:  [83 %-100 %] 95 %  BP: ()/(43-79) 91/56  Arterial Line BP: ()/(40-65) 82/56   Weight: 82.1 kg (181 lb)  Body mass index is 25.24 kg/m².      Intake/Output Summary (Last 24 hours) at 10/9/2018 1341  Last data filed at 10/9/2018 1300  Gross per 24 hour   Intake 4751 ml   Output 365 ml   Net 4386 ml       Physical Exam   Constitutional: He appears well-developed. No distress.   HENT:   Head: Normocephalic and atraumatic.   Mouth/Throat: No oropharyngeal exudate.   Noted missing teeth, no significant abscesses noted    Eyes: Pupils are equal, round, and reactive to light. No scleral icterus.   Neck: Normal range of motion.   Cardiovascular: Normal rate, regular rhythm and normal heart sounds.   No murmur heard.  Pulmonary/Chest: Effort normal and breath sounds normal. No stridor. No respiratory distress.   Abdominal: Soft. Bowel sounds are normal. He exhibits no distension. There is no tenderness.   Musculoskeletal: Normal range of motion. He exhibits no edema.   Neurological:   Alert and oriented to person   Skin: Skin is warm and dry. No erythema.   Vitals reviewed.      Vents:     Lines/Drains/Airways     Central Venous Catheter Line                 Percutaneous Central Line Insertion/Assessment - triple lumen  10/09/18 0922 right internal jugular less than 1 day          Drain                 Urethral Catheter 10/09/18 0912 Double-lumen;Latex 16 Fr. less than 1 day          Arterial Line                 Arterial Line 10/09/18 1005 Left Radial less than 1 day          Peripheral Intravenous Line                 Peripheral IV - Single Lumen 10/09/18 0714 Right Wrist less than 1 day         Peripheral IV - Single Lumen 10/09/18 0720 Left Antecubital less than 1 day         Peripheral IV - Single Lumen 10/09/18 0833 Right Antecubital less than 1 day         Peripheral IV - Single Lumen 10/09/18 0912 Left Hand less than 1 day              Significant Labs:    CBC/Anemia  Profile:  Recent Labs   Lab  10/08/18   1346  10/09/18   0712  10/09/18   0814  10/09/18   0942  10/09/18   1211   WBC  6.48  22.10*   --    --   16.06*   HGB  6.5*  5.8*   --    --   6.5*   HCT  19.5*  17.6*  19*   --   19.6*   PLT  9*  37*   --    --    --    MCV  98  98   --    --   95   RDW  18.6*  18.6*   --    --   18.3*   RETIC   --    --    --   1.1   --         Chemistries:  Recent Labs   Lab  10/08/18   1346  10/09/18   0712  10/09/18   0942  10/09/18   1211   NA  131*  130*   --   131*   K  3.9  3.8   --   3.7   CL  95  93*   --   99   CO2  22*  19*   --   17*   BUN  38*  45*   --   45*   CREATININE  1.2  2.6*   --   2.3*   CALCIUM  8.1*  8.0*   --   6.9*   ALBUMIN  2.9*  2.6*   --   2.1*   PROT  7.2  6.4   --   5.4*   BILITOT  0.7  1.0   --   1.2*   ALKPHOS  75  95   --   82   ALT  65*  70*   --   67*   AST  26  55*   --   51*   MG   --   1.8   --   1.6   PHOS   --   4.0  4.5   --        Significant Imaging: I have reviewed all pertinent imaging results/findings within the past 24 hours.     CT head without contrast 10/9/2018    Significant motion limited examination as detailed above without definite acute intracranial hemorrhage or hydrocephalus.  Clinical correlation and further evaluation with repeat attempts for imaging when patient better able to tolerate scanning as warranted.    Probable remote fracture deformities lamina papyracea with moderate patchy opacities in the visualized paranasal sinuses    CT neck/chest/abdomen/pelvis 10/9/2018    Multifocal subsegmental opacities throughout the lungs as well as focal consolidation in the left lower lobe concerning for infectious process.  Recommend further evaluation and follow-up to document resolution.    Significant periodontal disease including marked erosive change of the oral maxilla with minimal residual bone  the oral cavity from the maxillary sinuses.  No focal abscess or inflammation noting limited evaluation secondary to lack of  contrast.  Significant sinus disease as above    Bilateral cervical fullness concerning for lymphadenopathy.    Soft tissue haziness around the distal aorta/proximal iliac arteries which may relate to motion, however inflammatory change cannot be completely excluded.    Assessment/Plan:     Neuro   Encephalopathy, metabolic    - noted encephalopathy on presentation  - likely septic encephalopathy and hypotension contributing to picture  - CT head unremarkable for acute intracranial abnormality  - overall, noted improvement in mentation after initiation of vasopressors and antibiotics, likely septic in nature  - continue to assess for source of sepsis, vasopressors for MAP > 65, continue broad spectrum antibiotics         History of CVA (cerebrovascular accident)    - known baseline L deficits, otherwise patient alert and following commands  - hold statin for now until patient with improvement in mental status         ENT   Poor dentition    - patient with noted poor dentition with numerous dental caries, overall possible source for sepsis  - CT head/neck with missing teeth and minimal bone  oral cavity from maxillary sinuses, but no convincing evidence of an abscess or significant tissue inflammation  - overall, continue broad spectrum antibiotics at this time given septic shock        Pulmonary   COPD (chronic obstructive pulmonary disease)    - noted current smoker, albuterol inhaler and atrovent on MAR, but no PFTs  - CT with convincing bilateral apical blebs, L > R suggestive of underlying pathology consistent with potential COPD   - currently on 2L NC, stated on home O2, need to clarify  - duo-nebs PRN         Cardiac/Vascular   Other hyperlipidemia    - hold home statin for now given mental status, can restart once improved  - mild transaminitis not likely from underlying statin use         Coronary artery disease involving native coronary artery of native heart without angina pectoris    - CAD s/p  PCI x 2 in 2008  - no anginal symptoms previously, troponin negative on first draw, suspect hypotension unrelated   - however, 2D echo with new inferolateral wall hypokinesis as well as global hypokinesis, possibly related to sepsis  - will continue to assess, hold home beta blocker at this time         Essential hypertension    - on home norvasc 10 mg PO daily and coreg 12.5 mg PO daily  - hold in setting of septic shock         Renal/   Lactic acidosis    - hypotension, lactic acidosis of 7 on admission  - improving to 5 with improved organ perfusion  - likely related to septic shock, continue broad spectrum antibiotics, vasopressors as needed with MAP goal of 65  - trend lactic acids until normalized         RUDI (acute kidney injury)    - Cr of 2.6 on admission with baseline 1.2, improved with IVF and vasopressors to 2.3  - possibly pre-renal from shock with hypoperfusion versus intrinsic ATN from hypoperfusion  - will need to further monitor kidney function, continue Is and Os  - avoid nephrotoxic medications  - renally dose medications as needed         ID   Septic shock    - s/p 30 ccs/kg IV crystalloid IV resuscitation, currently on levophed for vasopressor support  - differential includes distributive/septic shock versus hemorrhagic  - 4/4 SIRS criteria and 3/3 qSOFA concerning for septic shock  - while patient is anemic, no evidence of acute blood loss, and anemia can be better explained by underlying AML with significant blast load  - blood cultures x 2 ordered, pending  - initial concern for worsening source from dental/oropharynx/sinusses, but CT imaging without convincing evidence.  Noted cervical lymphadenopathy, unsure if reactive to possible underlying issue present there   - urine with 3 WBC and many bacteria, possibly infectious source given underlying immunosuppression.  CT imaging without any noted obstruction   - CT chest with bilateral patchy infiltrates, possible infectious etiology, no  known episode of nausea or possible aspiration event, not profoundly hypoxic   - CT abdomen without any evidence of colitis or gallbladder abnormality  - overall, continue broad spectrum antibiotics with vancomycin, cefepime, and flagyl  - transfuse pRBC with goal hemoglobin of 7  - stress dose steroids ordered   - titrate vasopressors as capable, may need vasopressin   - CBC, vitals per unit routine  - BMT consulted, holding fungal coverage for now         Immunology/Multi System   Hepatitis B core antibody positive    - recently started on hepatitis B treatment  - continue to hold for now pending improvement of mental status         Oncology   Acute blood loss anemia    - noted anemia of 5.8 on admission, 6 day prior  - likely due to hypoproliferation from underlying AML  - reticulocyte count ordered for assessment  - will need definitive treatment of AML for further improvement  - Transfuse for hemoglobin < 7        Acute myeloid leukemia not having achieved remission    - recent diagnosis of non M3 AML, FLT3 positive with normal cytogenetics  - supposed to initiate therapy at this time, pending approval  - BMT consulted, appreciate assistance         GI   Transaminitis    - persistently elevated transaminases in the 50s/60s  - likely related to underlying chronic hepatitis B, otherwise no worsening given episode of hypotension  - continue to trend at this time, can treat hepatitis B at later time once clinically improved             Critical Care Daily Checklist:    A: Awake: RASS Goal/Actual Goal:    Actual:     B: Spontaneous Breathing Trial Performed?     C: SAT & SBT Coordinated?  none                      D: Delirium: CAM-ICU     E: Early Mobility Performed? No   F: Feeding Goal:    Status:     Current Diet Order   Procedures    Diet NPO      AS: Analgesia/Sedation none   T: Thromboembolic Prophylaxis None given thrombocytopenia   H: HOB > 300 Yes   U: Stress Ulcer Prophylaxis (if needed) protonix 40 mg IV  daily   G: Glucose Control none   B: Bowel Function     I: Indwelling Catheter (Lines & Pineda) Necessity R TLC, 4 PIV, pineda catheter   D: De-escalation of Antimicrobials/Pharmacotherapies Vancomycin, cefepime, flagyl    Plan for the day/ETD Follow cultures, determine source, continue to treat sepsis with broad spectrum antibiotics    Code Status:  Family/Goals of Care: Full Code         Critical secondary to Patient has a condition that poses threat to life and bodily function: septic shock     Critical care was time spent personally by me on the following activities: development of treatment plan with patient or surrogate and bedside caregivers, discussions with consultants, evaluation of patient's response to treatment, examination of patient, ordering and performing treatments and interventions, ordering and review of laboratory studies, ordering and review of radiographic studies, pulse oximetry, re-evaluation of patient's condition. This critical care time did not overlap with that of any other provider or involve time for any procedures.     Tyler Doherty MD  Critical Care Medicine  [facility]

## 2018-10-10 NOTE — ASSESSMENT & PLAN NOTE
Untreated AML, FLT3+ presenting with septic shock. Initial assessment was prior to the patient coding.  Following the code, he is intubated and on 3 pressors.  Prognosis poor and even if he should recover from treatment for his septic shock, his AML remains untreated.  -Met with family today.  Told them we would be around if they had any more questions.  -Spoke to ICU team regarding prognosis.

## 2018-10-10 NOTE — CLINICAL REVIEW
Discussed updates regarding patient with Dr. Lew and Dr. Parekh.  Met with family at bedside today.  Unfortunately despite his recovery from his septic shock, his septicemia would be another set back and delay him from further treatment.  He is not a candidate for chemotherapy at this time.  Told them the time spent recovering from this infection would leave his leukemia untreated.  Given his current condition with his septicemia, intubation, and renal failure, it is unlikely any chemotherapy in the future would add quality to his life and may not even add any quantity given the toxicities and side effects.  Altogether, I told them that comfort care, from our professional opinion, is our recommendation.    Told family we are available for any more questions they might have and that the nurse can page at any time.    All questions answered to their satisfaction at this encounter.    Blaine Arevalo MD  Hematology Oncology Fellow PGY5  Pager [phone]

## 2018-10-10 NOTE — PLAN OF CARE
Problem: Patient Care Overview  Goal: Plan of Care Review  Outcome: Ongoing (interventions implemented as appropriate)    Recommendations     1. TF recommendations: Novasource @ 50 mL/hr to provide 2400 calories, 109 grams of protein, 860 mL fluid.               - Hold for residuals >500 mL; additional fluid per MD.   2. If able to extubate & advance diet, recommend renal diet (texture per SLP).   3. RD to monitor & follow-up.

## 2018-10-10 NOTE — PLAN OF CARE
Problem: Patient Care Overview  Goal: Plan of Care Review  Pt. Remains intubated/sedated, vss. Remains on Levo, epi, vaso, precedex, fentanyl and antibiotics, unable to wean off pressors. Dr. Edwards notified of Critical lab values and positive blood cx. . Frequent vitals and assessment per flowsheet, plan of care reviewed with Mr. Reyes and his spouse, questions/concerns adressed, will continue to monitor pt.

## 2018-10-10 NOTE — SUBJECTIVE & OBJECTIVE
Interval History/Significant Events: Patient overnight on 3 vasopressors.  Able to decrease to 2.  Precedex turned off, RASS of 0.  Noted oliguric, currently anuric with urine approximately 10 ccs/hr.  Overall, hyperkalemia, shifted twice today.  Family discussion regarding prognosis at this time, noted next step would be dialysis given his renal failure.  Family to further discuss.      Review of Systems   Unable to perform ROS: Intubated     Objective:     Vital Signs (Most Recent):  Temp: (!) 101 °F (38.3 °C) (10/10/18 1200)  Pulse: 60 (10/10/18 1409)  Resp: 12 (10/10/18 1400)  BP: 122/63 (10/09/18 1516)  SpO2: (!) 93 % (10/10/18 1409) Vital Signs (24h Range):  Temp:  [99.2 °F (37.3 °C)-101.5 °F (38.6 °C)] 101 °F (38.3 °C)  Pulse:  [] 60  Resp:  [10-35] 12  SpO2:  [87 %-100 %] 93 %  Arterial Line BP: ()/(48-76) 104/58   Weight: 88.2 kg (194 lb 7.1 oz)  Body mass index is 27.12 kg/m².      Intake/Output Summary (Last 24 hours) at 10/10/2018 1529  Last data filed at 10/10/2018 1437  Gross per 24 hour   Intake 5058.03 ml   Output 1445 ml   Net 3613.03 ml       Physical Exam   Constitutional: He appears well-developed. No distress.   Intubated, sedated    HENT:   Head: Normocephalic and atraumatic.   Mouth/Throat: No oropharyngeal exudate.   intubated   Eyes: Pupils are equal, round, and reactive to light. No scleral icterus.   Neck: Normal range of motion.   Cardiovascular: Normal rate, regular rhythm and normal heart sounds.   No murmur heard.  Pulmonary/Chest: Effort normal and breath sounds normal. No stridor. No respiratory distress.   Abdominal: Soft. Bowel sounds are normal. He exhibits no distension. There is no tenderness.   Musculoskeletal: Normal range of motion. He exhibits no edema.   Neurological:   Intubated, sedated    Skin: Skin is warm and dry. No erythema.   Vitals reviewed.      Vents:  Vent Mode: A/C (10/10/18 1409)  Set Rate: 20 bmp (10/10/18 1409)  Vt Set: 450 mL (10/10/18  1409)  Pressure Support: 0 cmH20 (10/10/18 1409)  PEEP/CPAP: 6 cmH20 (10/10/18 1409)  Oxygen Concentration (%): 40 (10/10/18 1409)  Peak Airway Pressure: 21 cmH2O (10/10/18 1409)  Plateau Pressure: 17 cmH20 (10/10/18 1409)  Total Ve: 12.1 mL (10/10/18 1409)  F/VT Ratio<105 (RSBI): (!) 27.56 (10/10/18 1144)  Lines/Drains/Airways     Central Venous Catheter Line                 Percutaneous Central Line Insertion/Assessment - triple lumen  10/09/18 0922 right internal jugular 1 day          Drain                 NG/OG Tube 10/09/18 1450 [place] sump 1 day         Urethral Catheter 10/09/18 0912 Double-lumen;Latex 16 Fr. 1 day          Airway                 Airway - Non-Surgical 10/09/18 1510 Endotracheal Tube 1 day          Arterial Line                 Arterial Line 10/09/18 1005 Left Radial 1 day          Peripheral Intravenous Line                 Peripheral IV - Single Lumen 10/09/18 0714 Right Wrist 1 day         Peripheral IV - Single Lumen 10/09/18 0720 Left Antecubital 1 day         Peripheral IV - Single Lumen 10/09/18 0912 Left Hand 1 day              Significant Labs:    CBC/Anemia Profile:  Recent Labs   Lab  10/09/18   0942   10/09/18   2329  10/10/18   0506  10/10/18   1253   WBC   --    < >  27.15*  33.02*  34.77*   HGB   --    < >  7.3*  7.1*  6.8*   HCT   --    < >  21.7*  22.1*  21.2*   PLT   --    < >  39*  35*  34*   MCV   --    < >  92  95  96   RDW   --    < >  18.6*  19.3*  19.6*   RETIC  1.1   --    --    --    --     < > = values in this interval not displayed.        Chemistries:  Recent Labs   Lab  10/09/18   0942   10/09/18   1504   10/09/18   2329  10/10/18   0506  10/10/18   1253   NA   --    < >  129*   < >  125*  124*  121*   K   --    < >  4.5   < >  5.1  6.2*  6.6*   CL   --    < >  97   < >  95  95  93*   CO2   --    < >  15*   < >  16*  13*  15*   BUN   --    < >  45*   < >  53*  56*  65*   CREATININE   --    < >  2.4*   < >  2.6*  2.8*  3.4*   CALCIUM   --    < >  7.1*   < >  6.8*   6.4*  6.3*   ALBUMIN   --    < >  2.3*   < >  2.2*  2.1*  2.0*   PROT   --    < >  5.7*   < >  5.8*  5.7*  5.4*   BILITOT   --    < >  1.4*   < >  2.5*  2.7*  3.0*   ALKPHOS   --    < >  79   < >  91  90  94   ALT   --    < >  86*   < >  150*  240*  674*   AST   --    < >  70*   < >  174*  276*  1,071*   MG   --    < >  1.6   < >  2.0  2.4  2.4   PHOS  4.5   --   7.6*   --    --   9.2*   --     < > = values in this interval not displayed.       Significant Imaging:  I have reviewed all pertinent imaging results/findings within the past 24 hours.

## 2018-10-10 NOTE — ASSESSMENT & PLAN NOTE
- Cr increasing, oliguric to anuric at this time  - high concern for iATN given degree of sepsis  - overall, will continue family discussions regarding goals of care and further prognosis  - discussed need for renal replacement therapy at this time, despite underlying and untreatable AML  - will continue family discussions at this time  - overall, actively shifting K and correcting underlying acidosis with bicarb gtt, insulin + D50, calcium gluconate for cardiac membrane stabilization  - continue q 6 labs at this time

## 2018-10-10 NOTE — PROGRESS NOTES
[facility]  Critical Care Medicine  Progress Note    Patient Name: Kevin Crews  MRN: [id number]  Admission Date: 10/9/2018  Hospital Length of Stay: 1 days  Code Status: Partial Code  Attending Provider: Ross Lara*  Primary Care Provider: Juju Banda MD   Principal Problem: Septic shock    Subjective:     HPI:  Kevin Crews is a 62 y.o. gentleman with recently diagnosed AML (FLT3 positive), COPD on home O2 (no PFTs on file), active hepatitis B on treatment, CAD s/p PCI x 2 (2008), essential HTN, history of prostate CA s/p resection, history of TIA, and HLD who [place] [facility] [place] altered mental status and hypotension.  History is provided primarily by his wife who is at bedside.  She states he was usual state of health until yesterday, where he was more lethargic.  He went to see his cardiologist who noted that he had low blood pressures with SBP in the 70s,  But rechecked at 110/60.  At that time, he had labs drawn who also noted anemia and thrombocytopenia, where he was going to get blood and platelets, but he was only able to receive 1 unit of platelets.  He was instructed to drink more water and follow up at a later time, which he tried to do when he went home.  However, he noted continued lethargy and started to become more confused along with new onset fever of 101 taken at home, which prompted his family to bring him to [facility] [place] for further evaluation.  Here, he was noted to be hypotensive with SBP in the 60s, where he was given 30 ccs/kg of crystalloids.  Due to persistent hypotension, he was then started on peripheral levophed.  His antibiotic coverage initially consisted of vancomycin and cefepime.  Critical Care was consulted due to hypotension concerning for septic shock.      Recently diagnosed with AML after noted to have concerning blood after a blood donation.  Follows with [facility] hematology, where he was ruled not a candidate for 10 day Decitabine therapy due  to significant dental caries, but was opted for Sorafenib + Vidaza.  Patient has not taken medication yet due to insurance approval.  He has been on antibiotics for removal of dental caries, of which he was currently on augmentin.  Recently admitted to [facility] for angioedema, where lisinopril was discontinued.  Noted to have CAD with PCI x 2 in 2008 in [place].  Remote history of Prostate CA s/p resection.  Wife notes significant weight loss since he had moved to [place] early in 2018, but denies any appetite changes or nightsweats.  Does note patient has a chronic cough with mild productive sputum, no significant change from baseline.      Interval History/Significant Events: Patient overnight on 3 vasopressors.  Able to decrease to 2.  Precedex turned off, RASS of 0.  Noted oliguric, currently anuric with urine approximately 10 ccs/hr.  Overall, hyperkalemia, shifted twice today.  Family discussion regarding prognosis at this time, noted next step would be dialysis given his renal failure.  Family to further discuss.      Review of Systems   Unable to perform ROS: Intubated     Objective:     Vital Signs (Most Recent):  Temp: (!) 101 °F (38.3 °C) (10/10/18 1200)  Pulse: 60 (10/10/18 1409)  Resp: 12 (10/10/18 1400)  BP: 122/63 (10/09/18 1516)  SpO2: (!) 93 % (10/10/18 1409) Vital Signs (24h Range):  Temp:  [99.2 °F (37.3 °C)-101.5 °F (38.6 °C)] 101 °F (38.3 °C)  Pulse:  [] 60  Resp:  [10-35] 12  SpO2:  [87 %-100 %] 93 %  Arterial Line BP: ()/(48-76) 104/58   Weight: 88.2 kg (194 lb 7.1 oz)  Body mass index is 27.12 kg/m².      Intake/Output Summary (Last 24 hours) at 10/10/2018 1529  Last data filed at 10/10/2018 1437  Gross per 24 hour   Intake 5058.03 ml   Output 1445 ml   Net 3613.03 ml       Physical Exam   Constitutional: He appears well-developed. No distress.   Intubated, sedated    HENT:   Head: Normocephalic and atraumatic.   Mouth/Throat: No oropharyngeal exudate.   intubated   Eyes: Pupils  are equal, round, and reactive to light. No scleral icterus.   Neck: Normal range of motion.   Cardiovascular: Normal rate, regular rhythm and normal heart sounds.   No murmur heard.  Pulmonary/Chest: Effort normal and breath sounds normal. No stridor. No respiratory distress.   Abdominal: Soft. Bowel sounds are normal. He exhibits no distension. There is no tenderness.   Musculoskeletal: Normal range of motion. He exhibits no edema.   Neurological:   Intubated, sedated    Skin: Skin is warm and dry. No erythema.   Vitals reviewed.      Vents:  Vent Mode: A/C (10/10/18 1409)  Set Rate: 20 bmp (10/10/18 1409)  Vt Set: 450 mL (10/10/18 1409)  Pressure Support: 0 cmH20 (10/10/18 1409)  PEEP/CPAP: 6 cmH20 (10/10/18 1409)  Oxygen Concentration (%): 40 (10/10/18 1409)  Peak Airway Pressure: 21 cmH2O (10/10/18 1409)  Plateau Pressure: 17 cmH20 (10/10/18 1409)  Total Ve: 12.1 mL (10/10/18 1409)  F/VT Ratio<105 (RSBI): (!) 27.56 (10/10/18 1144)  Lines/Drains/Airways     Central Venous Catheter Line                 Percutaneous Central Line Insertion/Assessment - triple lumen  10/09/18 0922 right internal jugular 1 day          Drain                 NG/OG Tube 10/09/18 1450 [place] sump 1 day         Urethral Catheter 10/09/18 0912 Double-lumen;Latex 16 Fr. 1 day          Airway                 Airway - Non-Surgical 10/09/18 1510 Endotracheal Tube 1 day          Arterial Line                 Arterial Line 10/09/18 1005 Left Radial 1 day          Peripheral Intravenous Line                 Peripheral IV - Single Lumen 10/09/18 0714 Right Wrist 1 day         Peripheral IV - Single Lumen 10/09/18 0720 Left Antecubital 1 day         Peripheral IV - Single Lumen 10/09/18 0912 Left Hand 1 day              Significant Labs:    CBC/Anemia Profile:  Recent Labs   Lab  10/09/18   0942   10/09/18   2329  10/10/18   0506  10/10/18   1253   WBC   --    < >  27.15*  33.02*  34.77*   HGB   --    < >  7.3*  7.1*  6.8*   HCT   --    < >  21.7*   22.1*  21.2*   PLT   --    < >  39*  35*  34*   MCV   --    < >  92  95  96   RDW   --    < >  18.6*  19.3*  19.6*   RETIC  1.1   --    --    --    --     < > = values in this interval not displayed.        Chemistries:  Recent Labs   Lab  10/09/18   0942   10/09/18   1504   10/09/18   2329  10/10/18   0506  10/10/18   1253   NA   --    < >  129*   < >  125*  124*  121*   K   --    < >  4.5   < >  5.1  6.2*  6.6*   CL   --    < >  97   < >  95  95  93*   CO2   --    < >  15*   < >  16*  13*  15*   BUN   --    < >  45*   < >  53*  56*  65*   CREATININE   --    < >  2.4*   < >  2.6*  2.8*  3.4*   CALCIUM   --    < >  7.1*   < >  6.8*  6.4*  6.3*   ALBUMIN   --    < >  2.3*   < >  2.2*  2.1*  2.0*   PROT   --    < >  5.7*   < >  5.8*  5.7*  5.4*   BILITOT   --    < >  1.4*   < >  2.5*  2.7*  3.0*   ALKPHOS   --    < >  79   < >  91  90  94   ALT   --    < >  86*   < >  150*  240*  674*   AST   --    < >  70*   < >  174*  276*  1,071*   MG   --    < >  1.6   < >  2.0  2.4  2.4   PHOS  4.5   --   7.6*   --    --   9.2*   --     < > = values in this interval not displayed.       Significant Imaging:  I have reviewed all pertinent imaging results/findings within the past 24 hours.    Assessment/Plan:     Neuro   Acute metabolic encephalopathy    - noted encephalopathy on presentation  - septic encephalopathy, on sedation, can assess when patient is off significant vasopressor support         History of CVA (cerebrovascular accident)    - hold statin for now         ENT   Poor dentition    - patient with noted poor dentition with numerous dental caries, overall possible source for sepsis  - CT head/neck with missing teeth and minimal bone  oral cavity from maxillary sinuses, but no convincing evidence of an abscess or significant tissue inflammation  - overall, continue broad spectrum antibiotics at this time         Pulmonary   COPD (chronic obstructive pulmonary disease)    - intubated given septic shock, not on  significant oxygen requirements   - goal sat 88 - 92  - duo nebs q 4   - currently sedated, can assess SBT if patient has clinical improvement         Cardiac/Vascular   Other hyperlipidemia    - hold home statin for now given mental status, can restart once improved          Coronary artery disease involving native coronary artery of native heart without angina pectoris    - CAD s/p PCI x 2 in 2008  - no anginal symptoms previously, troponin negative on first draw, suspect hypotension unrelated   - however, 2D echo with new inferolateral wall hypokinesis as well as global hypokinesis, possibly related to sepsis  - will continue to assess, hold home beta blocker at this time         Essential hypertension    - on home norvasc 10 mg PO daily and coreg 12.5 mg PO daily  - hold in setting of septic shock         Renal/   Lactic acidosis    - still with lactic acidosis  - from underlying septic shock         RUDI (acute kidney injury)    - Cr increasing, oliguric to anuric at this time  - high concern for iATN given degree of sepsis  - overall, will continue family discussions regarding goals of care and further prognosis  - discussed need for renal replacement therapy at this time, despite underlying and untreatable AML  - will continue family discussions at this time  - overall, actively shifting K and correcting underlying acidosis with bicarb gtt, insulin + D50, calcium gluconate for cardiac membrane stabilization  - continue q 6 labs at this time        ID   * Septic shock    - s/p 30 ccs/kg IV crystalloid IV resuscitation, weaned off epinephrine, currently on max dose levophed and vasopressors   - gram negative bacteremia, following up on cultures  - serratia grown in sputum, unknown significance at this time  - overall, continue broad spectrum antibiotics, titrate down vasopressors         Immunology/Multi System   Hepatitis B core antibody positive    - recently started on hepatitis B treatment  - continue to  hold for now pending improvement of mental status         Oncology   Acute blood loss anemia    - noted anemia of 5.8 on admission, 6 day prior  - likely due to hypoproliferation from underlying AML  - reticulocyte count ordered for assessment  - will need definitive treatment of AML for further improvement  - Transfuse for hemoglobin < 7        Acute myeloid leukemia not having achieved remission    - recent diagnosis of non M3 AML, FLT3 positive with normal cytogenetics  - BMT consulted, noted progressive disease, unlikely treatable  - will continue discussion with family at this time         GI   Transaminitis    - persistently elevated transaminases in the 50s/60s  - likely related to underlying chronic hepatitis B, otherwise no worsening given episode of hypotension  - continue to trend at this time, can treat hepatitis B at later time once clinically improved            Critical Care Daily Checklist:    A: Awake: RASS Goal/Actual Goal: RASS Goal: -2-->light sedation  Actual: Perkins Agitation Sedation Scale (RASS): Moderate sedation   B: Spontaneous Breathing Trial Performed?     C: SAT & SBT Coordinated?  none                      D: Delirium: CAM-ICU Overall CAM-ICU: Positive   E: Early Mobility Performed? No   F: Feeding Goal: Goals: Meet % EEN, EPN  Status: Nutrition Goal Status: new   Current Diet Order   Procedures    Diet NPO      AS: Analgesia/Sedation Fentanyl gtt   T: Thromboembolic Prophylaxis none   H: HOB > 300 Yes   U: Stress Ulcer Prophylaxis (if needed) protonix 40 mg IV daily   G: Glucose Control none   B: Bowel Function     I: Indwelling Catheter (Lines & Pineda) Necessity ET tube, PIV x 3, arterial line, pineda catheter, R IJ central line   D: De-escalation of Antimicrobials/Pharmacotherapies Vancomycin, cefepime, flagyl    Plan for the day/ETD Wean vasopressors, family discussion, possible CRRT    Code Status:  Family/Goals of Care: Partial Code         Critical secondary to Patient  has a condition that poses threat to life and bodily function: Septic shock       Critical care was time spent personally by me on the following activities: development of treatment plan with patient or surrogate and bedside caregivers, discussions with consultants, evaluation of patient's response to treatment, examination of patient, ordering and performing treatments and interventions, ordering and review of laboratory studies, ordering and review of radiographic studies, pulse oximetry, re-evaluation of patient's condition. This critical care time did not overlap with that of any other provider or involve time for any procedures.     Tyler Doherty MD  Critical Care Medicine  [facility]

## 2018-10-10 NOTE — ASSESSMENT & PLAN NOTE
- recent diagnosis of non M3 AML, FLT3 positive with normal cytogenetics  - BMT consulted, noted progressive disease, unlikely treatable  - will continue discussion with family at this time

## 2018-10-10 NOTE — PLAN OF CARE
Problem: Patient Care Overview  Goal: Plan of Care Review  Outcome: Ongoing (interventions implemented as appropriate)    See vital signs and assessments in flowsheets. See below for updates on today's progress.     Pulmonary: intubated. 24 at lip. Coarse/ diminished lung sounds. Settings:20/450/40%/8. Asynchronous with vent and breathing over vent. Irregular breaths    Cardiovascular: ST. Pulses palpable. Skin now warm. Cool upon arrival. Vasopressors infusing to maintain map >65. L radial arterial line. RIJ central line.    Neurological: R pupil blown, fixed. L pupil 2, brisk reaction. LLE withdraws from noxious stimuli. All other extremities do not. Per family patient has L sided deficits from previous stroke. Fentanyl gtt infusing for comfort and VO to titrate up to try to maintain synchrony with vent. Precedex gtt initiated    Gastrointestinal: distended abd. Taut. Active BS. OGT place, marked with sharpie.     Genitourinary: pineda in place. See I&Os    Endocrine: blood glucose checks    Integumentary/Other: preventive dressings placed. Heel boots on.     Infusions: fentanyl, precedex, levophed, epinephrine, vasopressin     plan of care communicated and reviewed with Kevin Crews and family. All concerns addressed. Will continue to monitor.

## 2018-10-10 NOTE — PLAN OF CARE
Juju Banda MD  [address] [place] [address] / [place] [address]    [facility] - [place], [place] - [address] [facility] [address]  [address]  [place] [address]  Phone: [phone] Fax: [phone]    Payor: MEDICAID / Plan: [facility] COMMUNITY PLAN [facility] ([place] MEDICAID) / Product Type: Managed Medicaid /     Future Appointments   Date Time Provider Department Center   10/11/2018  8:20 AM LAB, HEMONC CANCER BLDG [facility] LAB [facility] [place] [facility]   10/11/2018  9:30 AM Iram Parekh MD [facility] HEM ONC [place] [facility]   10/11/2018 10:30 AM [facility], CHEMO [facility]     Extended Emergency Contact Information  Primary Emergency Contact: Fco Crews  Address: [address] [place] [address] [place] [address] [facility]  Mobile Phone: [phone]  Relation: Brother  Secondary Emergency Contact: Joana Crews  Address: [facility] [place] [address] [facility]  Home Phone: [phone]  Relation: Spouse     10/10/18 1553   Discharge Assessment   Assessment Type Discharge Planning Assessment   Confirmed/corrected address and phone number on facesheet? No   Assessment information obtained from? Medical Record   Expected Length of Stay (days) 5   Communicated expected length of stay with patient/caregiver no   Prior to hospitilization cognitive status: Alert/Oriented   Prior to hospitalization functional status: Needs Assistance   Current cognitive status: Coma/Sedated/Intubated   Current Functional Status: Completely Dependent   Facility Arrived From: ED admit   Lives With child(stuart), adult;other (see comments)   Able to Return to Prior Arrangements unable to determine at this time (comments)   Is patient able to care for self after discharge? Unable to determine at this time (comments)   Who are your caregiver(s) and their phone number(s)? Joana Crews (Spouse) [phone]    Patient's perception of  discharge disposition other (comments)  (KEEGAN)   Readmission Within The Last 30 Days current reason for admission unrelated to previous admission   If yes, most recent facility name: [facility]   Patient currently being followed by outpatient case management? No   Equipment Currently Used at Home cane, straight;shower chair   Do you have any problems affording any of your prescribed medications? TBD   Is the patient taking medications as prescribed? yes   Does the patient have transportation home? Yes   Transportation Available family or friend will provide   Does the patient receive services at the [facility]madin Clinic? No   Discharge Plan A Other  (Dependent upon hospital progression)     DEBBIE Guerrero with [facility] [phone]    Siobhan Salinas RN, BSN, CCM  Case Management  [facility]  Ext. [phone]

## 2018-10-10 NOTE — ASSESSMENT & PLAN NOTE
- noted encephalopathy on presentation  - septic encephalopathy, on sedation, can assess when patient is off significant vasopressor support

## 2018-10-10 NOTE — PLAN OF CARE
Problem: Patient Care Overview  Goal: Plan of Care Review  Outcome: Ongoing (interventions implemented as appropriate)    No acute events throughout day. See vital signs and assessments in flowsheets. See below for updates on today's progress.     Pulmonary: remains on vent: FiO2 40% with SpO2 maintaining >90%    Cardiovascular: fluctuating between Afib and SR with rates 50s-70s; MAP maintained >65 with high dose of Levo    Neurological: no motor response noted while on Fentanyl; C/C/G reflexes +; Tmax 101 - ice packs applied    Gastrointestinal: NGT in place; kayexelate administered as order; no BM    Genitourinary: pineda intact with UOP 15-45mL/hr - Cr continuing to trend upwards    Endocrine: K shifted x2 this shift with no noted drop in BG after insulin administration; K remains elevated    Integumentary/Other: scars, but otherwise CDI; heel boots and foam dressings in place    Infusions: Epi stopped; Levo, Vaso and Fentanyl gtts continue; Bicarb gtt initiated    Plan of care (WOC/comfort) communicated and reviewed with Kevin Crews and family. All concerns addressed. Will continue to monitor.

## 2018-10-10 NOTE — CONSULTS
"  [facility]  Adult Nutrition  Consult Note    SUMMARY     Recommendations    1. TF recommendations: Novasource @ 50 mL/hr to provide 2400 calories, 109 grams of protein, 860 mL fluid.    - Hold for residuals >500 mL; additional fluid per MD.   2. If able to extubate & advance diet, recommend renal diet (texture per SLP).   3. RD to monitor & follow-up.    Goals: Meet % EEN, EPN  Nutrition Goal Status: new  Communication of RD Recs: reviewed with RN    Reason for Assessment    Reason for Assessment: consult  Diagnosis: other (see comments)(Septic shock, AML)  Relevant Medical History: HTN, Ca  Interdisciplinary Rounds: attended    General Information Comments: Pt intubated, sedated. Family member at bedside reports pt w/ fair appetite PTA, unsure of UBW. Chart review shows wt ranges from 174-200# over the past 6 months. RUDI noted. Patient appears well nourished but RD unable to determine patient's UBW & PO intake PTA. Therefore RD unable to determine if patient meets criteria for malnutrition at this time.  Nutrition Discharge Planning: Unable to determine    Nutrition/Diet History    Patient Reported Diet/Restrictions/Preferences: other (see comments)(KEEGAN)  Factors Affecting Nutritional Intake: NPO, on mechanical ventilation    Anthropometrics    Temp: (!) 100.9 °F (38.3 °C)  Height Method: Stated  Height: 5' 11" (180.3 cm)  Height (inches): 71 in  Weight Method: Bed Scale  Weight: 88.2 kg (194 lb 7.1 oz)  Weight (lb): 194.45 lb  Ideal Body Weight (IBW), Male: 172 lb  % Ideal Body Weight, Male (lb): 113.05 lb  BMI (Calculated): 27.2  BMI Grade: 25 - 29.9 - overweight  Usual Body Weight (UBW), kg: (174-200#)    Lab/Procedures/Meds    Pertinent Labs Reviewed: reviewed  Pertinent Labs Comments: Na 124, K 6.2, BUN 56, Creat 2.8, GFR 27.1, Gluc 191, P 9.2, A1C 5.7  Pertinent Medications Reviewed: reviewed  Pertinent Medications Comments: Precedexm Fentanyl, Levophed, Vasopressin    Physical " Findings/Assessment    Overall Physical Appearance: nourished, on ventilator support  Tubes: orogastric tube  Oral/Mouth Cavity: WDL  Skin: intact    Estimated/Assessed Needs    Weight Used For Calorie Calculations: 88.2 kg (194 lb 7.1 oz)     Energy Calorie Requirements (kcal): 2313 kcal/d  Energy Need Method: [facility]     Protein Requirements: 106-133 g/d (1.2-1.5 g/kg)  Weight Used For Protein Calculations: 88.2 kg (194 lb 7.1 oz)     Fluid Need Method: other (see comments)(Per MD or 1 mL/kcal)    Nutrition Prescription Ordered    Current Diet Order: NPO    Evaluation of Received Nutrient/Fluid Intake    Comments: LBM: 10/4    Nutrition Risk    Level of Risk/Frequency of Follow-up: (2x/week)     Assessment and Plan    Nutrition Problem  Inadequate energy intake    Related to (etiology):   Inability to consume sufficient energy    Signs and Symptoms (as evidenced by):   NPO with no alternate means of nutrition     Nutrition Diagnosis Status:   New     Monitor and Evaluation    Food and Nutrient Intake: energy intake, food and beverage intake, enteral nutrition intake  Food and Nutrient Adminstration: diet order, enteral and parenteral nutrition administration  Physical Activity and Function: nutrition-related ADLs and IADLs  Anthropometric Measurements: weight, weight change  Biochemical Data, Medical Tests and Procedures: lipid profile, gastrointestinal profile, electrolyte and renal panel, inflammatory profile, glucose/endocrine profile  Nutrition-Focused Physical Findings: overall appearance     Nutrition Follow-Up    RD Follow-up?: Yes

## 2018-10-10 NOTE — ASSESSMENT & PLAN NOTE
- patient with noted poor dentition with numerous dental caries, overall possible source for sepsis  - CT head/neck with missing teeth and minimal bone  oral cavity from maxillary sinuses, but no convincing evidence of an abscess or significant tissue inflammation  - overall, continue broad spectrum antibiotics at this time

## 2018-10-10 NOTE — CONSULTS
[facility]  Hematology/Oncology  Consult Note    Patient Name: Kevin Crews  MRN: [id number]  Admission Date: 10/9/2018  Hospital Length of Stay: 0 days  Code Status: Partial Code   Attending Provider: Ross Lara*  Consulting Provider: Blaine Arevalo MD  Primary Care Physician: Juju Banda MD  Principal Problem:Septic shock    Inpatient consult to Hematology/Oncology  Consult performed by: Blaine Arevalo MD  Consult ordered by: Tyler Doherty MD        Subjective:     HPI:  63 yo man with AML, FLT3+, normal cytogenetics, h/o CVA, COPD, HTN, CVA in 2014 (residual left-sided deficit), HTN, 40 pack year smoking history, prostate cancer s/p prostatectomy (no radiation or chemo), who presents to the ED with x2 days of generalized weakness, fatigue, and fevers x2 days.  He was found to be pancytopenic and hypotensive in the ED.  BP was hypotensive and refractory to IV fluid resuscitation. Initial procalcitonin was 21 and lactic acid was 7. ICU was consulted and placed an a-line and central line.  His blood pressure improved after he was started on pressors.  His overall clinical picture concerning for septic shock so he was also started on empiric antibiotics.  CT non contrast revealed possible source: multifocal infiltrate in his lungs suggestive of pneumonia as the source.  Differentials include known dental caries and advanced periodontal disease.    Associated symptoms include intermittent epistaxis this past week.  Denies hemoptysis, hematemesis, hematochezia, melena, or hematuria.    Currently still has generalized weakness but denies shortness of breath.  Denies dizziness or lightheadedness.  Has substernal chest discomfort.  No known sick contacts.    BMT consulted for his known diagnosis of AML.    Oncology Treatment Plan:   OP AZACITADINE 7-DAY (SUB-Q)    Medications:  Continuous Infusions:   norepinephrine bitartrate-D5W 0.6 mcg/kg/min (10/09/18 1347)     Scheduled Meds:    ceFEPime (MAXIPIME) IVPB  2 g Intravenous Q12H    hydrocortisone sodium succinate  100 mg Intravenous Q8H    metronidazole  500 mg Intravenous Q8H    sodium chloride 0.9%  3 mL Intravenous Q8H    [START ON 10/10/2018] vancomycin (VANCOCIN) IVPB  1,000 mg Intravenous Q24H     PRN Meds:sodium chloride, sodium chloride, sodium chloride, sodium chloride, dextrose 50%, glucagon (human recombinant)     Review of patient's allergies indicates:   Allergen Reactions    Lisinopril Swelling     Angioedema    Effient [prasugrel] Rash        Past Medical History:   Diagnosis Date    COPD (chronic obstructive pulmonary disease)     Heart attack 2010    Hypertension     Leukemia     AML    Prostate cancer     Stroke      Past Surgical History:   Procedure Laterality Date    CORONARY ANGIOPLASTY WITH STENT PLACEMENT      EYE SURGERY      PROSTATE SURGERY       Family History     None        Tobacco Use    Smoking status: Current Every Day Smoker     Packs/day: 1.00     Years: 40.00     Pack years: 40.00     Types: Cigarettes    Smokeless tobacco: Never Used   Substance and Sexual Activity    Alcohol use: Yes     Comment: RARELY    Drug use: Yes     Types: Cocaine     Comment: HAVEN'T USED IN 2 WEEKS    Sexual activity: Not on file       Review of Systems   Constitutional: Positive for chills, fatigue and fever.   HENT: Positive for nosebleeds.    Respiratory: Positive for shortness of breath.    Cardiovascular: Positive for chest pain. Negative for leg swelling.   Gastrointestinal: Negative for abdominal distention, abdominal pain, constipation, diarrhea, nausea and vomiting.   Genitourinary: Negative for dysuria.   Neurological: Positive for weakness. Negative for dizziness and light-headedness.   Hematological: Bruises/bleeds easily.   Psychiatric/Behavioral: Negative for confusion.     Objective:     Vital Signs (Most Recent):  Temp: 97.6 °F (36.4 °C) (10/09/18 1330)  Pulse: 101 (10/09/18 1330)  Resp: (!) 24  (10/09/18 1330)  BP: (!) 91/56 (10/09/18 1330)  SpO2: 95 % (10/09/18 1330) Vital Signs (24h Range):  Temp:  [97.4 °F (36.3 °C)-98.6 °F (37 °C)] 97.6 °F (36.4 °C)  Pulse:  [] 101  Resp:  [16-66] 24  SpO2:  [83 %-100 %] 95 %  BP: ()/(43-79) 91/56  Arterial Line BP: ()/(40-65) 82/56     Weight: 82.1 kg (181 lb)  Body mass index is 25.24 kg/m².  Body surface area is 2.03 meters squared.      Intake/Output Summary (Last 24 hours) at 10/9/2018 1404  Last data filed at 10/9/2018 1300  Gross per 24 hour   Intake 4751 ml   Output 365 ml   Net 4386 ml       Physical Exam   Constitutional: He appears well-developed. He appears distressed.   HENT:   Head: Normocephalic.   Eyes: Pupils are equal, round, and reactive to light. No scleral icterus.   Neck: Normal range of motion.   Cardiovascular: Regular rhythm and normal heart sounds. Exam reveals no gallop and no friction rub.   No murmur heard.  tachycardic   Pulmonary/Chest: Effort normal and breath sounds normal. No respiratory distress. He has no wheezes. He has no rales.   Abdominal: Soft. Bowel sounds are normal. He exhibits no distension and no mass. There is no tenderness. There is no guarding.   Musculoskeletal: He exhibits no edema.   Neurological: He is alert.   Skin: Skin is warm and dry.       Significant Labs:   CBC:   Recent Labs   Lab  10/08/18   1346  10/09/18   0712  10/09/18   0814  10/09/18   1211   WBC  6.48  22.10*   --   16.06*   HGB  6.5*  5.8*   --   6.5*   HCT  19.5*  17.6*  19*  19.6*   PLT  9*  37*   --    --    , CMP:   Recent Labs   Lab  10/08/18   1346  10/09/18   0712  10/09/18   1211   NA  131*  130*  131*   K  3.9  3.8  3.7   CL  95  93*  99   CO2  22*  19*  17*   GLU  151*  103  145*   BUN  38*  45*  45*   CREATININE  1.2  2.6*  2.3*   CALCIUM  8.1*  8.0*  6.9*   PROT  7.2  6.4  5.4*   ALBUMIN  2.9*  2.6*  2.1*   BILITOT  0.7  1.0  1.2*   ALKPHOS  75  95  82   AST  26  55*  51*   ALT  65*  70*  67*   ANIONGAP  14  18*  15    EGFRNONAA  >60.0  25.7*  29.8*    and Coagulation:   Recent Labs   Lab  10/09/18   0712   INR  1.3*       Diagnostic Results:  I have reviewed all pertinent imaging results/findings within the past 24 hours.     CT non contrast abdomen pelvis and neck 10/9/18  Multifocal subsegmental opacities throughout the lungs as well as focal consolidation in the left lower lobe concerning for infectious process.  Recommend further evaluation and follow-up to document resolution.    Significant periodontal disease including marked erosive change of the oral maxilla with minimal residual bone  the oral cavity from the maxillary sinuses.  No focal abscess or inflammation noting limited evaluation secondary to lack of contrast.  Significant sinus disease as above    Bilateral cervical fullness concerning for lymphadenopathy.    Soft tissue haziness around the distal aorta/proximal iliac arteries which may relate to motion, however inflammatory change cannot be completely excluded.      Assessment/Plan:     Acute myeloid leukemia not having achieved remission    Untreated AML, FLT3+ presenting with septic shock. Initial assessment was prior to the patient coding.  Following the code, he is intubated and on 3 pressors.  Prognosis poor and even if he should recover from treatment for his septic shock, his AML remains untreated.  -Met with family today.  Told them we would be around if they had any more questions.  -Spoke to ICU team regarding prognosis.              Thank you for your consult. I will follow-up with patient. Please contact us if you have any additional questions.    Blaine Arevalo MD  Hematology/Oncology  [facility]

## 2018-10-10 NOTE — CARE UPDATE
Discussed with family regarding patient's status.  Family at this time noting that they want to pursue comfort options.  Inquired if they found that the patient is currently comfortable, where wife notes that she believes he is currently not suffering.  At this time, noted that they are waiting for some family members.  Otherwise, discussed that we will currently stop checking labs, especially his potassium that we are trying to correct.  Family noted that that is okay at this time.      All questions answered at this time.    Tyler Doherty MD  Internal Medicine PGY-3  [phone]

## 2018-10-10 NOTE — ASSESSMENT & PLAN NOTE
- s/p 30 ccs/kg IV crystalloid IV resuscitation, weaned off epinephrine, currently on max dose levophed and vasopressors   - gram negative bacteremia, following up on cultures  - serratia grown in sputum, unknown significance at this time  - overall, continue broad spectrum antibiotics, titrate down vasopressors

## 2018-10-10 NOTE — ASSESSMENT & PLAN NOTE
- intubated given septic shock, not on significant oxygen requirements   - goal sat 88 - 92  - duo nebs q 4   - currently sedated, can assess SBT if patient has clinical improvement

## 2018-10-11 PROBLEM — Z51.5 PALLIATIVE CARE ENCOUNTER: Status: ACTIVE | Noted: 2018-01-01

## 2018-10-11 PROBLEM — B96.5 BACTEREMIA DUE TO PSEUDOMONAS: Status: ACTIVE | Noted: 2018-01-01

## 2018-10-11 PROBLEM — R78.81 BACTEREMIA DUE TO PSEUDOMONAS: Status: ACTIVE | Noted: 2018-01-01

## 2018-10-11 NOTE — ASSESSMENT & PLAN NOTE
- noted anemia of 5.8 on admission, 6 day prior  - likely due to hypoproliferation from underlying AML  - will need definitive treatment of AML for further improvement  - Transfuse for hemoglobin < 7

## 2018-10-11 NOTE — CONSULTS
[facility]  Palliative Medicine  Consult Note    Patient Name: Kevin Crews  MRN: [id number]  Admission Date: 10/9/2018  Hospital Length of Stay: 2 days  Code Status: DNR   Attending Provider: Ross Lara*  Consulting Provider: ELIDA Temple  Primary Care Physician: Juju Banda MD  Principal Problem:Septic shock    Patient information was obtained from spouse/SO, past medical records and ER records.      Inpatient consult to Palliative Care  Consult performed by: ELIDA Mccray  Consult ordered by: Priyanka Odom MD  Reason for consult: withdrawal of life support   Assessment/Recommendations: Chart reviewed and patient discussed with critical care fellow         Assessment/Plan:     Palliative care encounter    Palliative care consulted for comfort care.  Palliative care APRN met with patient, family and Dr. Chavez, [facility] fellow at bedside.  Mr. Crews unable to participate in conversation.  Palliative care introduced to family.     Impression: Mr. Crwes is 63 yo gentleman.  He is intubated, unresponsive to verbal and tactile stimulation. Unable to follow commands,  Receiving pressor support with levophed and is on continuous fentanyl drip.  Appears comfortable with no facial grimacing or moaning.  Family is amenable to withdrawal of life support - to proceed tonight.     Goals of Care:  - No advanced directives on file. Wife is next of kin for medical decisions   - Wife and sons with complete understanding of current clinical condition.  Family's request is Mr. Crews will not suffer  - DNR per primary team.   - Withdrawal of life support symptom management:   Critical care to utilize palliative care withdrawal of life support order set.  Pain: currently receiving fentanyl drip at 200 mcg per hour, would be beneficial to continue with fentanyl drip at 200 mcg per hour and have fentanyl 25  mcg IVP every 10 mins as needed for pain   Anxiety  Lorazepam  1 mg IVP every 30 mins as needed for anxiety  Secretions  Glycopyrrolate 0.2 mg IVP three times daily as needed for secretions. Please give dose prior to extubation.     Plan:   - Critical care to utilize withdrawal of life support order set, see above symptom management recommendations   - Please contact  prior to extubation   - If patient survives withdrawal of life support and remains stable for transport, consider inpatient hospice consult.   - Emotional support given.   - Bereavement tray.         Thank you for consult and opportunity to participate in Mr. Crews's care.                             Thank you for your consult. I will follow-up with patient. Please contact us if you have any additional questions.    Subjective:     HPI:   Mr. Crews is a 62 y.o. gentleman with recently diagnosed AML (FLT3 positive), COPD on home O2 (no PFTs on file), active hepatitis B on treatment, CAD s/p PCI x 2 (2008), essential HTN, history of prostate CA s/p resection, history of TIA, and HLD who presents to [facility] for altered mental status and hypotension.    As per chart review HPI includes    Septic Shock - broad Abx, follow cultures, pan CT scan. Concern for lung or oral source.   Acute metabolic encephalopathy - multifactorial   AML - BMT consult. Per report pt has a dismal prognosis from this in itself   Acute kidney injury - monitor   Anemia/Thrombocytopenia      Cardiac arrest - PEA arrest while moving from one bed to another. Subsequently has needed max dose pressors.  Goals of care - no CPR is appropriate given severity of disease and AML    As per primary team, family was in agreement with withdrawal of life support and then changed their decision.  After  CCS spoke with family today, they are now in agreement to proceed with withdrawal of life support.   Palliative care consulted for comfort care.     Hospital Course:  No notes on file    Interval History:     Past Medical History:   Diagnosis Date     COPD (chronic obstructive pulmonary disease)     Heart attack     Hypertension     Leukemia     AML    Prostate cancer     Stroke        Past Surgical History:   Procedure Laterality Date    CORONARY ANGIOPLASTY WITH STENT PLACEMENT      EYE SURGERY      PROSTATE SURGERY         Review of patient's allergies indicates:   Allergen Reactions    Lisinopril Swelling     Angioedema    Effient [prasugrel] Rash       Medications:  Continuous Infusions:   dexmedetomidine (PRECEDEX) infusion 0.2 mcg/kg/hr (10/11/18 1600)    epinephrine infusion Stopped (10/10/18 1427)    fentanyl 200 mcg/hr (10/11/18 1600)    norepinephrine bitartrate-D5W 1 mcg/kg/min (10/11/18 1600)    sodium bicarbonate drip 200 mL/hr at 10/11/18 1600    vasopressin (PITRESSIN) infusion 0.04 Units/min (10/11/18 1600)     Scheduled Meds:   ceFEPime (MAXIPIME) IVPB  2 g Intravenous Q12H    etomidate  8 mg Intravenous Once    sodium chloride 0.9%  3 mL Intravenous Q8H    succinylcholine  160 mg Intravenous Once     PRN Meds:sodium chloride, dextrose 50%, [] fentaNYL **FOLLOWED BY** fentaNYL, glucagon (human recombinant)    Family History     None        Tobacco Use    Smoking status: Current Every Day Smoker     Packs/day: 1.00     Years: 40.00     Pack years: 40.00     Types: Cigarettes    Smokeless tobacco: Never Used   Substance and Sexual Activity    Alcohol use: Yes     Comment: RARELY    Drug use: Yes     Types: Cocaine     Comment: HAVEN'T USED IN 2 WEEKS    Sexual activity: Not on file       Review of Systems   Unable to perform ROS: Intubated     Objective:     Vital Signs (Most Recent):  Temp: 98.9 °F (37.2 °C) (10/11/18 1500)  Pulse: 73 (10/11/18 1600)  Resp: 20 (10/11/18 1600)  BP: 122/63 (10/09/18 1516)  SpO2: (!) 90 % (10/11/18 1600) Vital Signs (24h Range):  Temp:  [97.7 °F (36.5 °C)-100 °F (37.8 °C)] 98.9 °F (37.2 °C)  Pulse:  [66-82] 73  Resp:  [0-32] 20  SpO2:  [84 %-99 %] 90 %  Arterial Line BP:  ()/(52-70) 107/55     Weight: 90.2 kg (198 lb 13.7 oz)  Body mass index is 27.73 kg/m².    Review of Symptoms  Symptom Assessment (ESAS 0-10 scale)   ESAS 0 1 2 3 4 5 6 7 8 9 10   Pain              Dyspnea              Anxiety              Nausea              Depression               Anorexia              Fatigue              Insomnia              Restlessness               Agitation              CAM / Delirium __ --  ___+   Constipation     __ --  ___+   Diarrhea           __ --  ___+  Bowel Management Plan (BMP): No    Comments: intubated unable to assess symptoms. Appears comfortable no facial grimacing or moaning     Pain Assessment: unable to assess   OME in 24 hours: 1440     Performance Status: 10    ECOG Performance Status Grade: 4 - Completely disabled    Physical Exam   Constitutional: No distress.   Cardiovascular:   Regular irregular rate on monitor.    Pulmonary/Chest:   Intubated    Neurological:   Does not respond to tactile or verbal stimuli    Skin: Skin is warm and dry.       Significant Labs: All pertinent labs within the past 24 hours have been reviewed.  CBC:   Recent Labs   Lab  10/11/18   0306   WBC  31.69*   HGB  7.9*   HCT  22.6*   MCV  90   PLT  23*     BMP:  Recent Labs   Lab  10/11/18   0306   10/11/18   1104   GLU  124*   < >  185*   NA  122*   < >  122*   K  6.2*   < >  5.7*   CL  91*   < >  88*   CO2  15*   < >  21*   BUN  80*   < >  86*   CREATININE  4.4*   < >  4.8*   CALCIUM  6.0*   < >  5.7*   MG  2.3   --    --     < > = values in this interval not displayed.     LFT:  Lab Results   Component Value Date    AST 3,835 (H) 10/11/2018    ALKPHOS 110 10/11/2018    BILITOT 3.0 (H) 10/11/2018     Albumin:   Albumin   Date Value Ref Range Status   10/11/2018 2.0 (L) 3.5 - 5.2 g/dL Final     Protein:   Total Protein   Date Value Ref Range Status   10/11/2018 5.5 (L) 6.0 - 8.4 g/dL Final     Lactic acid:   Lab Results   Component Value Date    LACTATE 2.2 10/11/2018    LACTATE 1.8  10/11/2018       Significant Imaging: I have reviewed all pertinent imaging results/findings within the past 24 hours.    Advanced Directives::  Living Will: No  LaPOST: No  Do Not Resuscitate Status: No  Medical Power of : No, Legal next of kin is wife     Decision-Making Capacity: Family answered questions    Living Arrangements: Lives with spouse    Psychosocial/Cultural:  42 yr, two adult sons, 4 grandchildren       Spiritual:     F- Sunshine and Belief: Holiness   I - Importance: more spiritual than Spiritism  .  C - Community:     A - Address in Care:  following       > 50% of 70  min visit spent in chart review, face to face discussion of goals of care,  symptom assessment, coordination of care and emotional support.    BLANQUITA Begum, ACNS-BC, OCN   Palliative Medicine  [facility]

## 2018-10-11 NOTE — SUBJECTIVE & OBJECTIVE
Interval History/Significant Events: Ongoing discussion with patient's family regarding transitioning to comfort care. Noted oliguric, currently anuric with urine approximately 10-15 ccs/hr. Overall, hyperkalemia, being shifted with nephrology consulted in case family would like to pursue dialysis.  Family discussion regarding prognosis at this time, family to further discuss.    Review of Systems   Unable to perform ROS: Intubated     Objective:     Vital Signs (Most Recent):  Temp: 97.7 °F (36.5 °C) (10/11/18 1100)  Pulse: 68 (10/11/18 1300)  Resp: 20 (10/11/18 1300)  BP: 122/63 (10/09/18 1516)  SpO2: (!) 92 % (10/11/18 1300) Vital Signs (24h Range):  Temp:  [97.7 °F (36.5 °C)-100 °F (37.8 °C)] 97.7 °F (36.5 °C)  Pulse:  [54-82] 68  Resp:  [0-32] 20  SpO2:  [84 %-99 %] 92 %  Arterial Line BP: ()/(52-70) 117/58   Weight: 90.2 kg (198 lb 13.7 oz)  Body mass index is 27.73 kg/m².      Intake/Output Summary (Last 24 hours) at 10/11/2018 1334  Last data filed at 10/11/2018 1300  Gross per 24 hour   Intake 7469.21 ml   Output 515 ml   Net 6954.21 ml       Physical Exam   Constitutional: He appears well-developed. No distress.   Intubated, sedated    HENT:   Head: Normocephalic and atraumatic.   Mouth/Throat: No oropharyngeal exudate.   intubated   Eyes: Pupils are equal, round, and reactive to light. No scleral icterus.   Neck: Normal range of motion.   Cardiovascular: Normal rate, regular rhythm and normal heart sounds.   No murmur heard.  Pulmonary/Chest: Effort normal and breath sounds normal. No stridor. No respiratory distress.   Abdominal: Soft. Bowel sounds are normal. He exhibits no distension. There is no tenderness.   Musculoskeletal: Normal range of motion. He exhibits no edema.   Neurological:   Intubated, sedated    Skin: Skin is warm and dry. No erythema.   Vitals reviewed.      Vents:  Vent Mode: A/C (10/11/18 1217)  Set Rate: 20 bmp (10/11/18 1217)  Vt Set: 450 mL (10/11/18 1217)  Pressure Support:  0 cmH20 (10/11/18 1217)  PEEP/CPAP: 6 cmH20 (10/11/18 1217)  Oxygen Concentration (%): 40 (10/11/18 1300)  Peak Airway Pressure: 24 cmH2O (10/11/18 1217)  Plateau Pressure: 17 cmH20 (10/11/18 1217)  Total Ve: 9.47 mL (10/11/18 1217)  F/VT Ratio<105 (RSBI): (!) 42.37 (10/11/18 1217)  Lines/Drains/Airways     Central Venous Catheter Line                 Percutaneous Central Line Insertion/Assessment - triple lumen  10/09/18 0922 right internal jugular 2 days          Drain                 Urethral Catheter 10/09/18 0912 Double-lumen;Latex 16 Fr. 2 days         NG/OG Tube 10/09/18 1450 Latimer sump 1 day          Airway                 Airway - Non-Surgical 10/09/18 1510 Endotracheal Tube 1 day          Arterial Line                 Arterial Line 10/09/18 1005 Left Radial 2 days          Peripheral Intravenous Line                 Peripheral IV - Single Lumen 10/09/18 0714 Right Wrist 2 days         Peripheral IV - Single Lumen 10/09/18 0720 Left Antecubital 2 days         Peripheral IV - Single Lumen 10/09/18 0912 Left Hand 2 days              Significant Labs:    CBC/Anemia Profile:  Recent Labs   Lab  10/10/18   1253  10/10/18   1740  10/11/18   0306   WBC  34.77*  21.03*  31.69*   HGB  6.8*  6.0*  7.9*   HCT  21.2*  18.3*  22.6*   PLT  34*  26*  23*   MCV  96  95  90   RDW  19.6*  19.3*  18.2*        Chemistries:  Recent Labs   Lab  10/09/18   1504   10/10/18   0506  10/10/18   1253  10/10/18   1740  10/11/18   0306  10/11/18   0823  10/11/18   1104   NA  129*   < >  124*  121*  122*  122*  122*  121*  122*   K  4.5   < >  6.2*  6.6*  5.3*  5.3*  6.2*  5.6*  5.7*   CL  97   < >  95  93*  91*  91*  91*  87*  88*   CO2  15*   < >  13*  15*  15*  15*  15*  20*  21*   BUN  45*   < >  56*  65*  70*  70*  80*  84*  86*   CREATININE  2.4*   < >  2.8*  3.4*  3.8*  3.8*  4.4*  4.6*  4.8*   CALCIUM  7.1*   < >  6.4*  6.3*  6.0*  6.0*  6.0*  5.7*  5.7*   ALBUMIN  2.3*   < >  2.1*  2.0*  1.9*  2.0*   --    --    PROT   5.7*   < >  5.7*  5.4*  5.0*  5.5*   --    --    BILITOT  1.4*   < >  2.7*  3.0*  2.8*  3.0*   --    --    ALKPHOS  79   < >  90  94  91  110   --    --    ALT  86*   < >  240*  674*  1,133*  2,148*   --    --    AST  70*   < >  276*  1,071*  1,988*  3,835*   --    --    MG  1.6   < >  2.4  2.4  2.4  2.3   --    --    PHOS  7.6*   --   9.2*   --    --    --    --    --     < > = values in this interval not displayed.       Significant Imaging:  I have reviewed all pertinent imaging results/findings within the past 24 hours.

## 2018-10-11 NOTE — PROGRESS NOTES
[facility]  Critical Care Medicine  Progress Note    Patient Name: Kevin Crews  MRN: [id number]  Admission Date: 10/9/2018  Hospital Length of Stay: 2 days  Code Status: DNR  Attending Provider: Ross Lara*  Primary Care Provider: Juju Banda MD   Principal Problem: Septic shock    Subjective:     HPI:  Kevin Crews is a 62 y.o. gentleman with recently diagnosed AML (FLT3 positive), COPD on home O2 (no PFTs on file), active hepatitis B on treatment, CAD s/p PCI x 2 (2008), essential HTN, history of prostate CA s/p resection, history of TIA, and HLD who presents to [facility] for altered mental status and hypotension.  History is provided primarily by his wife who is at bedside.  She states he was usual state of health until yesterday, where he was more lethargic.  He went to see his cardiologist who noted that he had low blood pressures with SBP in the 70s,  But rechecked at 110/60.  At that time, he had labs drawn who also noted anemia and thrombocytopenia, where he was going to get blood and platelets, but he was only able to receive 1 unit of platelets.  He was instructed to drink more water and follow up at a later time, which he tried to do when he went home.  However, he noted continued lethargy and started to become more confused along with new onset fever of 101 taken at home, which prompted his family to bring him to [facility] ED for further evaluation.  Here, he was noted to be hypotensive with SBP in the 60s, where he was given 30 ccs/kg of crystalloids.  Due to persistent hypotension, he was then started on peripheral levophed.  His antibiotic coverage initially consisted of vancomycin and cefepime.  Critical Care was consulted due to hypotension concerning for septic shock.      Recently diagnosed with AML after noted to have concerning blood after a blood donation.  Follows with [facility] hematology, where he was ruled not a candidate for 10 day Decitabine therapy due to  significant dental caries, but was opted for Sorafenib + Vidaza.  Patient has not taken medication yet due to insurance approval.  He has been on antibiotics for removal of dental caries, of which he was currently on augmentin.  Recently admitted to [facility] for angioedema, where lisinopril was discontinued.  Noted to have CAD with PCI x 2 in 2008 in [place].  Remote history of Prostate CA s/p resection.  Wife notes significant weight loss since he had moved to [place] early in 2018, but denies any appetite changes or nightsweats.  Does note patient has a chronic cough with mild productive sputum, no significant change from baseline.      Interval History/Significant Events: Ongoing discussion with patient's family regarding transitioning to comfort care. Noted oliguric, currently anuric with urine approximately 10-15 ccs/hr. Overall, hyperkalemia, being shifted with nephrology consulted in case family would like to pursue dialysis.  Family discussion regarding prognosis at this time, family to further discuss.    Review of Systems   Unable to perform ROS: Intubated     Objective:     Vital Signs (Most Recent):  Temp: 97.7 °F (36.5 °C) (10/11/18 1100)  Pulse: 68 (10/11/18 1300)  Resp: 20 (10/11/18 1300)  BP: 122/63 (10/09/18 1516)  SpO2: (!) 92 % (10/11/18 1300) Vital Signs (24h Range):  Temp:  [97.7 °F (36.5 °C)-100 °F (37.8 °C)] 97.7 °F (36.5 °C)  Pulse:  [54-82] 68  Resp:  [0-32] 20  SpO2:  [84 %-99 %] 92 %  Arterial Line BP: ()/(52-70) 117/58   Weight: 90.2 kg (198 lb 13.7 oz)  Body mass index is 27.73 kg/m².      Intake/Output Summary (Last 24 hours) at 10/11/2018 1334  Last data filed at 10/11/2018 1300  Gross per 24 hour   Intake 7469.21 ml   Output 515 ml   Net 6954.21 ml       Physical Exam   Constitutional: He appears well-developed. No distress.   Intubated, sedated    HENT:   Head: Normocephalic and atraumatic.   Mouth/Throat: No oropharyngeal exudate.   intubated   Eyes: Pupils are equal, round,  and reactive to light. No scleral icterus.   Neck: Normal range of motion.   Cardiovascular: Normal rate, regular rhythm and normal heart sounds.   No murmur heard.  Pulmonary/Chest: Effort normal and breath sounds normal. No stridor. No respiratory distress.   Abdominal: Soft. Bowel sounds are normal. He exhibits no distension. There is no tenderness.   Musculoskeletal: Normal range of motion. He exhibits no edema.   Neurological:   Intubated, sedated    Skin: Skin is warm and dry. No erythema.   Vitals reviewed.      Vents:  Vent Mode: A/C (10/11/18 1217)  Set Rate: 20 bmp (10/11/18 1217)  Vt Set: 450 mL (10/11/18 1217)  Pressure Support: 0 cmH20 (10/11/18 1217)  PEEP/CPAP: 6 cmH20 (10/11/18 1217)  Oxygen Concentration (%): 40 (10/11/18 1300)  Peak Airway Pressure: 24 cmH2O (10/11/18 1217)  Plateau Pressure: 17 cmH20 (10/11/18 1217)  Total Ve: 9.47 mL (10/11/18 1217)  F/VT Ratio<105 (RSBI): (!) 42.37 (10/11/18 1217)  Lines/Drains/Airways     Central Venous Catheter Line                 Percutaneous Central Line Insertion/Assessment - triple lumen  10/09/18 0922 right internal jugular 2 days          Drain                 Urethral Catheter 10/09/18 0912 Double-lumen;Latex 16 Fr. 2 days         NG/OG Tube 10/09/18 1450 [place] sump 1 day          Airway                 Airway - Non-Surgical 10/09/18 1510 Endotracheal Tube 1 day          Arterial Line                 Arterial Line 10/09/18 1005 Left Radial 2 days          Peripheral Intravenous Line                 Peripheral IV - Single Lumen 10/09/18 0714 Right Wrist 2 days         Peripheral IV - Single Lumen 10/09/18 0720 Left Antecubital 2 days         Peripheral IV - Single Lumen 10/09/18 0912 Left Hand 2 days              Significant Labs:    CBC/Anemia Profile:  Recent Labs   Lab  10/10/18   1253  10/10/18   1740  10/11/18   0306   WBC  34.77*  21.03*  31.69*   HGB  6.8*  6.0*  7.9*   HCT  21.2*  18.3*  22.6*   PLT  34*  26*  23*   MCV  96  95  90   RDW  19.6*   19.3*  18.2*        Chemistries:  Recent Labs   Lab  10/09/18   1504   10/10/18   0506  10/10/18   1253  10/10/18   1740  10/11/18   0306  10/11/18   0823  10/11/18   1104   NA  129*   < >  124*  121*  122*  122*  122*  121*  122*   K  4.5   < >  6.2*  6.6*  5.3*  5.3*  6.2*  5.6*  5.7*   CL  97   < >  95  93*  91*  91*  91*  87*  88*   CO2  15*   < >  13*  15*  15*  15*  15*  20*  21*   BUN  45*   < >  56*  65*  70*  70*  80*  84*  86*   CREATININE  2.4*   < >  2.8*  3.4*  3.8*  3.8*  4.4*  4.6*  4.8*   CALCIUM  7.1*   < >  6.4*  6.3*  6.0*  6.0*  6.0*  5.7*  5.7*   ALBUMIN  2.3*   < >  2.1*  2.0*  1.9*  2.0*   --    --    PROT  5.7*   < >  5.7*  5.4*  5.0*  5.5*   --    --    BILITOT  1.4*   < >  2.7*  3.0*  2.8*  3.0*   --    --    ALKPHOS  79   < >  90  94  91  110   --    --    ALT  86*   < >  240*  674*  1,133*  2,148*   --    --    AST  70*   < >  276*  1,071*  1,988*  3,835*   --    --    MG  1.6   < >  2.4  2.4  2.4  2.3   --    --    PHOS  7.6*   --   9.2*   --    --    --    --    --     < > = values in this interval not displayed.       Significant Imaging:  I have reviewed all pertinent imaging results/findings within the past 24 hours.    Assessment/Plan:     Neuro   Acute metabolic encephalopathy    - noted encephalopathy on presentation  - septic encephalopathy, on sedation, can assess when patient is off significant vasopressor support         History of CVA (cerebrovascular accident)    - hold statin for now         ENT   Poor dentition    - patient with noted poor dentition with numerous dental caries, overall possible source for sepsis  - CT head/neck with missing teeth and minimal bone  oral cavity from maxillary sinuses, but no convincing evidence of an abscess or significant tissue inflammation  - overall, continue broad spectrum antibiotics at this time         Pulmonary   COPD (chronic obstructive pulmonary disease)    - intubated given septic shock, not on significant  oxygen requirements   - goal sat 88 - 92  - duo nebs q 4   - currently sedated, can assess SBT if patient has clinical improvement         Cardiac/Vascular   Other hyperlipidemia    - hold home statin for now given mental status, can restart once improved          Coronary artery disease involving native coronary artery of native heart without angina pectoris    - CAD s/p PCI x 2 in 2008  - no anginal symptoms previously, troponin negative on first draw, suspect hypotension unrelated   - however, 2D echo with new inferolateral wall hypokinesis as well as global hypokinesis, possibly related to sepsis  - will continue to assess, hold home beta blocker at this time         Essential hypertension    - on home norvasc 10 mg PO daily and coreg 12.5 mg PO daily  - hold in setting of septic shock         Renal/   Lactic acidosis    - still with lactic acidosis, likely from underlying septic shock         RUDI (acute kidney injury)    - Cr increasing, oliguric to anuric at this time  - high concern for iATN given degree of sepsis  - discussed need for renal replacement therapy at this time, despite underlying and untreatable AML  - overall, actively shifting K and correcting underlying acidosis with bicarb gtt, insulin + D50, calcium gluconate for cardiac membrane stabilization  - continue q 6 labs at this time  - overall, will continue family discussions at this time regarding goals of care and further prognosis        ID   * Septic shock    - s/p 30 ccs/kg IV crystalloid IV resuscitation, weaned off epinephrine, currently on max dose levophed and vasopressors   - gram negative bacteremia, following up on cultures  - serratia grown in sputum, unknown significance at this time  - overall, continue broad spectrum antibiotics, titrate down vasopressors         Immunology/Multi System   Hepatitis B core antibody positive    - recently started on hepatitis B treatment  - continue to hold for now pending improvement of mental  status         Oncology   Acute blood loss anemia    - noted anemia of 5.8 on admission, 6 day prior  - likely due to hypoproliferation from underlying AML  - will need definitive treatment of AML for further improvement  - Transfuse for hemoglobin < 7        Acute myeloid leukemia not having achieved remission    - recent diagnosis of non M3 AML, FLT3 positive with normal cytogenetics  - BMT consulted, noted progressive disease, unlikely treatable  - will continue discussion with family at this time         GI   Transaminitis    - persistently elevated transaminases in the 50s/60s  - likely related to underlying chronic hepatitis B, otherwise no worsening given episode of hypotension  - continue to trend at this time, can treat hepatitis B at later time once clinically improved            Critical Care Daily Checklist:    A: Awake: RASS Goal/Actual Goal: RASS Goal: -5-->unarousable  Actual: Perkins Agitation Sedation Scale (RASS): Unarousable   B: Spontaneous Breathing Trial Performed?     C: SAT & SBT Coordinated?  none                      D: Delirium: CAM-ICU Overall CAM-ICU: Positive   E: Early Mobility Performed? No   F: Feeding Goal: Goals: Meet % EEN, EPN  Status: Nutrition Goal Status: new   Current Diet Order   Procedures    Diet NPO      AS: Analgesia/Sedation Fentanyl gtt   T: Thromboembolic Prophylaxis None   H: HOB > 300 Yes   U: Stress Ulcer Prophylaxis (if needed) Protonix 40mg IV daily   G: Glucose Control Monitor   B: Bowel Function     I: Indwelling Catheter (Lines & Pineda) Necessity ET tube, PIV x 3, arterial line, pineda catheter, R IJ central line   D: De-escalation of Antimicrobials/Pharmacotherapies Cefepime    Plan for the day/ETD Discussion with family    Code Status:  Family/Goals of Care: DNR         Critical secondary to Patient has a condition that poses threat to life and bodily function: Septic shock      Critical care was time spent personally by me on the following  activities: development of treatment plan with patient or surrogate and bedside caregivers, discussions with consultants, evaluation of patient's response to treatment, examination of patient, ordering and performing treatments and interventions, ordering and review of laboratory studies, ordering and review of radiographic studies, pulse oximetry, re-evaluation of patient's condition. This critical care time did not overlap with that of any other provider or involve time for any procedures.     Priyanka Odom MD PGY1  Critical Care Medicine  [facility]

## 2018-10-11 NOTE — CONSULTS
[facility]  Palliative Medicine  Consult Note    Patient Name: Kevin Crews  MRN: [id number]  Admission Date: 10/9/2018  Hospital Length of Stay: 2 days  Code Status: DNR   Attending Provider: Ross Lara*  Consulting Provider: ELIDA Temple  Primary Care Physician: Juju Banda MD  Principal Problem:Septic shock    .      Inpatient consult to Palliative Care  Consult performed by: ELIDA Mccray  Consult ordered by: Ross Lara MD  Reason for consult: comfort care   Assessment/Recommendations: Palliative care consult received.   Order written per RN with no MD signature. Chart reviewed.  Palliative care APRN has attempted to discuss with CCS Fellow, Awaiting return call.  Full consult pending.   Thank you for consult and opportunity to participate in Mr. Crews's care.     BLANQUITA Begum, ACNS-BC, OCN   .

## 2018-10-11 NOTE — ASSESSMENT & PLAN NOTE
- Cr increasing, oliguric to anuric at this time  - high concern for iATN given degree of sepsis  - discussed need for renal replacement therapy at this time, despite underlying and untreatable AML  - overall, actively shifting K and correcting underlying acidosis with bicarb gtt, insulin + D50, calcium gluconate for cardiac membrane stabilization  - continue q 6 labs at this time  - overall, will continue family discussions at this time regarding goals of care and further prognosis

## 2018-10-11 NOTE — TELEPHONE ENCOUNTER
DOCUMENTATION ONLY:  Appeal for Nexavar approved from 10/10/2018 to 10/10/2019   Case ID: [id number]   Co-pay: $3.00  Patient Assistance IS NOT required  Quantity Approved #120/30  CHARU

## 2018-10-11 NOTE — HPI
Mr. Crews is a 62 y.o. gentleman with recently diagnosed AML (FLT3 positive), COPD on home O2 (no PFTs on file), active hepatitis B on treatment, CAD s/p PCI x 2 (2008), essential HTN, history of prostate CA s/p resection, history of TIA, and HLD who presents to [facility] for altered mental status and hypotension.    As per chart review HPI includes    Septic Shock - broad Abx, follow cultures, pan CT scan. Concern for lung or oral source.   Acute metabolic encephalopathy - multifactorial   AML - BMT consult. Per report pt has a dismal prognosis from this in itself   Acute kidney injury - monitor   Anemia/Thrombocytopenia      Cardiac arrest - PEA arrest while moving from one bed to another. Subsequently has needed max dose pressors.  Goals of care - no CPR is appropriate given severity of disease and AML    As per primary team, family was in agreement with withdrawal of life support and then changed their decision.  After  CCS spoke with family today, they are now in agreement to proceed with withdrawal of life support.   Palliative care consulted for comfort care.

## 2018-10-11 NOTE — ASSESSMENT & PLAN NOTE
Palliative care consulted for comfort care.  Palliative care APRN met with patient, family and Dr. Chavez, CCS fellow at bedside.  Mr. Crews unable to participate in conversation.  Palliative care introduced to family.     Impression: Mr. Crews is 61 yo gentleman.  He is intubated, unresponsive to verbal and tactile stimulation. Unable to follow commands,  Receiving pressor support with levophed and is on continuous fentanyl drip.  Appears comfortable with no facial grimacing or moaning.  Family is amenable to withdrawal of life support - to proceed tonight.     Goals of Care:  - No advanced directives on file. Wife is next of kin for medical decisions   - Wife and sons with complete understanding of current clinical condition.  Family's request is Mr. Crews will not suffer  - DNR per primary team.   - Withdrawal of life support symptom management:   Critical care to utilize palliative care withdrawal of life support order set.  Pain: currently receiving fentanyl drip at 200 mcg per hour, would be beneficial to continue with fentanyl drip at 200 mcg per hour and have fentanyl 25  mcg IVP every 10 mins as needed for pain   Anxiety  Lorazepam 1 mg IVP every 30 mins as needed for anxiety  Secretions  Glycopyrrolate 0.2 mg IVP three times daily as needed for secretions. Please give dose prior to extubation.     Plan:   - Critical care to utilize withdrawal of life support order set, see above symptom management recommendations   - Please contact  prior to extubation   - If patient survives withdrawal of life support and remains stable for transport, consider inpatient hospice consult.   - Emotional support given.   - Bereavement tray.         Thank you for consult and opportunity to participate in Mr. Crews's care.

## 2018-10-11 NOTE — PLAN OF CARE
Problem: Patient Care Overview  Goal: Plan of Care Review  Outcome: Ongoing (interventions implemented as appropriate)  Plan of care reviewed with family; pt now on comfort measures

## 2018-10-11 NOTE — PLAN OF CARE
Problem: Patient Care Overview  Goal: Plan of Care Review  Pt. Remains intubated/sedated. VSS. Remains on Levo, vaso, Bicarb, fentanyl and precedex gtts. uop 15-20 ml/hr. Critical and abnormal lab values called to Dr. Edwards ([facility]). Pt.'s family decided to stopped comfort care measurements and continue with treatment. Frequent vitals and assessment per flowsheet, plan of care reviewed with Mr. Crews and his spouse, questions/concerns addressed, will continue to monitor pt.

## 2018-10-11 NOTE — NURSING
MD Novoa at bedside-extensive discussion with family who are all in agreement for pursing comfort care at this time; consult placed to palliative care and  notified

## 2018-10-11 NOTE — SUBJECTIVE & OBJECTIVE
Interval History:     Past Medical History:   Diagnosis Date    COPD (chronic obstructive pulmonary disease)     Heart attack     Hypertension     Leukemia     AML    Prostate cancer     Stroke        Past Surgical History:   Procedure Laterality Date    CORONARY ANGIOPLASTY WITH STENT PLACEMENT      EYE SURGERY      PROSTATE SURGERY         Review of patient's allergies indicates:   Allergen Reactions    Lisinopril Swelling     Angioedema    Effient [prasugrel] Rash       Medications:  Continuous Infusions:   dexmedetomidine (PRECEDEX) infusion 0.2 mcg/kg/hr (10/11/18 1600)    epinephrine infusion Stopped (10/10/18 1427)    fentanyl 200 mcg/hr (10/11/18 1600)    norepinephrine bitartrate-D5W 1 mcg/kg/min (10/11/18 1600)    sodium bicarbonate drip 200 mL/hr at 10/11/18 1600    vasopressin (PITRESSIN) infusion 0.04 Units/min (10/11/18 1600)     Scheduled Meds:   ceFEPime (MAXIPIME) IVPB  2 g Intravenous Q12H    etomidate  8 mg Intravenous Once    sodium chloride 0.9%  3 mL Intravenous Q8H    succinylcholine  160 mg Intravenous Once     PRN Meds:sodium chloride, dextrose 50%, [] fentaNYL **FOLLOWED BY** fentaNYL, glucagon (human recombinant)    Family History     None        Tobacco Use    Smoking status: Current Every Day Smoker     Packs/day: 1.00     Years: 40.00     Pack years: 40.00     Types: Cigarettes    Smokeless tobacco: Never Used   Substance and Sexual Activity    Alcohol use: Yes     Comment: RARELY    Drug use: Yes     Types: Cocaine     Comment: HAVEN'T USED IN 2 WEEKS    Sexual activity: Not on file       Review of Systems   Unable to perform ROS: Intubated     Objective:     Vital Signs (Most Recent):  Temp: 98.9 °F (37.2 °C) (10/11/18 1500)  Pulse: 73 (10/11/18 1600)  Resp: 20 (10/11/18 1600)  BP: 122/63 (10/09/18 1516)  SpO2: (!) 90 % (10/11/18 1600) Vital Signs (24h Range):  Temp:  [97.7 °F (36.5 °C)-100 °F (37.8 °C)] 98.9 °F (37.2 °C)  Pulse:  [66-82] 73  Resp:   [0-32] 20  SpO2:  [84 %-99 %] 90 %  Arterial Line BP: ()/(52-70) 107/55     Weight: 90.2 kg (198 lb 13.7 oz)  Body mass index is 27.73 kg/m².    Review of Symptoms  Symptom Assessment (ESAS 0-10 scale)   ESAS 0 1 2 3 4 5 6 7 8 9 10   Pain              Dyspnea              Anxiety              Nausea              Depression               Anorexia              Fatigue              Insomnia              Restlessness               Agitation              CAM / Delirium __ --  ___+   Constipation     __ --  ___+   Diarrhea           __ --  ___+  Bowel Management Plan (BMP): No    Comments: intubated unable to assess symptoms. Appears comfortable no facial grimacing or moaning     Pain Assessment: unable to assess   OME in 24 hours: 1440     Performance Status: 10    ECOG Performance Status Grade: 4 - Completely disabled    Physical Exam   Constitutional: No distress.   Cardiovascular:   Regular irregular rate on monitor.    Pulmonary/Chest:   Intubated    Neurological:   Does not respond to tactile or verbal stimuli    Skin: Skin is warm and dry.       Significant Labs: All pertinent labs within the past 24 hours have been reviewed.  CBC:   Recent Labs   Lab  10/11/18   0306   WBC  31.69*   HGB  7.9*   HCT  22.6*   MCV  90   PLT  23*     BMP:  Recent Labs   Lab  10/11/18   0306   10/11/18   1104   GLU  124*   < >  185*   NA  122*   < >  122*   K  6.2*   < >  5.7*   CL  91*   < >  88*   CO2  15*   < >  21*   BUN  80*   < >  86*   CREATININE  4.4*   < >  4.8*   CALCIUM  6.0*   < >  5.7*   MG  2.3   --    --     < > = values in this interval not displayed.     LFT:  Lab Results   Component Value Date    AST 3,835 (H) 10/11/2018    ALKPHOS 110 10/11/2018    BILITOT 3.0 (H) 10/11/2018     Albumin:   Albumin   Date Value Ref Range Status   10/11/2018 2.0 (L) 3.5 - 5.2 g/dL Final     Protein:   Total Protein   Date Value Ref Range Status   10/11/2018 5.5 (L) 6.0 - 8.4 g/dL Final     Lactic acid:   Lab Results   Component  Value Date    LACTATE 2.2 10/11/2018    LACTATE 1.8 10/11/2018       Significant Imaging: I have reviewed all pertinent imaging results/findings within the past 24 hours.    Advanced Directives::  Living Will: No  LaPOST: No  Do Not Resuscitate Status: No  Medical Power of : No, Legal next of kin is wife     Decision-Making Capacity: Family answered questions    Living Arrangements: Lives with spouse    Psychosocial/Cultural:  42 yr, two adult sons, 4 grandchildren       Spiritual:     F- [place]shine and Belief: Buddhist   I - Importance: more spiritual than Church  .  C - Community:     A - Address in Care:  following

## 2018-10-11 NOTE — SIGNIFICANT EVENT
Ongoing discussions with family regarding goals of care. CCS Fellow Dr. Novoa discussed transitioning to comfort measures and turning off vasopressor support earlier this evening. At time of that discussion, family was agreeable with transitioning to comfort care. At the time that I went to the bedside to reconfirm the plan with the family, the patient's wife and two sons had changed their minds about transitioning to comfort measures. At this time they are requesting re-evaluation for potential initiation of dialysis and obtaining a second opinion regarding tx for pt's AML at [facility].     Will continue medical management at this time given family's decision. Will resume abx and lab draws. Will defer discussion of offering HD until tomorrow, however will shift potassium as needed overnight.    Cleopatra Loredo NP  Critical Care Medicine

## 2018-10-12 PROBLEM — K08.9 POOR DENTITION: Status: RESOLVED | Noted: 2018-01-01 | Resolved: 2018-10-12

## 2018-10-12 PROBLEM — E78.49 OTHER HYPERLIPIDEMIA: Status: RESOLVED | Noted: 2018-01-01 | Resolved: 2018-10-12

## 2018-10-12 LAB
BACTERIA BLD CULT: NORMAL

## 2018-10-12 NOTE — ASSESSMENT & PLAN NOTE
Untreated AML, FLT3+ presenting with septic shock. Initial assessment was prior to the patient coding.  Following the code, he was intubated and on 3 pressors.  Septic shock secondary to pseudomonas bacteremia.  Prognosis poor.  Met with family 10/10/18 to get a sense of their understanding and to explain the patient's current situation.  Overall told them that he was not a candidate for chemotherapy as the risks outweighed the benefits.  See clinical review note 10/10/18.    -Met with family today with Dr. Lew.  Spent at least 30 minutes with family answering questions and providing emotional support.  At the end of the meeting, family made a decision to make him comfort care.  -Spoke to ICU team regarding discussion.  -Available at any time to visit again with family for any other questions.

## 2018-10-12 NOTE — SIGNIFICANT EVENT
Patient was noted on Asystole. CCS was made aware. MD went to bedside and pronounced time of death (). Post mortem care rendered. Nursing Supervisor and SALVADOR notified. Personal belongings obtained by family.  services arranged by family. Transport team notified. Will send patient's body to [facility].

## 2018-10-12 NOTE — HOSPITAL COURSE
When transferring beds patient when into cardiac arrest, ROSC achieved after CPR. Intubated, on vasopressors.  He was admitted to ICU service, continued on broad spectrum antibiotics (with GNR later growing in blood), 3 pressors for BP support, intubated and on CRRT.   Patient's pressors were titrated but he never significantly improved after transfer, continue to require significant support with progressive organ injury and failure.   Patient required multiple blood transfusions, became anuric, required ventilatory support, was encephalopathic and was not adequately perfusing without high dose vasopressors.   Initially on 10/10 patient was transitioned to a comfort based goal but this was reversed later in the evening with a more aggressive approach and electrolytes, blood counts and blood pressure support all intervened on.   On 10/11 after further discussion with family about patient's grave prognosis from both his acute septic shock from GNR bacteremia, s/p cardiac arrest, multi-organ failure on extensive life support and concerning underlying malignancy the decision was made to transition to comfort measures. Patient provided with symptom management and he  on the evening of 10/11 at 19:07.

## 2018-10-12 NOTE — DISCHARGE SUMMARY
[facility]  Critical Care Medicine  Discharge Summary      Patient Name: Kevin Crews  MRN: [id number]  Admission Date: 10/9/2018  Hospital Length of Stay: 2 days  Discharge Date and Time:  10/11 19:07    Attending Physician: Ross Lara MD  Discharging Provider: Ross Lara MD  Primary Care Provider: Juju Banda MD  Reason for Admission: Septic shock    HPI:   Kevin Crews is a 62 y.o. gentleman with recently diagnosed AML (FLT3 positive), COPD on home O2 (no PFTs on file), active hepatitis B on treatment, CAD s/p PCI x 2 (2008), essential HTN, history of prostate CA s/p resection, history of TIA, and HLD who [place] [facility] [place] and [place].  History is provided primarily by his wife who is at bedside.  She states he was usual state of health until yesterday, where he was more lethargic.  He went to see his cardiologist who noted that he had low blood pressures with SBP in the 70s,  But rechecked at 110/60.  At that time, he had labs drawn with anemia and thrombocytopenia, received 1 unit of platelets.  He was instructed to drink more water and follow up at a later time, which he tried to do when he went home.  However, he noted continued lethargy and started to become more confused along with new onset fever of 101 taken at home, which prompted his family to bring him [place] [facility] [place].  Here, he was noted to be hypotensive with SBP in the 60s, where he was given 30 ccs/kg of crystalloids.  Due to persistent hypotension, he was then started on peripheral levophed.  His antibiotic coverage initially consisted of vancomycin and cefepime.  Critical Care was consulted due to hypotension concerning for septic shock.      Recently diagnosed with AML after noted to have concerning blood after a blood donation.  [place]s [place] [facility] [place], where he was ruled not a candidate for 10 day Decitabine therapy due to significant dental  caries, but was opted for Sorafenib + Vidaza.  Patient has not taken medication yet due to insurance approval.  He has been on antibiotics for removal of dental caries, of which he was currently on augmentin.  [place] [facility] [place], where lisinopril was discontinued.  Noted to have CAD with PCI x 2 in 2008 in [place].  Remote history of Prostate CA s/p resection.  Wife notes significant weight loss since he had moved to [place] early in 2018, but denies any appetite changes or nightsweats.  Does note patient has a chronic cough with mild productive sputum, no significant change from baseline.      * No surgery found *    Indwelling Lines/Drains at Time of Discharge:   Lines/Drains/Airways     Central Venous Catheter Line                 Percutaneous Central Line Insertion/Assessment - triple lumen  10/09/18 0922 right internal jugular 2 days          Drain                 NG/OG Tube 10/09/18 1450 [place] sump 2 days         Urethral Catheter 10/09/18 0912 Double-lumen;Latex 16 Fr. 2 days          Airway                 Airway - Non-Surgical 10/09/18 1510 Endotracheal Tube 2 days          Arterial Line                 Arterial Line 10/09/18 1005 Left Radial 2 days              Hospital Course:   When transferring beds patient when into cardiac arrest, ROSC achieved after CPR. Intubated, on vasopressors.  He was admitted to ICU service, continued on broad spectrum antibiotics (with GNR later growing in blood), 3 pressors for BP support, intubated and on CRRT.   Patient's pressors were titrated but he never significantly improved after transfer, continue to require significant support with progressive organ injury and failure.   Patient required multiple blood transfusions, became anuric, required ventilatory support, was encephalopathic and was not adequately perfusing without high dose vasopressors.   Initially on 10/10 patient was transitioned to a comfort based goal but this was reversed later  in the evening with a more aggressive approach and electrolytes, blood counts and blood pressure support all intervened on.   On 10/11 after further discussion with family about patient's grave prognosis from both his acute septic shock from GNR bacteremia, s/p cardiac arrest, multi-organ failure on extensive life support and concerning underlying malignancy the decision was made to transition to comfort measures. Patient provided with symptom management and he  on the evening of 10/11 at 19:07.     Consults (From admission, onward)        Status Ordering Provider     Inpatient consult to Critical Care Medicine  Once     Provider:  (Not yet assigned)    Completed ESTHER YOO     Inpatient consult to Hematology/Oncology  Once     Provider:  (Not yet assigned)    Completed NEAL PANCHAL     Inpatient consult to Palliative Care  Once     Provider:  (Not yet assigned)    Completed CHICO MUHAMMAD     Inpatient consult to Palliative Care  Once     Provider:  (Not yet assigned)    Completed KATHRIN VARGHESE     IP consult to dietary  Once     Provider:  (Not yet assigned)    Completed RADHA BARTH        Significant Labs:  A1C: No results for input(s): HGBA1C in the last 48 hours.  ABGs:   Recent Labs   Lab  10/11/18   0516   PH  7.492*   PCO2  33.7*   HCO3  25.8   POCSATURATED  34*   BE  2     Bilirubin:   Recent Labs   Lab  10/09/18   2329  10/10/18   0506  10/10/18   1253  10/10/18   1740  10/11/18   0306   BILITOT  2.5*  2.7*  3.0*  2.8*  3.0*     Blood Culture:   No results for input(s): LABBLOO in the last 48 hours.  BMP:   Recent Labs   Lab  10/11/18   0306   10/11/18   1104   GLU  124*   < >  185*   NA  122*   < >  122*   K  6.2*   < >  5.7*   CL  91*   < >  88*   CO2  15*   < >  21*   BUN  80*   < >  86*   CREATININE  4.4*   < >  4.8*   CALCIUM  6.0*   < >  5.7*   MG  2.3   --    --     < > = values in this interval not displayed.     CMP:   Recent Labs   Lab  10/11/18   0306  10/11/18   0823   10/11/18   1104   NA  122*  121*  122*   K  6.2*  5.6*  5.7*   CL  91*  87*  88*   CO2  15*  20*  21*   GLU  124*  235*  185*   BUN  80*  84*  86*   CREATININE  4.4*  4.6*  4.8*   CALCIUM  6.0*  5.7*  5.7*   PROT  5.5*   --    --    ALBUMIN  2.0*   --    --    BILITOT  3.0*   --    --    ALKPHOS  110   --    --    AST  3,835*   --    --    ALT  2,148*   --    --    ANIONGAP  16  14  13   EGFRNONAA  13.6*  12.9*  12.2*     Cardiac Markers: No results for input(s): CKMB, TROPONINT, MYOGLOBIN in the last 48 hours.  Coagulation:   Recent Labs   Lab  10/11/18   0306   INR  3.4*     Lactic Acid:   Recent Labs   Lab  10/11/18   0306  10/11/18   0823   LACTATE  1.8  2.2     Lipid Panel: No results for input(s): CHOL, HDL, LDLCALC, TRIG, CHOLHDL in the last 48 hours.  POCT Glucose:   Recent Labs   Lab  10/10/18   2323  10/11/18   0639  10/11/18   1109   POCTGLUCOSE  140*  273*  210*     Respiratory Culture: No results for input(s): GSRESP, RESPIRATORYC in the last 48 hours.  Troponin: No results for input(s): TROPONINI in the last 48 hours.  Urine Culture: No results for input(s): LABURIN in the last 48 hours.  Urine Studies: No results for input(s): COLORU, APPEARANCEUA, PHUR, SPECGRAV, PROTEINUA, GLUCUA, KETONESU, BILIRUBINUA, OCCULTUA, NITRITE, UROBILINOGEN, LEUKOCYTESUR, RBCUA, WBCUA, BACTERIA, SQUAMEPITHEL, HYALINECASTS in the last 48 hours.    Invalid input(s): ZOESUR      Pending Diagnostic Studies:     None        Final Active Diagnoses:    Diagnosis Date Noted POA    PRINCIPAL PROBLEM:  Septic shock [A41.9, R65.21]  Yes    Palliative care encounter [Z51.5] 10/11/2018 Not Applicable    Bacteremia due to Pseudomonas [R78.81] 10/11/2018 Yes    Acute blood loss anemia [D62] 10/09/2018 Yes    Acute metabolic encephalopathy [G93.41] 10/09/2018 Yes    Transaminitis [R74.0] 10/09/2018 Yes    Lactic acidosis [E87.2] 10/09/2018 Yes    Cardiac arrest [I46.9] 10/09/2018 No    Hepatitis B core antibody positive  [R76.8] 2018 Yes    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] 2018 Yes    RUDI (acute kidney injury) [N17.9] 2018 Yes    History of CVA (cerebrovascular accident) [Z86.73] 2018 Not Applicable    COPD (chronic obstructive pulmonary disease) [J44.9] 2018 Yes    Coronary artery disease involving native coronary artery of native heart without angina pectoris [I25.10] 2018 Yes    Essential hypertension [I10] 2018 Yes    Acute hypoxemic respiratory failure [J96.01] 2018 Yes      Problems Resolved During this Admission:    Diagnosis Date Noted Date Resolved POA    Poor dentition [K08.9] 2018 10/12/2018 Yes    Other hyperlipidemia [E78.49] 2018 10/12/2018 Yes     No new Assessment & Plan notes have been filed under this hospital service since the last note was generated.  Service: Critical Care Medicine    Discharged Condition:     Disposition:       Patient Instructions:   No discharge procedures on file.  Medications:  Reconciled Home Medications:      Medication List      STOP taking these medications    acyclovir 400 MG tablet  Commonly known as:  ZOVIRAX     albuterol 90 mcg/actuation inhaler  Commonly known as:  PROVENTIL HFA     amLODIPine 10 MG tablet  Commonly known as:  NORVASC     atorvastatin 40 MG tablet  Commonly known as:  LIPITOR     carvedilol 12.5 MG tablet  Commonly known as:  COREG     clindamycin 300 MG capsule  Commonly known as:  CLEOCIN     ipratropium 17 mcg/actuation inhaler  Commonly known as:  ATROVENT HFA     lamiVUDine 100 MG Tab  Commonly known as:  EPIVIR     nicotine 14 mg/24 hr  Commonly known as:  NICODERM CQ     nitroGLYCERIN 0.4 MG SL tablet  Commonly known as:  NITROSTAT     oxyCODONE 5 MG immediate release tablet  Commonly known as:  ROXICODONE     predniSONE 20 MG tablet  Commonly known as:  DELTASONE     SORAfenib 200 mg tablet  Commonly known as:  FRANK Estrada  MD   Internal Medicine PGY-2  [phone]

## 2018-10-12 NOTE — SIGNIFICANT EVENT
Critical Care Medicine Death Note    Care previously withdrawn in accordance with patient and family wishes and Comfort Measures initiated.  Called to bedside by patient's nurse.  Nursing supervisor notified.  Family present at bedside.  has been called.  Patient is not responding to verbal or tactile stimuli.  His pupils are fixed and dilated. No heart or breath sounds on auscultation. No respirations. No palpable pulses.  Asystole noted on telemetry.    Time of death: 1907.     Cause of Death: Septic Shock.    Frandy Novoa MD  [employer] Pulmonary/Critical Care

## 2018-10-12 NOTE — PROGRESS NOTES
[facility]  Hematology  Bone Marrow Transplant  Progress Note    Patient Name: Kevin Crews  Admission Date: 10/9/2018  Hospital Length of Stay: 2 days  Code Status: DNR    Subjective:     Interval History: Intubated, unable to perform ROS    Objective:     Vital Signs (Most Recent):  Temp: 98.9 °F (37.2 °C) (10/11/18 1500)  Pulse: 83 (10/11/18 1800)  Resp: 20 (10/11/18 1800)  BP: 122/63 (10/09/18 1516)  SpO2: (!) 84 % (10/11/18 1800) Vital Signs (24h Range):  Temp:  [97.7 °F (36.5 °C)-100 °F (37.8 °C)] 98.9 °F (37.2 °C)  Pulse:  [66-83] 83  Resp:  [0-22] 20  SpO2:  [84 %-96 %] 84 %  Arterial Line BP: ()/(43-69) 81/43     Weight: 90.2 kg (198 lb 13.7 oz)  Body mass index is 27.73 kg/m².  Body surface area is 2.13 meters squared.    ECOG SCORE         [unfilled]    Intake/Output - Last 3 Shifts       10/09 0700 - 10/10 0659 10/10 0700 - 10/11 0659 10/11 0700 - 10/12 0659    P.O.   0    I.V. (mL/kg) 2556.6 (29) 6262.5 (69.4) 3116.4 (34.5)    Blood 2011.8      NG/GT 60 50     IV Piggyback 3463 650     Total Intake(mL/kg) 8091.3 (91.7) 6962.5 (77.2) 3116.4 (34.5)    Urine (mL/kg/hr) 1520 715 (0.3) 155 (0.1)    Total Output 1520 715 155    Net +6571.3 +6247.5 +2961.4                 Physical Exam   Constitutional: He appears well-developed. No distress.   Intubated, sedated    HENT:   Head: Normocephalic and atraumatic.   Mouth/Throat: No oropharyngeal exudate.   intubated   Eyes: Pupils are equal, round, and reactive to light. No scleral icterus.   Neck: Normal range of motion.   Cardiovascular: Normal rate, regular rhythm and normal heart sounds.   No murmur heard.  Pulmonary/Chest: Effort normal and breath sounds normal. No stridor. No respiratory distress.   Abdominal: Soft. Bowel sounds are normal. He exhibits no distension. There is no tenderness.   Musculoskeletal: Normal range of motion. He exhibits no edema.   Neurological:   Intubated, sedated    Skin: Skin is warm and dry. No  erythema.   Vitals reviewed.      Significant Labs:   CBC:   Recent Labs   Lab  10/10/18   1253  10/10/18   1740  10/11/18   0306   WBC  34.77*  21.03*  31.69*   HGB  6.8*  6.0*  7.9*   HCT  21.2*  18.3*  22.6*   PLT  34*  26*  23*    and CMP:   Recent Labs   Lab  10/10/18   1253  10/10/18   1740  10/11/18   0306  10/11/18   0823  10/11/18   1104   NA  121*  122*  122*  122*  121*  122*   K  6.6*  5.3*  5.3*  6.2*  5.6*  5.7*   CL  93*  91*  91*  91*  87*  88*   CO2  15*  15*  15*  15*  20*  21*   GLU  223*  202*  202*  124*  235*  185*   BUN  65*  70*  70*  80*  84*  86*   CREATININE  3.4*  3.8*  3.8*  4.4*  4.6*  4.8*   CALCIUM  6.3*  6.0*  6.0*  6.0*  5.7*  5.7*   PROT  5.4*  5.0*  5.5*   --    --    ALBUMIN  2.0*  1.9*  2.0*   --    --    BILITOT  3.0*  2.8*  3.0*   --    --    ALKPHOS  94  91  110   --    --    AST  1,071*  1,988*  3,835*   --    --    ALT  674*  1,133*  2,148*   --    --    ANIONGAP  13  16  16  16  14  13   EGFRNONAA  18.5*  16.2*  16.2*  13.6*  12.9*  12.2*       Diagnostic Results:  None    Assessment/Plan:     Acute myeloid leukemia not having achieved remission    Untreated AML, FLT3+ presenting with septic shock. Initial assessment was prior to the patient coding.  Following the code, he was intubated and on 3 pressors.  Septic shock secondary to pseudomonas bacteremia.  Prognosis poor.  Met with family 10/10/18 to get a sense of their understanding and to explain the patient's current situation.  Overall told them that he was not a candidate for chemotherapy as the risks outweighed the benefits.  See clinical review note 10/10/18.    -Met with family today with Dr. Lew.  Spent at least 30 minutes with family answering questions and providing emotional support.  At the end of the meeting, family made a decision to make him comfort care.  -Spoke to ICU team regarding discussion.  -Available at any time to visit again with family for any other questions.            VTE Risk  Mitigation (From admission, onward)        Ordered     Reason for No Pharmacological VTE Prophylaxis  Once      10/09/18 0923     IP VTE HIGH RISK PATIENT  Once      10/09/18 0923          Disposition: comfort care    Blaine Aervalo MD  Bone Marrow Transplant  [facility]

## 2018-10-12 NOTE — PLAN OF CARE
10/12/18 0953   Final Note   Assessment Type Final Discharge Note   Discharge Disposition    Hospital Follow Up  Appt(s) scheduled? No   Discharge plans and expectations educations in teach back method with documentation complete? No   Right Care Referral Info   Post Acute Recommendation Other  ( - withdrawal of care)   Siobhan Salinas RN, BSN, [facility]  Case Management  [facility]  Ext. [phone]

## 2018-10-12 NOTE — ASSESSMENT & PLAN NOTE
- Patient progressed from oliguric to anuric renal failure  - high concern for iATN given degree of sepsis  - discussed need for renal replacement therapy, do not feel it would have benefited patient  - 10/11 actively shifting K and correcting underlying acidosis with bicarb gtt, insulin + D50, calcium gluconate for cardiac membrane stabilization  - continue q 6 labs at this time

## 2018-10-12 NOTE — ASSESSMENT & PLAN NOTE
- CAD s/p PCI x 2 in 2008  - no anginal symptoms previously, troponin initially negative, suspected hypotension unrelated   - however, 2D echo with new inferolateral wall hypokinesis as well as global hypokinesis, possibly related to sepsis

## 2018-10-12 NOTE — SUBJECTIVE & OBJECTIVE
Subjective:     Interval History: Intubated, unable to perform ROS    Objective:     Vital Signs (Most Recent):  Temp: 98.9 °F (37.2 °C) (10/11/18 1500)  Pulse: 83 (10/11/18 1800)  Resp: 20 (10/11/18 1800)  BP: 122/63 (10/09/18 1516)  SpO2: (!) 84 % (10/11/18 1800) Vital Signs (24h Range):  Temp:  [97.7 °F (36.5 °C)-100 °F (37.8 °C)] 98.9 °F (37.2 °C)  Pulse:  [66-83] 83  Resp:  [0-22] 20  SpO2:  [84 %-96 %] 84 %  Arterial Line BP: ()/(43-69) 81/43     Weight: 90.2 kg (198 lb 13.7 oz)  Body mass index is 27.73 kg/m².  Body surface area is 2.13 meters squared.    ECOG SCORE         [unfilled]    Intake/Output - Last 3 Shifts       10/09 0700 - 10/10 0659 10/10 0700 - 10/11 0659 10/11 0700 - 10/12 0659    P.O.   0    I.V. (mL/kg) 2556.6 (29) 6262.5 (69.4) 3116.4 (34.5)    Blood 2011.8      NG/GT 60 50     IV Piggyback 3463 650     Total Intake(mL/kg) 8091.3 (91.7) 6962.5 (77.2) 3116.4 (34.5)    Urine (mL/kg/hr) 1520 715 (0.3) 155 (0.1)    Total Output 1520 715 155    Net +6571.3 +6247.5 +2961.4                 Physical Exam   Constitutional: He appears well-developed. No distress.   Intubated, sedated    HENT:   Head: Normocephalic and atraumatic.   Mouth/Throat: No oropharyngeal exudate.   intubated   Eyes: Pupils are equal, round, and reactive to light. No scleral icterus.   Neck: Normal range of motion.   Cardiovascular: Normal rate, regular rhythm and normal heart sounds.   No murmur heard.  Pulmonary/Chest: Effort normal and breath sounds normal. No stridor. No respiratory distress.   Abdominal: Soft. Bowel sounds are normal. He exhibits no distension. There is no tenderness.   Musculoskeletal: Normal range of motion. He exhibits no edema.   Neurological:   Intubated, sedated    Skin: Skin is warm and dry. No erythema.   Vitals reviewed.      Significant Labs:   CBC:   Recent Labs   Lab  10/10/18   1253  10/10/18   1740  10/11/18   0306   WBC  34.77*  21.03*  31.69*   HGB  6.8*  6.0*  7.9*   HCT  21.2*   18.3*  22.6*   PLT  34*  26*  23*    and CMP:   Recent Labs   Lab  10/10/18   1253  10/10/18   1740  10/11/18   0306  10/11/18   0823  10/11/18   1104   NA  121*  122*  122*  122*  121*  122*   K  6.6*  5.3*  5.3*  6.2*  5.6*  5.7*   CL  93*  91*  91*  91*  87*  88*   CO2  15*  15*  15*  15*  20*  21*   GLU  223*  202*  202*  124*  235*  185*   BUN  65*  70*  70*  80*  84*  86*   CREATININE  3.4*  3.8*  3.8*  4.4*  4.6*  4.8*   CALCIUM  6.3*  6.0*  6.0*  6.0*  5.7*  5.7*   PROT  5.4*  5.0*  5.5*   --    --    ALBUMIN  2.0*  1.9*  2.0*   --    --    BILITOT  3.0*  2.8*  3.0*   --    --    ALKPHOS  94  91  110   --    --    AST  1,071*  1,988*  3,835*   --    --    ALT  674*  1,133*  2,148*   --    --    ANIONGAP  13  16  16  16  14  13   EGFRNONAA  18.5*  16.2*  16.2*  13.6*  12.9*  12.2*       Diagnostic Results:  None

## 2018-10-12 NOTE — ASSESSMENT & PLAN NOTE
- on home norvasc 10 mg PO daily and coreg 12.5 mg PO daily  - antihypertensives held in setting of septic shock

## 2018-10-12 NOTE — ASSESSMENT & PLAN NOTE
- recent diagnosis of non M3 AML, FLT3 positive with normal cytogenetics  - BMT consulted, noted progressive disease, unlikely treatable

## 2018-10-13 LAB
BACTERIA SPEC AEROBE CULT: NORMAL
BACTERIA SPEC AEROBE CULT: NORMAL
GRAM STN SPEC: NORMAL

## 2018-10-15 LAB
BACTERIA BLD CULT: NORMAL
BACTERIA BLD CULT: NORMAL

## 2018-10-16 LAB — SYMPTOMS P TRANSF RX PATIENT-IMP: NORMAL

## 2020-12-16 NOTE — SUBJECTIVE & OBJECTIVE
Past Medical History:   Diagnosis Date    COPD (chronic obstructive pulmonary disease)     Heart attack 2010    Hypertension     Leukemia     AML    Prostate cancer     Stroke        Past Surgical History:   Procedure Laterality Date    CORONARY ANGIOPLASTY WITH STENT PLACEMENT      EYE SURGERY      PROSTATE SURGERY         Review of patient's allergies indicates:   Allergen Reactions    Lisinopril Swelling     Angioedema    Effient [prasugrel] Rash       Family History     None        Tobacco Use    Smoking status: Current Every Day Smoker     Packs/day: 1.00     Years: 40.00     Pack years: 40.00     Types: Cigarettes    Smokeless tobacco: Never Used   Substance and Sexual Activity    Alcohol use: Yes     Comment: RARELY    Drug use: Yes     Types: Cocaine     Comment: HAVEN'T USED IN 2 WEEKS    Sexual activity: Not on file      Review of Systems   Unable to perform ROS: Acuity of condition     Objective:     Vital Signs (Most Recent):  Temp: 97.6 °F (36.4 °C) (10/09/18 1330)  Pulse: 101 (10/09/18 1330)  Resp: (!) 24 (10/09/18 1330)  BP: (!) 91/56 (10/09/18 1330)  SpO2: 95 % (10/09/18 1330) Vital Signs (24h Range):  Temp:  [97.4 °F (36.3 °C)-98.6 °F (37 °C)] 97.6 °F (36.4 °C)  Pulse:  [] 101  Resp:  [16-66] 24  SpO2:  [83 %-100 %] 95 %  BP: ()/(43-79) 91/56  Arterial Line BP: ()/(40-65) 82/56   Weight: 82.1 kg (181 lb)  Body mass index is 25.24 kg/m².      Intake/Output Summary (Last 24 hours) at 10/9/2018 1341  Last data filed at 10/9/2018 1300  Gross per 24 hour   Intake 4751 ml   Output 365 ml   Net 4386 ml       Physical Exam   Constitutional: He appears well-developed. No distress.   HENT:   Head: Normocephalic and atraumatic.   Mouth/Throat: No oropharyngeal exudate.   Noted missing teeth, no significant abscesses noted    Eyes: Pupils are equal, round, and reactive to light. No scleral icterus.   Neck: Normal range of motion.   Cardiovascular: Normal rate, regular rhythm  and normal heart sounds.   No murmur heard.  Pulmonary/Chest: Effort normal and breath sounds normal. No stridor. No respiratory distress.   Abdominal: Soft. Bowel sounds are normal. He exhibits no distension. There is no tenderness.   Musculoskeletal: Normal range of motion. He exhibits no edema.   Neurological:   Alert and oriented to person   Skin: Skin is warm and dry. No erythema.   Vitals reviewed.      Vents:     Lines/Drains/Airways     Central Venous Catheter Line                 Percutaneous Central Line Insertion/Assessment - triple lumen  10/09/18 0922 right internal jugular less than 1 day          Drain                 Urethral Catheter 10/09/18 0912 Double-lumen;Latex 16 Fr. less than 1 day          Arterial Line                 Arterial Line 10/09/18 1005 Left Radial less than 1 day          Peripheral Intravenous Line                 Peripheral IV - Single Lumen 10/09/18 0714 Right Wrist less than 1 day         Peripheral IV - Single Lumen 10/09/18 0720 Left Antecubital less than 1 day         Peripheral IV - Single Lumen 10/09/18 0833 Right Antecubital less than 1 day         Peripheral IV - Single Lumen 10/09/18 0912 Left Hand less than 1 day              Significant Labs:    CBC/Anemia Profile:  Recent Labs   Lab  10/08/18   1346  10/09/18   0712  10/09/18   0814  10/09/18   0942  10/09/18   1211   WBC  6.48  22.10*   --    --   16.06*   HGB  6.5*  5.8*   --    --   6.5*   HCT  19.5*  17.6*  19*   --   19.6*   PLT  9*  37*   --    --    --    MCV  98  98   --    --   95   RDW  18.6*  18.6*   --    --   18.3*   RETIC   --    --    --   1.1   --         Chemistries:  Recent Labs   Lab  10/08/18   1346  10/09/18   0712  10/09/18   0942  10/09/18   1211   NA  131*  130*   --   131*   K  3.9  3.8   --   3.7   CL  95  93*   --   99   CO2  22*  19*   --   17*   BUN  38*  45*   --   45*   CREATININE  1.2  2.6*   --   2.3*   CALCIUM  8.1*  8.0*   --   6.9*   ALBUMIN  2.9*  2.6*   --   2.1*   PROT  7.2   6.4   --   5.4*   BILITOT  0.7  1.0   --   1.2*   ALKPHOS  75  95   --   82   ALT  65*  70*   --   67*   AST  26  55*   --   51*   MG   --   1.8   --   1.6   PHOS   --   4.0  4.5   --        Significant Imaging: I have reviewed all pertinent imaging results/findings within the past 24 hours.     CT head without contrast 10/9/2018    Significant motion limited examination as detailed above without definite acute intracranial hemorrhage or hydrocephalus.  Clinical correlation and further evaluation with repeat attempts for imaging when patient better able to tolerate scanning as warranted.    Probable remote fracture deformities lamina papyracea with moderate patchy opacities in the visualized paranasal sinuses    CT neck/chest/abdomen/pelvis 10/9/2018    Multifocal subsegmental opacities throughout the lungs as well as focal consolidation in the left lower lobe concerning for infectious process.  Recommend further evaluation and follow-up to document resolution.    Significant periodontal disease including marked erosive change of the oral maxilla with minimal residual bone  the oral cavity from the maxillary sinuses.  No focal abscess or inflammation noting limited evaluation secondary to lack of contrast.  Significant sinus disease as above    Bilateral cervical fullness concerning for lymphadenopathy.    Soft tissue haziness around the distal aorta/proximal iliac arteries which may relate to motion, however inflammatory change cannot be completely excluded.   BH note
